# Patient Record
Sex: MALE | Race: WHITE | HISPANIC OR LATINO | URBAN - METROPOLITAN AREA
[De-identification: names, ages, dates, MRNs, and addresses within clinical notes are randomized per-mention and may not be internally consistent; named-entity substitution may affect disease eponyms.]

---

## 2024-07-09 ENCOUNTER — INPATIENT (INPATIENT)
Facility: HOSPITAL | Age: 89
LOS: 0 days | Discharge: NURSING, REHAB OR LONG-TERM CARE | DRG: 185 | End: 2024-07-10
Attending: INTERNAL MEDICINE | Admitting: STUDENT IN AN ORGANIZED HEALTH CARE EDUCATION/TRAINING PROGRAM
Payer: MEDICARE

## 2024-07-09 VITALS
HEART RATE: 61 BPM | TEMPERATURE: 98 F | OXYGEN SATURATION: 98 % | RESPIRATION RATE: 18 BRPM | DIASTOLIC BLOOD PRESSURE: 84 MMHG | SYSTOLIC BLOOD PRESSURE: 161 MMHG

## 2024-07-09 DIAGNOSIS — S22.39XA FRACTURE OF ONE RIB, UNSPECIFIED SIDE, INITIAL ENCOUNTER FOR CLOSED FRACTURE: ICD-10-CM

## 2024-07-09 LAB
ALBUMIN SERPL ELPH-MCNC: 4.2 G/DL — SIGNIFICANT CHANGE UP (ref 3.5–5.2)
ALP SERPL-CCNC: 110 U/L — SIGNIFICANT CHANGE UP (ref 30–115)
ALT FLD-CCNC: 16 U/L — SIGNIFICANT CHANGE UP (ref 0–41)
ANION GAP SERPL CALC-SCNC: 8 MMOL/L — SIGNIFICANT CHANGE UP (ref 7–14)
APPEARANCE UR: CLEAR — SIGNIFICANT CHANGE UP
AST SERPL-CCNC: 24 U/L — SIGNIFICANT CHANGE UP (ref 0–41)
BACTERIA # UR AUTO: NEGATIVE /HPF — SIGNIFICANT CHANGE UP
BASOPHILS # BLD AUTO: 0.03 K/UL — SIGNIFICANT CHANGE UP (ref 0–0.2)
BASOPHILS NFR BLD AUTO: 0.4 % — SIGNIFICANT CHANGE UP (ref 0–1)
BILIRUB SERPL-MCNC: 0.6 MG/DL — SIGNIFICANT CHANGE UP (ref 0.2–1.2)
BILIRUB UR-MCNC: NEGATIVE — SIGNIFICANT CHANGE UP
BUN SERPL-MCNC: 15 MG/DL — SIGNIFICANT CHANGE UP (ref 10–20)
CALCIUM SERPL-MCNC: 9.2 MG/DL — SIGNIFICANT CHANGE UP (ref 8.4–10.5)
CAST: 1 /LPF — SIGNIFICANT CHANGE UP (ref 0–4)
CHLORIDE SERPL-SCNC: 104 MMOL/L — SIGNIFICANT CHANGE UP (ref 98–110)
CO2 SERPL-SCNC: 27 MMOL/L — SIGNIFICANT CHANGE UP (ref 17–32)
COLOR SPEC: YELLOW — SIGNIFICANT CHANGE UP
CREAT SERPL-MCNC: 1.1 MG/DL — SIGNIFICANT CHANGE UP (ref 0.7–1.5)
DIFF PNL FLD: ABNORMAL
EGFR: 64 ML/MIN/1.73M2 — SIGNIFICANT CHANGE UP
EOSINOPHIL # BLD AUTO: 0.1 K/UL — SIGNIFICANT CHANGE UP (ref 0–0.7)
EOSINOPHIL NFR BLD AUTO: 1.3 % — SIGNIFICANT CHANGE UP (ref 0–8)
GLUCOSE SERPL-MCNC: 84 MG/DL — SIGNIFICANT CHANGE UP (ref 70–99)
GLUCOSE UR QL: NEGATIVE MG/DL — SIGNIFICANT CHANGE UP
HCT VFR BLD CALC: 40.7 % — LOW (ref 42–52)
HGB BLD-MCNC: 13.5 G/DL — LOW (ref 14–18)
IMM GRANULOCYTES NFR BLD AUTO: 0.4 % — HIGH (ref 0.1–0.3)
KETONES UR-MCNC: NEGATIVE MG/DL — SIGNIFICANT CHANGE UP
LACTATE SERPL-SCNC: 0.8 MMOL/L — SIGNIFICANT CHANGE UP (ref 0.7–2)
LEUKOCYTE ESTERASE UR-ACNC: NEGATIVE — SIGNIFICANT CHANGE UP
LIDOCAIN IGE QN: 27 U/L — SIGNIFICANT CHANGE UP (ref 7–60)
LYMPHOCYTES # BLD AUTO: 1.7 K/UL — SIGNIFICANT CHANGE UP (ref 1.2–3.4)
LYMPHOCYTES # BLD AUTO: 21.6 % — SIGNIFICANT CHANGE UP (ref 20.5–51.1)
MCHC RBC-ENTMCNC: 31.2 PG — HIGH (ref 27–31)
MCHC RBC-ENTMCNC: 33.2 G/DL — SIGNIFICANT CHANGE UP (ref 32–37)
MCV RBC AUTO: 94 FL — SIGNIFICANT CHANGE UP (ref 80–94)
MONOCYTES # BLD AUTO: 0.64 K/UL — HIGH (ref 0.1–0.6)
MONOCYTES NFR BLD AUTO: 8.1 % — SIGNIFICANT CHANGE UP (ref 1.7–9.3)
NEUTROPHILS # BLD AUTO: 5.37 K/UL — SIGNIFICANT CHANGE UP (ref 1.4–6.5)
NEUTROPHILS NFR BLD AUTO: 68.2 % — SIGNIFICANT CHANGE UP (ref 42.2–75.2)
NITRITE UR-MCNC: NEGATIVE — SIGNIFICANT CHANGE UP
NRBC # BLD: 0 /100 WBCS — SIGNIFICANT CHANGE UP (ref 0–0)
PH UR: 6 — SIGNIFICANT CHANGE UP (ref 5–8)
PLATELET # BLD AUTO: 209 K/UL — SIGNIFICANT CHANGE UP (ref 130–400)
PMV BLD: 9.3 FL — SIGNIFICANT CHANGE UP (ref 7.4–10.4)
POTASSIUM SERPL-MCNC: 4.6 MMOL/L — SIGNIFICANT CHANGE UP (ref 3.5–5)
POTASSIUM SERPL-SCNC: 4.6 MMOL/L — SIGNIFICANT CHANGE UP (ref 3.5–5)
PROT SERPL-MCNC: 7.1 G/DL — SIGNIFICANT CHANGE UP (ref 6–8)
PROT UR-MCNC: SIGNIFICANT CHANGE UP MG/DL
RBC # BLD: 4.33 M/UL — LOW (ref 4.7–6.1)
RBC # FLD: 13.1 % — SIGNIFICANT CHANGE UP (ref 11.5–14.5)
RBC CASTS # UR COMP ASSIST: 12 /HPF — HIGH (ref 0–4)
SODIUM SERPL-SCNC: 139 MMOL/L — SIGNIFICANT CHANGE UP (ref 135–146)
SP GR SPEC: >1.03 — HIGH (ref 1–1.03)
SQUAMOUS # UR AUTO: 1 /HPF — SIGNIFICANT CHANGE UP (ref 0–5)
UROBILINOGEN FLD QL: 1 MG/DL — SIGNIFICANT CHANGE UP (ref 0.2–1)
WBC # BLD: 7.87 K/UL — SIGNIFICANT CHANGE UP (ref 4.8–10.8)
WBC # FLD AUTO: 7.87 K/UL — SIGNIFICANT CHANGE UP (ref 4.8–10.8)
WBC UR QL: 1 /HPF — SIGNIFICANT CHANGE UP (ref 0–5)

## 2024-07-09 PROCEDURE — 71045 X-RAY EXAM CHEST 1 VIEW: CPT | Mod: 26

## 2024-07-09 PROCEDURE — 80053 COMPREHEN METABOLIC PANEL: CPT

## 2024-07-09 PROCEDURE — 36415 COLL VENOUS BLD VENIPUNCTURE: CPT

## 2024-07-09 PROCEDURE — 99285 EMERGENCY DEPT VISIT HI MDM: CPT

## 2024-07-09 PROCEDURE — 83735 ASSAY OF MAGNESIUM: CPT

## 2024-07-09 PROCEDURE — 85025 COMPLETE CBC W/AUTO DIFF WBC: CPT

## 2024-07-09 PROCEDURE — 84100 ASSAY OF PHOSPHORUS: CPT

## 2024-07-09 PROCEDURE — 97162 PT EVAL MOD COMPLEX 30 MIN: CPT | Mod: GP

## 2024-07-09 PROCEDURE — 70450 CT HEAD/BRAIN W/O DYE: CPT | Mod: 26,MC

## 2024-07-09 PROCEDURE — 74177 CT ABD & PELVIS W/CONTRAST: CPT | Mod: 26,MC

## 2024-07-09 RX ORDER — MORPHINE SULFATE 100 MG/1
2 TABLET, EXTENDED RELEASE ORAL EVERY 8 HOURS
Refills: 0 | Status: DISCONTINUED | OUTPATIENT
Start: 2024-07-09 | End: 2024-07-09

## 2024-07-09 RX ORDER — ONDANSETRON HYDROCHLORIDE 2 MG/ML
4 INJECTION INTRAMUSCULAR; INTRAVENOUS ONCE
Refills: 0 | Status: COMPLETED | OUTPATIENT
Start: 2024-07-09 | End: 2024-07-09

## 2024-07-09 RX ORDER — MEMANTINE HYDROCHLORIDE 7 MG/1
10 CAPSULE, EXTENDED RELEASE ORAL
Refills: 0 | Status: DISCONTINUED | OUTPATIENT
Start: 2024-07-09 | End: 2024-07-10

## 2024-07-09 RX ORDER — LISINOPRIL 5 MG/1
10 TABLET ORAL DAILY
Refills: 0 | Status: DISCONTINUED | OUTPATIENT
Start: 2024-07-09 | End: 2024-07-10

## 2024-07-09 RX ORDER — DORZOLAMIDE HCL 2 %
1 DROPS OPHTHALMIC (EYE) THREE TIMES A DAY
Refills: 0 | Status: DISCONTINUED | OUTPATIENT
Start: 2024-07-09 | End: 2024-07-10

## 2024-07-09 RX ORDER — ENOXAPARIN SODIUM 100 MG/ML
40 INJECTION SUBCUTANEOUS EVERY 24 HOURS
Refills: 0 | Status: DISCONTINUED | OUTPATIENT
Start: 2024-07-09 | End: 2024-07-10

## 2024-07-09 RX ORDER — KETOROLAC TROMETHAMINE 30 MG/ML
15 INJECTION, SOLUTION INTRAMUSCULAR ONCE
Refills: 0 | Status: DISCONTINUED | OUTPATIENT
Start: 2024-07-09 | End: 2024-07-09

## 2024-07-09 RX ORDER — LATANOPROST PF 0.05 MG/ML
1 SOLUTION/ DROPS OPHTHALMIC AT BEDTIME
Refills: 0 | Status: DISCONTINUED | OUTPATIENT
Start: 2024-07-09 | End: 2024-07-10

## 2024-07-09 RX ORDER — ATORVASTATIN CALCIUM 20 MG/1
40 TABLET, FILM COATED ORAL AT BEDTIME
Refills: 0 | Status: DISCONTINUED | OUTPATIENT
Start: 2024-07-09 | End: 2024-07-10

## 2024-07-09 RX ORDER — MORPHINE SULFATE 100 MG/1
2 TABLET, EXTENDED RELEASE ORAL EVERY 8 HOURS
Refills: 0 | Status: DISCONTINUED | OUTPATIENT
Start: 2024-07-09 | End: 2024-07-10

## 2024-07-09 RX ORDER — LISINOPRIL 5 MG/1
1 TABLET ORAL
Refills: 0 | DISCHARGE

## 2024-07-09 RX ORDER — ATORVASTATIN CALCIUM 20 MG/1
1 TABLET, FILM COATED ORAL
Refills: 0 | DISCHARGE

## 2024-07-09 RX ORDER — MORPHINE SULFATE 100 MG/1
2 TABLET, EXTENDED RELEASE ORAL ONCE
Refills: 0 | Status: DISCONTINUED | OUTPATIENT
Start: 2024-07-09 | End: 2024-07-09

## 2024-07-09 RX ORDER — ESCITALOPRAM OXALATE 20 MG/1
10 TABLET, FILM COATED ORAL DAILY
Refills: 0 | Status: DISCONTINUED | OUTPATIENT
Start: 2024-07-09 | End: 2024-07-10

## 2024-07-09 RX ORDER — ESCITALOPRAM OXALATE 20 MG/1
1 TABLET, FILM COATED ORAL
Refills: 0 | DISCHARGE

## 2024-07-09 RX ORDER — DONEPEZIL HYDROCHLORIDE 10 MG/1
23 TABLET, FILM COATED ORAL AT BEDTIME
Refills: 0 | Status: DISCONTINUED | OUTPATIENT
Start: 2024-07-09 | End: 2024-07-09

## 2024-07-09 RX ORDER — BRINZOLAMIDE/BRIMONIDINE TARTRATE 10; 2 MG/ML; MG/ML
1 SUSPENSION/ DROPS OPHTHALMIC
Refills: 0 | DISCHARGE

## 2024-07-09 RX ORDER — DEXTROSE MONOHYDRATE AND SODIUM CHLORIDE 5; .3 G/100ML; G/100ML
1000 INJECTION, SOLUTION INTRAVENOUS ONCE
Refills: 0 | Status: COMPLETED | OUTPATIENT
Start: 2024-07-09 | End: 2024-07-09

## 2024-07-09 RX ORDER — MEMANTINE HYDROCHLORIDE 7 MG/1
1 CAPSULE, EXTENDED RELEASE ORAL
Refills: 0 | DISCHARGE

## 2024-07-09 RX ORDER — OXYCODONE AND ACETAMINOPHEN 5; 325 MG/1; MG/1
1 TABLET ORAL EVERY 6 HOURS
Refills: 0 | Status: DISCONTINUED | OUTPATIENT
Start: 2024-07-09 | End: 2024-07-10

## 2024-07-09 RX ORDER — LATANOPROST PF 0.05 MG/ML
1 SOLUTION/ DROPS OPHTHALMIC
Refills: 0 | DISCHARGE

## 2024-07-09 RX ORDER — DONEPEZIL HYDROCHLORIDE 10 MG/1
1 TABLET, FILM COATED ORAL
Refills: 0 | DISCHARGE

## 2024-07-09 RX ORDER — DONEPEZIL HYDROCHLORIDE 10 MG/1
10 TABLET, FILM COATED ORAL AT BEDTIME
Refills: 0 | Status: DISCONTINUED | OUTPATIENT
Start: 2024-07-09 | End: 2024-07-10

## 2024-07-09 RX ADMIN — KETOROLAC TROMETHAMINE 15 MILLIGRAM(S): 30 INJECTION, SOLUTION INTRAMUSCULAR at 15:24

## 2024-07-09 RX ADMIN — ONDANSETRON HYDROCHLORIDE 4 MILLIGRAM(S): 2 INJECTION INTRAMUSCULAR; INTRAVENOUS at 15:24

## 2024-07-09 RX ADMIN — MORPHINE SULFATE 2 MILLIGRAM(S): 100 TABLET, EXTENDED RELEASE ORAL at 18:11

## 2024-07-09 RX ADMIN — DEXTROSE MONOHYDRATE AND SODIUM CHLORIDE 1000 MILLILITER(S): 5; .3 INJECTION, SOLUTION INTRAVENOUS at 15:24

## 2024-07-09 NOTE — ED PROVIDER NOTE - CLINICAL SUMMARY MEDICAL DECISION MAKING FREE TEXT BOX
89 year old male with PMH of Alzheimer's Dx, HTN, Dementia, HLD, and CHF presenting for right sided back pain since 1 day. Per [facility], patient was complaining of severe right lower back pain x1 day. No reports of recent falls or infections. Patient is unable to provide additional medical history. Patient is A&OX1 at baseline.  VSS.  CTAP: There are acute fractures of the right posterior 11th and 12th ribs No evidence of renal or ureteral stone. No evidence of obstructive uropathy. Bilateral pleural calcifications are noted. There are thickened interlobular septa and mild subpleural fibrocystic changes at both lung bases compatible with interstitial lung disease.  Toradol, Morphine, ADMIT.

## 2024-07-09 NOTE — ED PROVIDER NOTE - OBJECTIVE STATEMENT
Patient is a 89 year old male with PMH of Alzheimer's Dx, HTN, Dementia, HLD, and CHF presenting for flank pain. Per [facility], patient was complaining of severe right lower flank pain x1 day. No reports of recent falls or infections. Patient is unable to provide additional medical history.

## 2024-07-09 NOTE — H&P ADULT - ATTENDING COMMENTS
Pt seen and examined in ED4 today. History as above and case discussed with son and daughter in law at bedside.  Pt presented from assisted living with right sided back pain x 1 day. He ambulates without assistance. NO h/o fall, trauma.  Pt currently has no complaints. NO fever. Workup showed 2 right rib fractures and he was admitted. He ambulated 75 ft with RW with physical therapy today. Remains on RA and in no distress. Family wants him discharged back to assisted living today if stable.     T(F): 97.5 (07-10-24 @ 07:50), Max: 98.2 (07-09-24 @ 14:00)  HR: 58 (07-10-24 @ 07:50) (57 - 61)  BP: 138/64 (07-10-24 @ 07:50) (138/64 - 161/84)  RR: 18 (07-10-24 @ 07:50) (18 - 18)  SpO2: 98% (07-10-24 @ 07:50) (96% - 98%)    PHYSICAL EXAM:  GENERAL: NAD  HEAD:  Normocephalic  EYES:  conjunctiva and sclera clear  ENMT: Moist mucous membranes  NERVOUS SYSTEM:  Alert, awake, fair concentration, follows commands, ate breakfast  CHEST/LUNG: CTA b/l; No rales, rhonchi, wheezing  HEART: Regular rate and rhythm  ABDOMEN: Soft, Nontender, Nondistended  EXTREMITIES:   No edema LE  SKIN: warm, dry  no ecchymoses of back noted    Consultant(s) Notes Reviewed:  [x ] YES  [ ] NO  Care Discussed with Consultants/Other Providers [ x] YES  [ ] NO    LABS:                        12.7   6.57  )-----------( 192      ( 10 Jul 2024 06:31 )             38.2     07-10    142  |  104  |  17  ----------------------------<  77  4.8   |  26  |  1.1    Ca    9.4      10 Jul 2024 06:31  Phos  3.3     07-10  Mg     2.0     07-10    TPro  6.0  /  Alb  4.0  /  TBili  0.8  /  DBili  x   /  AST  23  /  ALT  14  /  AlkPhos  115  07-10      RADIOLOGY & ADDITIONAL TESTS:  Imaging or report Personally Reviewed:  [x ] YES  [ ] NO    < from: Xray Chest 1 View-PORTABLE IMMEDIATE (Xray Chest 1 View-PORTABLE IMMEDIATE .) (07.09.24 @ 18:22) >  Impression: Rib fractures better appreciated on CT.  No radiographic evidence of acute cardiopulmonary disease. Calcified pleural plaques.    < from: CT Abdomen and Pelvis w/ IV Cont (07.09.24 @ 15:42) >  IMPRESSION:There are acute fractures of the right posterior 11th and 12th ribs   (3/111; 3/136).  No evidence of renal or ureteral stone. No evidence of obstructive uropathy.  Bilateral pleural calcifications are noted. There are thickened interlobular septa and mild subpleural fibrocystic changes at both lung   bases compatible with interstitial lung disease.    < from: CT Head No Cont (07.09.24 @ 15:39) >  IMPRESSION: No evidence of acute intracranial pathology.    Case discussed with residents and RN on rounds today  Care discussed with pt's family    89 y/o man with PMH of Alzheimer's Dx, HTN, Dementia, hyperlipidemia and CHF (unknown EF) presented for right sided back pain x 1 day and found with acute 11th and 12th Right posterior Rib fractures.    Acute right rib fractures - pt currently comfortable in bed and ambulated 75 ft with RW with physical therapy  pain control with tylenol, lidoderm  fall precautions  home PT  stable for discharge back to assisted living today    Continue current management for HTN, hyperlipidemia and dementia    full code    med rec and discharge papers reviewed by me

## 2024-07-09 NOTE — H&P ADULT - NSHPPHYSICALEXAM_GEN_ALL_CORE
PHYSICAL EXAM:  GENERAL: NAD, well-groomed, well-developed  HEAD:  Atraumatic, Normocephalic  EYES: EOMI, PERRLA, conjunctiva and sclera clear  NECK: Supple, No JVD, Normal thyroid  HEART: Regular rate and rhythm; No murmurs, rubs, or gallops  RESPIRATORY: CTA B/L, No W/R/R,   ABDOMEN: Soft, Nontender, Nondistended; Bowel sounds present  NEUROLOGY: A&Ox1, nonfocal, moving all extremities  EXTREMITIES:  2+ Peripheral Pulses, No clubbing, cyanosis, or edema  SKIN: warm, dry, normal color, no rash or abnormal lesions, no bruises on skin

## 2024-07-09 NOTE — ED PROVIDER NOTE - PHYSICAL EXAMINATION
VITAL SIGNS: I have reviewed nursing notes and confirm.  CONSTITUTIONAL: Non-toxic, in NAD  SKIN: Warm dry, normal skin turgor  HEAD: NCAT  EYES: No conjunctival injection, scleral anicteric  ENT: Moist mucous membranes, normal pharynx with no erythema or exudates  NECK: Supple; full ROM. Nontender. No cervical LAD  CARD: RRR, no murmurs, rubs or gallops  RESP: Clear to ausculation bilaterally.  No rales, rhonchi, or wheezing.  ABD: Soft, non-distended, non-tender  EXT: Full ROM  NEURO: Normal motor, normal sensory. No focal neurological deficits noted  PSYCH: Confused, Agitated

## 2024-07-09 NOTE — H&P ADULT - ASSESSMENT
89 year old male with PMH of Alzheimer's Dx, HTN, Dementia, HLD, and CHF presenting for right sided back pain since 1 day. Patient found to have 11th and 12th R Rib fracture.      #Acute 11/12 R Rib fracture  - HH and family denies any recent trauma or fall  - CTAP: acute fractures of the right posterior 11th and 12th ribs No evidence of renal or ureteral stone. No evidence of obstructive uropathy. Bilateral pleural calcifications are noted. There are thickened interlobular septa and mild subpleural fibrocystic changes at both lung bases compatible with interstitial lung disease.  -  89 year old male with PMH of Alzheimer's Dx, HTN, Dementia, HLD, and CHF presenting for right sided back pain since 1 day. Patient found to have 11th and 12th R Rib fracture.      #Acute 11/12 R Rib fracture  - NH and family denies any recent trauma or fall  - CTAP: acute fractures of the right posterior 11th and 12th ribs No evidence of renal or ureteral stone. No evidence of obstructive uropathy. Bilateral pleural calcifications are noted. There are thickened interlobular septa and mild subpleural fibrocystic changes at both lung bases compatible with interstitial lung disease.  - adequate pain control  - PT consult   - Supportive care    #HTN  #HLD  - c/w Lisinopril 10mg daily  - c/w Atrovastatin 40mg dailt    #Alzheimer disease  #Dementia  - c/w Memantine 10mg bid  - c/w Escitalopram 10mg daily   - c/w donepezil 23mg daily      #MISC  - DVT PPx: lovenox  - GI PPx: not needed  - Diet: DASH/TLC  - Activity: AAT  - Labs: ordered  - Dispo: from NH

## 2024-07-09 NOTE — ED ADULT NURSE NOTE - NSFALLRISKINTERV_ED_ALL_ED
Assistance OOB with selected safe patient handling equipment if applicable/Communicate fall risk and risk factors to all staff, patient, and family/Provide patient with walking aids/Provide visual cue: yellow wristband, yellow gown, etc/Reinforce activity limits and safety measures with patient and family/Call bell, personal items and telephone in reach/Instruct patient to call for assistance before getting out of bed/chair/stretcher/Non-slip footwear applied when patient is off stretcher/[place] to call system/Physically safe environment - no spills, clutter or unnecessary equipment/Purposeful Proactive Rounding/Room/bathroom lighting operational, light cord in reach

## 2024-07-09 NOTE — ED ADULT NURSE NOTE - SUICIDE SCREENING QUESTION 2
Indication: Back pain

 

Technique: 4 views of the lumbar spine

 

Comparison: 8/28/2016

 

Findings: Bony alignment is normal. Vertebral body heights are preserved. Disc spaces

are preserved. 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

No significant interim change

 

Impression: No acute process No

## 2024-07-09 NOTE — H&P ADULT - HISTORY OF PRESENT ILLNESS
89 year old male with PMH of Alzheimer's Dx, HTN, Dementia, HLD, and CHF presenting for right sided back pain since 1 day. Per [facility], patient was complaining of severe right lower back pain x1 day. No reports of recent falls or infections. Patient is unable to provide additional medical history. Patient is A&OX1 at baseline I spoke with daughter in law Mariana on phone, she and her  visit Mr Parisi two time in a week and last met on Sunday when he was fine, she mentioned pt usually does not talk much as has word finding difficulty but he does speak if something brothers him. Patient walks by himself, does not need any assistance does not use cane or walker, pt is able to eat by himself, does not require assistance. Family nor aware if the pt fell recently.   In ED:  Vitals: BP: 161/84  HR: 61  RR: 16  SPO2: 98% on RA  Labs: WNL  CTAP: There are acute fractures of the right posterior 11th and 12th ribs No evidence of renal or ureteral stone. No evidence of obstructive uropathy. Bilateral pleural calcifications are noted. There are thickened interlobular septa and mild subpleural fibrocystic changes at both lung bases compatible with interstitial lung disease.  s/p IVF, Toradol and morphine in ED

## 2024-07-09 NOTE — H&P ADULT - NSHPLABSRESULTS_GEN_ALL_CORE
.  LABS:                         13.5   7.87  )-----------( 209      ( 09 Jul 2024 15:37 )             40.7     07-09    139  |  104  |  15  ----------------------------<  84  4.6   |  27  |  1.1    Ca    9.2      09 Jul 2024 15:37    TPro  7.1  /  Alb  4.2  /  TBili  0.6  /  DBili  x   /  AST  24  /  ALT  16  /  AlkPhos  110  07-09      Urinalysis Basic - ( 09 Jul 2024 16:38 )    Color: Yellow / Appearance: Clear / SG: >1.030 / pH: x  Gluc: x / Ketone: Negative mg/dL  / Bili: Negative / Urobili: 1.0 mg/dL   Blood: x / Protein: Trace mg/dL / Nitrite: Negative   Leuk Esterase: Negative / RBC: 12 /HPF / WBC 1 /HPF   Sq Epi: x / Non Sq Epi: 1 /HPF / Bacteria: Negative /HPF      Lactate, Blood: 0.8 mmol/L (07-09 @ 15:37)      RADIOLOGY, EKG & ADDITIONAL TESTS: Reviewed.

## 2024-07-10 VITALS
DIASTOLIC BLOOD PRESSURE: 74 MMHG | HEART RATE: 75 BPM | RESPIRATION RATE: 18 BRPM | SYSTOLIC BLOOD PRESSURE: 121 MMHG | TEMPERATURE: 98 F | OXYGEN SATURATION: 95 %

## 2024-07-10 LAB
ALBUMIN SERPL ELPH-MCNC: 4 G/DL — SIGNIFICANT CHANGE UP (ref 3.5–5.2)
ALP SERPL-CCNC: 115 U/L — SIGNIFICANT CHANGE UP (ref 30–115)
ALT FLD-CCNC: 14 U/L — SIGNIFICANT CHANGE UP (ref 0–41)
ANION GAP SERPL CALC-SCNC: 12 MMOL/L — SIGNIFICANT CHANGE UP (ref 7–14)
AST SERPL-CCNC: 23 U/L — SIGNIFICANT CHANGE UP (ref 0–41)
BASOPHILS # BLD AUTO: 0.04 K/UL — SIGNIFICANT CHANGE UP (ref 0–0.2)
BASOPHILS NFR BLD AUTO: 0.6 % — SIGNIFICANT CHANGE UP (ref 0–1)
BILIRUB SERPL-MCNC: 0.8 MG/DL — SIGNIFICANT CHANGE UP (ref 0.2–1.2)
BUN SERPL-MCNC: 17 MG/DL — SIGNIFICANT CHANGE UP (ref 10–20)
CALCIUM SERPL-MCNC: 9.4 MG/DL — SIGNIFICANT CHANGE UP (ref 8.4–10.4)
CHLORIDE SERPL-SCNC: 104 MMOL/L — SIGNIFICANT CHANGE UP (ref 98–110)
CO2 SERPL-SCNC: 26 MMOL/L — SIGNIFICANT CHANGE UP (ref 17–32)
CREAT SERPL-MCNC: 1.1 MG/DL — SIGNIFICANT CHANGE UP (ref 0.7–1.5)
CULTURE RESULTS: SIGNIFICANT CHANGE UP
EGFR: 64 ML/MIN/1.73M2 — SIGNIFICANT CHANGE UP
EOSINOPHIL # BLD AUTO: 0.15 K/UL — SIGNIFICANT CHANGE UP (ref 0–0.7)
EOSINOPHIL NFR BLD AUTO: 2.3 % — SIGNIFICANT CHANGE UP (ref 0–8)
GLUCOSE SERPL-MCNC: 77 MG/DL — SIGNIFICANT CHANGE UP (ref 70–99)
HCT VFR BLD CALC: 38.2 % — LOW (ref 42–52)
HGB BLD-MCNC: 12.7 G/DL — LOW (ref 14–18)
IMM GRANULOCYTES NFR BLD AUTO: 0.5 % — HIGH (ref 0.1–0.3)
LYMPHOCYTES # BLD AUTO: 1.42 K/UL — SIGNIFICANT CHANGE UP (ref 1.2–3.4)
LYMPHOCYTES # BLD AUTO: 21.6 % — SIGNIFICANT CHANGE UP (ref 20.5–51.1)
MAGNESIUM SERPL-MCNC: 2 MG/DL — SIGNIFICANT CHANGE UP (ref 1.8–2.4)
MCHC RBC-ENTMCNC: 31.1 PG — HIGH (ref 27–31)
MCHC RBC-ENTMCNC: 33.2 G/DL — SIGNIFICANT CHANGE UP (ref 32–37)
MCV RBC AUTO: 93.4 FL — SIGNIFICANT CHANGE UP (ref 80–94)
MONOCYTES # BLD AUTO: 0.53 K/UL — SIGNIFICANT CHANGE UP (ref 0.1–0.6)
MONOCYTES NFR BLD AUTO: 8.1 % — SIGNIFICANT CHANGE UP (ref 1.7–9.3)
NEUTROPHILS # BLD AUTO: 4.4 K/UL — SIGNIFICANT CHANGE UP (ref 1.4–6.5)
NEUTROPHILS NFR BLD AUTO: 66.9 % — SIGNIFICANT CHANGE UP (ref 42.2–75.2)
NRBC # BLD: 0 /100 WBCS — SIGNIFICANT CHANGE UP (ref 0–0)
PHOSPHATE SERPL-MCNC: 3.3 MG/DL — SIGNIFICANT CHANGE UP (ref 2.1–4.9)
PLATELET # BLD AUTO: 192 K/UL — SIGNIFICANT CHANGE UP (ref 130–400)
PMV BLD: 9.7 FL — SIGNIFICANT CHANGE UP (ref 7.4–10.4)
POTASSIUM SERPL-MCNC: 4.8 MMOL/L — SIGNIFICANT CHANGE UP (ref 3.5–5)
POTASSIUM SERPL-SCNC: 4.8 MMOL/L — SIGNIFICANT CHANGE UP (ref 3.5–5)
PROT SERPL-MCNC: 6 G/DL — SIGNIFICANT CHANGE UP (ref 6–8)
RBC # BLD: 4.09 M/UL — LOW (ref 4.7–6.1)
RBC # FLD: 13 % — SIGNIFICANT CHANGE UP (ref 11.5–14.5)
SODIUM SERPL-SCNC: 142 MMOL/L — SIGNIFICANT CHANGE UP (ref 135–146)
SPECIMEN SOURCE: SIGNIFICANT CHANGE UP
WBC # BLD: 6.57 K/UL — SIGNIFICANT CHANGE UP (ref 4.8–10.8)
WBC # FLD AUTO: 6.57 K/UL — SIGNIFICANT CHANGE UP (ref 4.8–10.8)

## 2024-07-10 PROCEDURE — 99222 1ST HOSP IP/OBS MODERATE 55: CPT | Mod: AI

## 2024-07-10 RX ORDER — LIDOCAINE HCL 28 MG/G
1 GEL TOPICAL
Qty: 0 | Refills: 0 | DISCHARGE
Start: 2024-07-10

## 2024-07-10 RX ORDER — LIDOCAINE HCL 28 MG/G
1 GEL TOPICAL
Qty: 5 | Refills: 0
Start: 2024-07-10 | End: 2024-07-24

## 2024-07-10 RX ORDER — LIDOCAINE HCL 28 MG/G
1 GEL TOPICAL
Qty: 15 | Refills: 0
Start: 2024-07-10 | End: 2024-07-24

## 2024-07-10 RX ORDER — LIDOCAINE HCL 28 MG/G
1 GEL TOPICAL DAILY
Refills: 0 | Status: DISCONTINUED | OUTPATIENT
Start: 2024-07-10 | End: 2024-07-10

## 2024-07-10 RX ORDER — ACETAMINOPHEN 325 MG
2 TABLET ORAL
Qty: 80 | Refills: 0
Start: 2024-07-10 | End: 2024-07-19

## 2024-07-10 RX ORDER — ACETAMINOPHEN 325 MG
650 TABLET ORAL EVERY 6 HOURS
Refills: 0 | Status: DISCONTINUED | OUTPATIENT
Start: 2024-07-10 | End: 2024-07-10

## 2024-07-10 RX ADMIN — LIDOCAINE HCL 1 PATCH: 28 GEL TOPICAL at 11:47

## 2024-07-10 RX ADMIN — Medication 650 MILLIGRAM(S): at 11:47

## 2024-07-10 RX ADMIN — Medication 1 TABLET(S): at 11:47

## 2024-07-10 RX ADMIN — MEMANTINE HYDROCHLORIDE 10 MILLIGRAM(S): 7 CAPSULE, EXTENDED RELEASE ORAL at 05:39

## 2024-07-10 RX ADMIN — ESCITALOPRAM OXALATE 10 MILLIGRAM(S): 20 TABLET, FILM COATED ORAL at 11:47

## 2024-07-10 RX ADMIN — LISINOPRIL 10 MILLIGRAM(S): 5 TABLET ORAL at 05:38

## 2024-07-10 RX ADMIN — Medication 1 DROP(S): at 05:43

## 2024-07-10 NOTE — DISCHARGE NOTE PROVIDER - CARE PROVIDERS DIRECT ADDRESSES
[email or web address]

## 2024-07-10 NOTE — PHYSICAL THERAPY INITIAL EVALUATION ADULT - GAIT DEVIATIONS NOTED, PT EVAL
NBOS, vc's to increase the NADINE/decreased step length/decreased stride length/decreased weight-shifting ability

## 2024-07-10 NOTE — DISCHARGE NOTE PROVIDER - NSDCCPCAREPLAN_GEN_ALL_CORE_FT
PRINCIPAL DISCHARGE DIAGNOSIS  Diagnosis: Rib fracture  Assessment and Plan of Treatment: You came to the hospital for right sided back pain, you were found to have right sided posterior rib fractures (11th and 12th rib). You had no recent trauma or fall. Your labs were normal. Please take tylenol or place lidocaine patches as needed for pain. Contiue doing physical therapy 3-5 times a week

## 2024-07-10 NOTE — DISCHARGE NOTE PROVIDER - NSDCMRMEDTOKEN_GEN_ALL_CORE_FT
acetaminophen 325 mg oral tablet: 2 tab(s) orally every 6 hours as needed for  moderate pain  atorvastatin 40 mg oral tablet: 1 tab(s) orally once a day (at bedtime)  donepezil 23 mg oral tablet: 1 tab(s) orally once a day  escitalopram 10 mg oral tablet: 1 tab(s) orally once a day  latanoprost 0.005% ophthalmic solution: 1 drop(s) in each eye once a day 1 drop in both eyes  lidocaine 4% topical film: Apply topically to affected area once a day as needed for  moderate pain Place on right posterior ribs where pain is, AS needed for pain, daily  lisinopril 10 mg oral tablet: 1 tab(s) orally once a day  memantine 10 mg oral tablet: 1 tab(s) orally 2 times a day  Multiple Vitamins oral tablet: 1 tab(s) orally once a day  Simbrinza 1%- 0.2% ophthalmic suspension: 1 drop(s) in each affected eye once a day one drop in left eye   acetaminophen 325 mg oral tablet: 2 tab(s) orally every 6 hours as needed for  moderate pain  atorvastatin 40 mg oral tablet: 1 tab(s) orally once a day (at bedtime)  donepezil 23 mg oral tablet: 1 tab(s) orally once a day  escitalopram 10 mg oral tablet: 1 tab(s) orally once a day  latanoprost 0.005% ophthalmic solution: 1 drop(s) in each eye once a day 1 drop in both eyes  lidocaine 4% topical film: Apply topically to affected area once a day as needed for  moderate pain  lisinopril 10 mg oral tablet: 1 tab(s) orally once a day  memantine 10 mg oral tablet: 1 tab(s) orally 2 times a day  Multiple Vitamins oral tablet: 1 tab(s) orally once a day  Simbrinza 1%- 0.2% ophthalmic suspension: 1 drop(s) in each affected eye once a day one drop in left eye

## 2024-07-10 NOTE — DISCHARGE NOTE NURSING/CASE MANAGEMENT/SOCIAL WORK - PATIENT PORTAL LINK FT
You can access the FollowMyHealth Patient Portal offered by [facility] by registering at the following website: [email or web address]. By joining [employer]’s FollowMyHealth portal, you will also be able to view your health information using other applications (apps) compatible with our system.

## 2024-07-10 NOTE — PHYSICAL THERAPY INITIAL EVALUATION ADULT - ADDITIONAL COMMENTS
Pt is from [facility]. unable to obtain accurate info from the pt 2* impaired cognition

## 2024-07-10 NOTE — DISCHARGE NOTE PROVIDER - HOSPITAL COURSE
89 year old male with PMH of Alzheimer's Dx, HTN, Dementia, HLD, and CHF presenting for right sided back pain since 1 day. Per [facility], patient was complaining of severe right lower back pain x1 day. No reports of recent falls or infections. Patient is unable to provide additional medical history. Patient is A&OX1 at baseline I spoke with daughter in law Mariana on phone, she and her  visit Mr Parisi two time in a week and last met on Sunday when he was fine, she mentioned pt usually does not talk much as has word finding difficulty but he does speak if something brothers him. Patient walks by himself, does not need any assistance does not use cane or walker, pt is able to eat by himself, does not require assistance. Family nor aware if the pt fell recently.     Work-up done:   #Acute 11/12 R Rib fracture  - NH and family denies any recent trauma or fall  - CTAP: acute fractures of the right posterior 11th and 12th ribs No evidence of renal or ureteral stone. No evidence of obstructive uropathy. Bilateral pleural calcifications are noted. There are thickened interlobular septa and mild subpleural fibrocystic changes at both lung bases compatible with interstitial lung disease.  - s/p IVF, Toradol and morphine in ED  - Labs within normal limits  - Pain under control  - Seen by PT 7/10 who recommended PT 3-5x/w (home PT vs rehab)  - Supportive care    #HTN  #HLD  - Continued home Lisinopril 10mg daily  - Continued home Atrovastatin 40mg dailt    #Alzheimer disease  #Dementia  - Continued home Memantine 10mg bid  - Continued home Escitalopram 10mg daily   - Continued home donepezil 23mg daily 89 year old male with PMH of Alzheimer's Dx, HTN, Dementia, HLD, and CHF presenting for right sided back pain since 1 day. Per [facility], patient was complaining of severe right lower back pain x1 day. No reports of recent falls or infections. Patient is unable to provide additional medical history. Patient is A&OX1 at baseline I spoke with daughter in law Mariana on phone, she and her  visit Mr Parisi two time in a week and last met on Sunday when he was fine, she mentioned pt usually does not talk much as has word finding difficulty but he does speak if something brothers him. Patient walks by himself, does not need any assistance does not use cane or walker, pt is able to eat by himself, does not require assistance. Family nor aware if the pt fell recently.     Work-up done:    #Acute 11/12 R Rib fracture  - NH and family denies any recent trauma or fall  - CTAP: acute fractures of the right posterior 11th and 12th ribs No evidence of renal or ureteral stone. No evidence of obstructive uropathy. Bilateral pleural calcifications are noted. There are thickened interlobular septa and mild subpleural fibrocystic changes at both lung bases compatible with interstitial lung disease.  - s/p IVF, Toradol and morphine in ED  - Labs within normal limits  - Pain under control  - Seen by PT 7/10 who recommended PT 3-5x/w (home PT vs rehab)  - Supportive care    #HTN  #HLD  - Continued home Lisinopril 10mg daily  - Continued home Atrovastatin 40mg dailt    #Alzheimer disease  #Dementia  - Continued home Memantine 10mg bid  - Continued home Escitalopram 10mg daily   - Continued home donepezil 23mg daily 89 year old male with PMH of Alzheimer's Dx, HTN, Dementia, HLD, and CHF presenting for right sided back pain since 1 day. Per [facility], patient was complaining of severe right lower back pain x1 day. No reports of recent falls or infections. Patient is unable to provide additional medical history. Patient is A&OX1 at baseline I spoke with daughter in law Mariana on phone, she and her  visit Mr Parisi two time in a week and last met on Sunday when he was fine, she mentioned pt usually does not talk much as has word finding difficulty but he does speak if something brothers him. Patient walks by himself, does not need any assistance does not use cane or walker, pt is able to eat by himself, does not require assistance. Family nor aware if the pt fell recently.     Work-up done:    #Acute 11/12 R Rib fracture  - NH and family denies any recent trauma or fall  - CTAP: acute fractures of the right posterior 11th and 12th ribs No evidence of renal or ureteral stone. No evidence of obstructive uropathy. Bilateral pleural calcifications are noted. There are thickened interlobular septa and mild subpleural fibrocystic changes at both lung bases compatible with interstitial lung disease.  - s/p IVF, Toradol and morphine in ED  - Labs within normal limits  - Pain under control  - Seen by PT 7/10 who recommended PT 3-5x/w (home PT vs rehab)  - Supportive care    #HTN  #HLD  - Continued home Lisinopril 10mg daily  - Continued home Atrovastatin 40mg daily    #Alzheimer disease  #Dementia  - Continued home Memantine 10mg bid  - Continued home Escitalopram 10mg daily   - Continued home donepezil 23mg daily

## 2024-07-10 NOTE — DISCHARGE NOTE PROVIDER - CARE PROVIDER_API CALL
Juan Carreno  Internal Medicine  [address] [facility] [address]  [place] [address]  Phone: [phone]  Fax: [phone]  Follow Up Time: 2 weeks

## 2024-07-10 NOTE — PHYSICAL THERAPY INITIAL EVALUATION ADULT - PERTINENT HX OF CURRENT PROBLEM, REHAB EVAL
89 year old male with PMH of Alzheimer's Dx, HTN, Dementia, HLD, and CHF presenting for right sided back pain since 1 day. Patient found to have 11th and 12th R Rib fracture.  Acute 11/12 R Rib fracture  - NH and family denies any recent trauma or fall  - CTAP: acute fractures of the right posterior 11th and 12th ribs No evidence of renal or ureteral stone. No evidence of obstructive uropathy. Bilateral pleural calcifications are noted. There are thickened interlobular septa and mild subpleural fibrocystic changes at both lung bases compatible with interstitial lung disease.

## 2024-07-10 NOTE — DISCHARGE NOTE NURSING/CASE MANAGEMENT/SOCIAL WORK - NSDCPEFALRISK_GEN_ALL_CORE
For information on Fall & Injury Prevention, visit: [email or web address] OR  [email or web address] OR  https://www.cdc.gov/steadi/patient.html

## 2024-07-23 DIAGNOSIS — S22.41XA MULTIPLE FRACTURES OF RIBS, RIGHT SIDE, INITIAL ENCOUNTER FOR CLOSED FRACTURE: ICD-10-CM

## 2024-07-23 DIAGNOSIS — E78.5 HYPERLIPIDEMIA, UNSPECIFIED: ICD-10-CM

## 2024-07-23 DIAGNOSIS — I11.0 HYPERTENSIVE HEART DISEASE WITH HEART FAILURE: ICD-10-CM

## 2024-07-23 DIAGNOSIS — F02.80 DEMENTIA IN OTHER DISEASES CLASSIFIED ELSEWHERE, UNSPECIFIED SEVERITY, WITHOUT BEHAVIORAL DISTURBANCE, PSYCHOTIC DISTURBANCE, MOOD DISTURBANCE, AND ANXIETY: ICD-10-CM

## 2024-07-23 DIAGNOSIS — I50.9 HEART FAILURE, UNSPECIFIED: ICD-10-CM

## 2024-07-23 DIAGNOSIS — G30.9 ALZHEIMER'S DISEASE, UNSPECIFIED: ICD-10-CM
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
## 2025-03-12 ENCOUNTER — EMERGENCY (EMERGENCY)
Facility: HOSPITAL | Age: 89
LOS: 0 days | Discharge: HOME | End: 2025-03-13
Attending: STUDENT IN AN ORGANIZED HEALTH CARE EDUCATION/TRAINING PROGRAM
Payer: MEDICARE

## 2025-03-12 VITALS
SYSTOLIC BLOOD PRESSURE: 161 MMHG | WEIGHT: 164.02 LBS | TEMPERATURE: 98 F | OXYGEN SATURATION: 98 % | HEART RATE: 57 BPM | RESPIRATION RATE: 17 BRPM | DIASTOLIC BLOOD PRESSURE: 71 MMHG

## 2025-03-12 DIAGNOSIS — S09.90XA UNSPECIFIED INJURY OF HEAD, INITIAL ENCOUNTER: ICD-10-CM

## 2025-03-12 DIAGNOSIS — I11.0 HYPERTENSIVE HEART DISEASE WITH HEART FAILURE: ICD-10-CM

## 2025-03-12 DIAGNOSIS — I50.9 HEART FAILURE, UNSPECIFIED: ICD-10-CM

## 2025-03-12 DIAGNOSIS — E78.5 HYPERLIPIDEMIA, UNSPECIFIED: ICD-10-CM

## 2025-03-12 DIAGNOSIS — S32.039A UNSPECIFIED FRACTURE OF THIRD LUMBAR VERTEBRA, INITIAL ENCOUNTER FOR CLOSED FRACTURE: ICD-10-CM
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
## 2025-03-12 PROCEDURE — 99285 EMERGENCY DEPT VISIT HI MDM: CPT

## 2025-03-12 NOTE — ED ADULT TRIAGE NOTE - CHIEF COMPLAINT QUOTE
Unwitnessed fall from [facility] assisted [facility], he was bleeding from the head, no anticoagulants,  full code, Alzheimer's, you are not getting much out of him - EMS

## 2025-03-13 ENCOUNTER — INPATIENT (INPATIENT)
Facility: HOSPITAL | Age: 89
LOS: 3 days | Discharge: NURSING, REHAB OR LONG-TERM CARE | DRG: 948 | End: 2025-03-17
Attending: INTERNAL MEDICINE | Admitting: STUDENT IN AN ORGANIZED HEALTH CARE EDUCATION/TRAINING PROGRAM
Payer: MEDICARE

## 2025-03-13 VITALS
OXYGEN SATURATION: 98 % | TEMPERATURE: 96 F | SYSTOLIC BLOOD PRESSURE: 150 MMHG | WEIGHT: 149.91 LBS | RESPIRATION RATE: 20 BRPM | DIASTOLIC BLOOD PRESSURE: 89 MMHG | HEART RATE: 67 BPM

## 2025-03-13 DIAGNOSIS — R53.1 WEAKNESS: ICD-10-CM

## 2025-03-13 PROBLEM — I50.9 HEART FAILURE, UNSPECIFIED: Chronic | Status: ACTIVE | Noted: 2024-07-09

## 2025-03-13 PROBLEM — G30.9 ALZHEIMER'S DISEASE, UNSPECIFIED: Chronic | Status: ACTIVE | Noted: 2024-07-09

## 2025-03-13 PROBLEM — I10 ESSENTIAL (PRIMARY) HYPERTENSION: Chronic | Status: ACTIVE | Noted: 2024-07-09

## 2025-03-13 PROBLEM — F03.90 UNSPECIFIED DEMENTIA, UNSPECIFIED SEVERITY, WITHOUT BEHAVIORAL DISTURBANCE, PSYCHOTIC DISTURBANCE, MOOD DISTURBANCE, AND ANXIETY: Chronic | Status: ACTIVE | Noted: 2024-07-09

## 2025-03-13 LAB
ALBUMIN SERPL ELPH-MCNC: 4.3 G/DL — SIGNIFICANT CHANGE UP (ref 3.5–5.2)
ALBUMIN SERPL ELPH-MCNC: 4.5 G/DL — SIGNIFICANT CHANGE UP (ref 3.5–5.2)
ALP SERPL-CCNC: 149 U/L — HIGH (ref 30–115)
ALT FLD-CCNC: 19 U/L — SIGNIFICANT CHANGE UP (ref 0–41)
ALT FLD-CCNC: 23 U/L — SIGNIFICANT CHANGE UP (ref 0–41)
ANION GAP SERPL CALC-SCNC: 10 MMOL/L — SIGNIFICANT CHANGE UP (ref 7–14)
ANION GAP SERPL CALC-SCNC: 11 MMOL/L — SIGNIFICANT CHANGE UP (ref 7–14)
APAP SERPL-MCNC: <5 UG/ML — LOW (ref 10–30)
APTT BLD: 31.1 SEC — SIGNIFICANT CHANGE UP (ref 27–39.2)
AST SERPL-CCNC: 28 U/L — SIGNIFICANT CHANGE UP (ref 0–41)
AST SERPL-CCNC: 31 U/L — SIGNIFICANT CHANGE UP (ref 0–41)
BASOPHILS # BLD AUTO: 0.02 K/UL — SIGNIFICANT CHANGE UP (ref 0–0.2)
BASOPHILS # BLD AUTO: 0.04 K/UL — SIGNIFICANT CHANGE UP (ref 0–0.2)
BASOPHILS NFR BLD AUTO: 0.3 % — SIGNIFICANT CHANGE UP (ref 0–1)
BASOPHILS NFR BLD AUTO: 0.4 % — SIGNIFICANT CHANGE UP (ref 0–1)
BILIRUB SERPL-MCNC: 0.4 MG/DL — SIGNIFICANT CHANGE UP (ref 0.2–1.2)
BILIRUB SERPL-MCNC: 0.7 MG/DL — SIGNIFICANT CHANGE UP (ref 0.2–1.2)
BUN SERPL-MCNC: 15 MG/DL — SIGNIFICANT CHANGE UP (ref 10–20)
BUN SERPL-MCNC: 20 MG/DL — SIGNIFICANT CHANGE UP (ref 10–20)
CALCIUM SERPL-MCNC: 9.5 MG/DL — SIGNIFICANT CHANGE UP (ref 8.4–10.5)
CALCIUM SERPL-MCNC: 9.5 MG/DL — SIGNIFICANT CHANGE UP (ref 8.4–10.5)
CHLORIDE SERPL-SCNC: 104 MMOL/L — SIGNIFICANT CHANGE UP (ref 98–110)
CHLORIDE SERPL-SCNC: 105 MMOL/L — SIGNIFICANT CHANGE UP (ref 98–110)
CO2 SERPL-SCNC: 27 MMOL/L — SIGNIFICANT CHANGE UP (ref 17–32)
CO2 SERPL-SCNC: 27 MMOL/L — SIGNIFICANT CHANGE UP (ref 17–32)
CREAT SERPL-MCNC: 1.1 MG/DL — SIGNIFICANT CHANGE UP (ref 0.7–1.5)
CREAT SERPL-MCNC: 1.2 MG/DL — SIGNIFICANT CHANGE UP (ref 0.7–1.5)
EGFR: 57 ML/MIN/1.73M2 — LOW
EGFR: 63 ML/MIN/1.73M2 — SIGNIFICANT CHANGE UP
EGFR: 63 ML/MIN/1.73M2 — SIGNIFICANT CHANGE UP
EOSINOPHIL # BLD AUTO: 0.07 K/UL — SIGNIFICANT CHANGE UP (ref 0–0.7)
EOSINOPHIL # BLD AUTO: 0.15 K/UL — SIGNIFICANT CHANGE UP (ref 0–0.7)
EOSINOPHIL NFR BLD AUTO: 1.1 % — SIGNIFICANT CHANGE UP (ref 0–8)
EOSINOPHIL NFR BLD AUTO: 1.6 % — SIGNIFICANT CHANGE UP (ref 0–8)
ETHANOL SERPL-MCNC: <10 MG/DL — SIGNIFICANT CHANGE UP
GLUCOSE SERPL-MCNC: 82 MG/DL — SIGNIFICANT CHANGE UP (ref 70–99)
GLUCOSE SERPL-MCNC: 88 MG/DL — SIGNIFICANT CHANGE UP (ref 70–99)
HCT VFR BLD CALC: 40.4 % — LOW (ref 42–52)
HCT VFR BLD CALC: 41.7 % — LOW (ref 42–52)
HGB BLD-MCNC: 13.3 G/DL — LOW (ref 14–18)
HGB BLD-MCNC: 13.5 G/DL — LOW (ref 14–18)
IMM GRANULOCYTES NFR BLD AUTO: 0.5 % — HIGH (ref 0.1–0.3)
IMM GRANULOCYTES NFR BLD AUTO: 0.6 % — HIGH (ref 0.1–0.3)
INR BLD: 0.94 RATIO — SIGNIFICANT CHANGE UP (ref 0.65–1.3)
LACTATE SERPL-SCNC: 0.8 MMOL/L — SIGNIFICANT CHANGE UP (ref 0.7–2)
LACTATE SERPL-SCNC: 2.5 MMOL/L — HIGH (ref 0.7–2)
LIDOCAIN IGE QN: 40 U/L — SIGNIFICANT CHANGE UP (ref 7–60)
LYMPHOCYTES # BLD AUTO: 1.05 K/UL — LOW (ref 1.2–3.4)
LYMPHOCYTES # BLD AUTO: 1.49 K/UL — SIGNIFICANT CHANGE UP (ref 1.2–3.4)
LYMPHOCYTES # BLD AUTO: 15.9 % — LOW (ref 20.5–51.1)
LYMPHOCYTES # BLD AUTO: 16.5 % — LOW (ref 20.5–51.1)
MAGNESIUM SERPL-MCNC: 2.1 MG/DL — SIGNIFICANT CHANGE UP (ref 1.8–2.4)
MCHC RBC-ENTMCNC: 31.2 PG — HIGH (ref 27–31)
MCHC RBC-ENTMCNC: 31.4 PG — HIGH (ref 27–31)
MCHC RBC-ENTMCNC: 32.4 G/DL — SIGNIFICANT CHANGE UP (ref 32–37)
MCHC RBC-ENTMCNC: 32.9 G/DL — SIGNIFICANT CHANGE UP (ref 32–37)
MCV RBC AUTO: 94.8 FL — HIGH (ref 80–94)
MCV RBC AUTO: 97 FL — HIGH (ref 80–94)
MONOCYTES # BLD AUTO: 0.49 K/UL — SIGNIFICANT CHANGE UP (ref 0.1–0.6)
MONOCYTES # BLD AUTO: 0.65 K/UL — HIGH (ref 0.1–0.6)
MONOCYTES NFR BLD AUTO: 6.9 % — SIGNIFICANT CHANGE UP (ref 1.7–9.3)
MONOCYTES NFR BLD AUTO: 7.7 % — SIGNIFICANT CHANGE UP (ref 1.7–9.3)
NEUTROPHILS # BLD AUTO: 4.71 K/UL — SIGNIFICANT CHANGE UP (ref 1.4–6.5)
NEUTROPHILS # BLD AUTO: 6.98 K/UL — HIGH (ref 1.4–6.5)
NEUTROPHILS NFR BLD AUTO: 74.6 % — SIGNIFICANT CHANGE UP (ref 42.2–75.2)
NRBC BLD AUTO-RTO: 0 /100 WBCS — SIGNIFICANT CHANGE UP (ref 0–0)
NRBC BLD AUTO-RTO: 0 /100 WBCS — SIGNIFICANT CHANGE UP (ref 0–0)
PLATELET # BLD AUTO: 241 K/UL — SIGNIFICANT CHANGE UP (ref 130–400)
PLATELET # BLD AUTO: 258 K/UL — SIGNIFICANT CHANGE UP (ref 130–400)
PMV BLD: 10 FL — SIGNIFICANT CHANGE UP (ref 7.4–10.4)
PMV BLD: 9.7 FL — SIGNIFICANT CHANGE UP (ref 7.4–10.4)
POTASSIUM SERPL-MCNC: 4.4 MMOL/L — SIGNIFICANT CHANGE UP (ref 3.5–5)
POTASSIUM SERPL-MCNC: 4.6 MMOL/L — SIGNIFICANT CHANGE UP (ref 3.5–5)
POTASSIUM SERPL-SCNC: 4.4 MMOL/L — SIGNIFICANT CHANGE UP (ref 3.5–5)
POTASSIUM SERPL-SCNC: 4.6 MMOL/L — SIGNIFICANT CHANGE UP (ref 3.5–5)
PROT SERPL-MCNC: 6.9 G/DL — SIGNIFICANT CHANGE UP (ref 6–8)
PROT SERPL-MCNC: 7 G/DL — SIGNIFICANT CHANGE UP (ref 6–8)
PROTHROM AB SERPL-ACNC: 11.1 SEC — SIGNIFICANT CHANGE UP (ref 9.95–12.87)
RBC # BLD: 4.26 M/UL — LOW (ref 4.7–6.1)
RBC # BLD: 4.3 M/UL — LOW (ref 4.7–6.1)
RBC # FLD: 13 % — SIGNIFICANT CHANGE UP (ref 11.5–14.5)
RBC # FLD: 13.2 % — SIGNIFICANT CHANGE UP (ref 11.5–14.5)
SALICYLATES SERPL-MCNC: <0.3 MG/DL — LOW (ref 4–30)
SODIUM SERPL-SCNC: 141 MMOL/L — SIGNIFICANT CHANGE UP (ref 135–146)
SODIUM SERPL-SCNC: 143 MMOL/L — SIGNIFICANT CHANGE UP (ref 135–146)
TROPONIN T, HIGH SENSITIVITY RESULT: 16 NG/L — SIGNIFICANT CHANGE UP (ref 6–21)
TROPONIN T, HIGH SENSITIVITY RESULT: 16 NG/L — SIGNIFICANT CHANGE UP (ref 6–21)
WBC # BLD: 9.37 K/UL — SIGNIFICANT CHANGE UP (ref 4.8–10.8)
WBC # FLD AUTO: 6.37 K/UL — SIGNIFICANT CHANGE UP (ref 4.8–10.8)
WBC # FLD AUTO: 9.37 K/UL — SIGNIFICANT CHANGE UP (ref 4.8–10.8)

## 2025-03-13 PROCEDURE — 70450 CT HEAD/BRAIN W/O DYE: CPT | Mod: 26

## 2025-03-13 PROCEDURE — 85025 COMPLETE CBC W/AUTO DIFF WBC: CPT

## 2025-03-13 PROCEDURE — 71045 X-RAY EXAM CHEST 1 VIEW: CPT | Mod: 26

## 2025-03-13 PROCEDURE — 36415 COLL VENOUS BLD VENIPUNCTURE: CPT

## 2025-03-13 PROCEDURE — 80053 COMPREHEN METABOLIC PANEL: CPT

## 2025-03-13 PROCEDURE — 97110 THERAPEUTIC EXERCISES: CPT | Mod: GP

## 2025-03-13 PROCEDURE — 83735 ASSAY OF MAGNESIUM: CPT

## 2025-03-13 PROCEDURE — 97116 GAIT TRAINING THERAPY: CPT | Mod: GP

## 2025-03-13 PROCEDURE — 99222 1ST HOSP IP/OBS MODERATE 55: CPT

## 2025-03-13 PROCEDURE — 71260 CT THORAX DX C+: CPT | Mod: 26

## 2025-03-13 PROCEDURE — 97167 OT EVAL HIGH COMPLEX 60 MIN: CPT | Mod: GO

## 2025-03-13 PROCEDURE — 80048 BASIC METABOLIC PNL TOTAL CA: CPT

## 2025-03-13 PROCEDURE — 70450 CT HEAD/BRAIN W/O DYE: CPT | Mod: 26,77,XU

## 2025-03-13 PROCEDURE — 70498 CT ANGIOGRAPHY NECK: CPT | Mod: 26

## 2025-03-13 PROCEDURE — 70496 CT ANGIOGRAPHY HEAD: CPT | Mod: 26

## 2025-03-13 PROCEDURE — 72125 CT NECK SPINE W/O DYE: CPT | Mod: 26

## 2025-03-13 PROCEDURE — 74177 CT ABD & PELVIS W/CONTRAST: CPT | Mod: 26

## 2025-03-13 PROCEDURE — 97163 PT EVAL HIGH COMPLEX 45 MIN: CPT | Mod: GP

## 2025-03-13 PROCEDURE — 99285 EMERGENCY DEPT VISIT HI MDM: CPT | Mod: FS

## 2025-03-13 RX ORDER — BRIMONIDINE TARTRATE 1.5 MG/ML
1 SOLUTION/ DROPS OPHTHALMIC
Refills: 0 | Status: DISCONTINUED | OUTPATIENT
Start: 2025-03-13 | End: 2025-03-17

## 2025-03-13 RX ORDER — ENOXAPARIN SODIUM 100 MG/ML
40 INJECTION SUBCUTANEOUS EVERY 24 HOURS
Refills: 0 | Status: DISCONTINUED | OUTPATIENT
Start: 2025-03-13 | End: 2025-03-17

## 2025-03-13 RX ORDER — DONEPEZIL HYDROCHLORIDE 5 MG/1
23 TABLET ORAL AT BEDTIME
Refills: 0 | Status: DISCONTINUED | OUTPATIENT
Start: 2025-03-13 | End: 2025-03-13

## 2025-03-13 RX ORDER — B1/B2/B3/B5/B6/B12/VIT C/FOLIC 500-0.5 MG
1 TABLET ORAL DAILY
Refills: 0 | Status: DISCONTINUED | OUTPATIENT
Start: 2025-03-13 | End: 2025-03-17

## 2025-03-13 RX ORDER — ATORVASTATIN CALCIUM 80 MG/1
40 TABLET, FILM COATED ORAL AT BEDTIME
Refills: 0 | Status: DISCONTINUED | OUTPATIENT
Start: 2025-03-13 | End: 2025-03-17

## 2025-03-13 RX ORDER — ESCITALOPRAM OXALATE 20 MG/1
10 TABLET ORAL DAILY
Refills: 0 | Status: DISCONTINUED | OUTPATIENT
Start: 2025-03-13 | End: 2025-03-17

## 2025-03-13 RX ORDER — DONEPEZIL HYDROCHLORIDE 5 MG/1
20 TABLET ORAL AT BEDTIME
Refills: 0 | Status: DISCONTINUED | OUTPATIENT
Start: 2025-03-13 | End: 2025-03-17

## 2025-03-13 RX ORDER — LISINOPRIL 5 MG/1
10 TABLET ORAL DAILY
Refills: 0 | Status: DISCONTINUED | OUTPATIENT
Start: 2025-03-13 | End: 2025-03-17

## 2025-03-13 RX ORDER — DORZOLAMIDE 20 MG/ML
1 SOLUTION/ DROPS OPHTHALMIC
Refills: 0 | Status: DISCONTINUED | OUTPATIENT
Start: 2025-03-13 | End: 2025-03-14

## 2025-03-13 RX ORDER — MEMANTINE HYDROCHLORIDE 21 MG/1
10 CAPSULE, EXTENDED RELEASE ORAL
Refills: 0 | Status: DISCONTINUED | OUTPATIENT
Start: 2025-03-13 | End: 2025-03-17

## 2025-03-13 RX ORDER — LATANOPROST PF 0.05 MG/ML
1 SOLUTION/ DROPS OPHTHALMIC AT BEDTIME
Refills: 0 | Status: DISCONTINUED | OUTPATIENT
Start: 2025-03-13 | End: 2025-03-17

## 2025-03-13 RX ADMIN — ENOXAPARIN SODIUM 40 MILLIGRAM(S): 100 INJECTION SUBCUTANEOUS at 21:03

## 2025-03-13 RX ADMIN — DONEPEZIL HYDROCHLORIDE 20 MILLIGRAM(S): 5 TABLET ORAL at 21:02

## 2025-03-13 RX ADMIN — LATANOPROST PF 1 DROP(S): 0.05 SOLUTION/ DROPS OPHTHALMIC at 22:02

## 2025-03-13 RX ADMIN — ATORVASTATIN CALCIUM 40 MILLIGRAM(S): 80 TABLET, FILM COATED ORAL at 21:02

## 2025-03-13 NOTE — ED PROVIDER NOTE - CARE PROVIDERS DIRECT ADDRESSES
[email or web address]

## 2025-03-13 NOTE — ED PROVIDER NOTE - ATTENDING CONTRIBUTION TO CARE
89-year-old presented today from assisted living with a unwitnessed fall.  Patient found to be bleeding from the head.  Patient is demented.  Pan scan performed.  Patient is signed out pending pan scan results.

## 2025-03-13 NOTE — PATIENT PROFILE ADULT - FALL HARM RISK - HARM RISK INTERVENTIONS
Assistance with ambulation/Assistance OOB with selected safe patient handling equipment/Communicate Risk of Fall with Harm to all staff/Reinforce activity limits and safety measures with patient and family/Tailored Fall Risk Interventions/Use of alarms - bed, chair and/or voice tab/Visual Cue: Yellow wristband and red socks/Bed in lowest position, wheels locked, appropriate side rails in place/Call bell, personal items and telephone in reach/Instruct patient to call for assistance before getting out of bed or chair/Non-slip footwear when patient is out of bed/[place] to call system/Physically safe environment - no spills, clutter or unnecessary equipment/Purposeful Proactive Rounding/Room/bathroom lighting operational, light cord in reach

## 2025-03-13 NOTE — H&P ADULT - HISTORY OF PRESENT ILLNESS
89-year-old male with past medical history of hypertension, hyperlipidemia, dementia sent in from nursing facility for weakness. Patient was seen in ED earlier this morning after unwitnessed fall at facility with an unremarkable workup. Facility sent patient back to ED due to weakness and constricted pupils.  A&O x 1 at baseline. denies any other symptoms including fevers, chill, headache, recent illness/travel, cough, abdominal pain, chest pain, or SOB.    On my encounter patient is AXOx 1, comfortably lying at bed, pleasant vitally stable.     ED Course:  Vitals: 150/89 mm Hg, 67 bpm, 20 rpm, Afebrile  Labs:  , Salicylate, Acetaminophen and Alcohol levels: Unremarkable  CT HEAD:  No significant interval change when compared with the prior head CT examination of 3/13/2025 at 12:21 AM.  No evidence of acute intracranial hemorrhage, acute territorial infarct, mass or midline shift.  CTA:  Moderate stenosis at the right carotid bulb.  No evidence of major vascular occlusion or aneurysm.    Patient being admitted for further evaluation and management.

## 2025-03-13 NOTE — H&P ADULT - NSHPLABSRESULTS_GEN_ALL_CORE
Home Medications:  atorvastatin 40 mg oral tablet: 1 tab(s) orally once a day (at bedtime) (13 Mar 2025 18:58)  donepezil 23 mg oral tablet: 1 tab(s) orally once a day (13 Mar 2025 18:58)  escitalopram 10 mg oral tablet: 1 tab(s) orally once a day (13 Mar 2025 18:58)  latanoprost 0.005% ophthalmic solution: 1 drop(s) in each eye once a day (at bedtime) 1 drop in both eyes (13 Mar 2025 18:57)  lisinopril 10 mg oral tablet: 1 tab(s) orally once a day (13 Mar 2025 18:58)  memantine 10 mg oral tablet: 1 tab(s) orally 2 times a day (13 Mar 2025 18:58)  Multiple Vitamins oral tablet: 1 tab(s) orally once a day (13 Mar 2025 18:58)  Simbrinza 1%- 0.2% ophthalmic suspension: 1 drop(s) in each affected eye 2 times a day one drop in left eye (13 Mar 2025 18:56)    MEDICATIONS  (STANDING):    MEDICATIONS  (PRN):        LABS:                        13.3   6.37  )-----------( 241      ( 13 Mar 2025 13:12 )             40.4     03-13    141  |  104  |  15  ----------------------------<  88  4.4   |  27  |  1.1    Ca    9.5      13 Mar 2025 13:12  Mg     2.1     03-13    TPro  6.9  /  Alb  4.3  /  TBili  0.7  /  DBili  x   /  AST  28  /  ALT  19  /  AlkPhos  140[H]  03-13    LIVER FUNCTIONS - ( 13 Mar 2025 13:12 )  Alb: 4.3 g/dL / Pro: 6.9 g/dL / ALK PHOS: 140 U/L / ALT: 19 U/L / AST: 28 U/L / GGT: x               PT/INR - ( 13 Mar 2025 00:10 )   PT: 11.10 sec;   INR: 0.94 ratio         PTT - ( 13 Mar 2025 00:10 )  PTT:31.1 sec  Urinalysis Basic - ( 13 Mar 2025 13:12 )    Color: x / Appearance: x / SG: x / pH: x  Gluc: 88 mg/dL / Ketone: x  / Bili: x / Urobili: x   Blood: x / Protein: x / Nitrite: x   Leuk Esterase: x / RBC: x / WBC x   Sq Epi: x / Non Sq Epi: x / Bacteria: x

## 2025-03-13 NOTE — ED ADULT TRIAGE NOTE - CHIEF COMPLAINT QUOTE
Patient BIBEMS from assisted living, Pt seen in ER last night for unwitnessed fall  and laceration that was sutured. PT sent back into ER today for generalized weakness and constricted pupils. Pt A/OX1 at baseline,

## 2025-03-13 NOTE — H&P ADULT - ASSESSMENT
89-year-old male with past medical history of hypertension, hyperlipidemia, dementia sent in from nursing facility for weakness. Patient being admitted for further evaluation and management.    # Unwitnessed Ground Level Mechanical Fall  # Weakness  # Constricted Pupils, Direct and Indirect Reflex +nt  - Vitals: 150/89 mm Hg, 67 bpm, 20 rpm, Afebrile  - Labs:  , Salicylate, Acetaminophen and Alcohol levels: Unremarkable  CT HEAD:  No significant interval change when compared with the prior head CT examination of 3/13/2025 at 12:21 AM.  No evidence of acute intracranial hemorrhage, acute territorial infarct, mass or midline shift.  CTA:  Moderate stenosis at the right carotid bulb.  No evidence of major vascular occlusion or aneurysm.  CT Chest Abdomen and Pelvis w/ IV Contrast  Cortical discontinuity appearance of the left L3 transverse process could represent age indeterminate nondisplaced fracture (series 5 image 376), new appearing since 7/9/2024.  Otherwise no definite evidence of acute traumatic injury to the chest, abdomen or pelvis.    Plan  - PT Evaluation    # HTN  # HLD  - Continued home Lisinopril 10mg daily  - Continued home Atrovastatin 40mg daily    # Alzheimer disease  # Dementia  - Continued home Memantine 10mg bid  - Continued home Escitalopram 10mg daily   - Continued home donepezil 23mg daily    # Increased IOP  - c/w simbrinza opthalmic suspension  - c/w Latanoprost opthalmic solution    Miscellaneous:  DVT prophylaxis: Lovenox  Bowel  - Feeds:   - Prophylaxis: None  - Regimen: None  Activity: PT Eval  MedRec: Confirmed with NH Papers

## 2025-03-13 NOTE — ED ADULT NURSE NOTE - NSFALLHARMRISKINTERV_ED_ALL_ED
Assistance OOB with selected safe patient handling equipment if applicable/Assistance with ambulation/Communicate risk of Fall with Harm to all staff, patient, and family/Monitor gait and stability/Monitor for mental status changes and reorient to person, place, and time, as needed/Provide visual cue: red socks, yellow wristband, yellow gown, etc/Reinforce activity limits and safety measures with patient and family/Toileting schedule using arm’s reach rule for commode and bathroom/Use of alarms - bed, stretcher, chair and/or video monitoring/Bed in lowest position, wheels locked, appropriate side rails in place/Call bell, personal items and telephone in reach/Instruct patient to call for assistance before getting out of bed/chair/stretcher/Non-slip footwear applied when patient is off stretcher/[place] to call system/Physically safe environment - no spills, clutter or unnecessary equipment/Purposeful Proactive Rounding/Room/bathroom lighting operational, light cord in reach

## 2025-03-13 NOTE — ED ADULT NURSE NOTE - NSFALLHARMRISKINTERV_ED_ALL_ED

## 2025-03-13 NOTE — ED PROVIDER NOTE - CARE PROVIDER_API CALL
Juan Crareno  Internal Medicine  [address] [facility], [address]  [place] [address]  Phone: [phone]  Fax: [phone]  Follow Up Time: 1-3 Days

## 2025-03-13 NOTE — ED ADULT NURSE NOTE - PRIMARY CARE PROVIDER
pcp V-Y Flap Text: The defect edges were debeveled with a #15 scalpel blade.  Given the location of the defect, shape of the defect and the proximity to free margins a V-Y flap was deemed most appropriate.  Using a sterile surgical marker, an appropriate advancement flap was drawn incorporating the defect and placing the expected incisions within the relaxed skin tension lines where possible.    The area thus outlined was incised deep to adipose tissue with a #15 scalpel blade.  The skin margins were undermined to an appropriate distance in all directions utilizing iris scissors.

## 2025-03-13 NOTE — ED PROVIDER NOTE - IV ALTEPLASE ADMIN OUTSIDE HIDDEN
[FreeTextEntry1] : Rt Total knee- painful ? mild mid flex instability otherwise looks good - we will get blood work and try a brace to see if that helps - her left knee only has mild OA so I do not see benefit for TKA at this point - for now we will focus on rt as that is the more painful knee\par 4/16/19: MRI showing lateral and PF OA, no meniscus tear seen. Blood work neg. She is not a candidate for arthroscopy and not enough damage yet for left TKA. Long time discussion regarding outcomes of revision for right knee but for now will just try orthovisc left knee.\par 6/10/19: Auth pending for HA inj left knee and HKB right knee. For now will try PT and Mobic.\par 11/24/20: Continued pain the right knee at the proximal aspect of patella and quad insertion, feels that she has small defect there. Will get MRI of the right knee to look at quad tendon and patella component. Left knee has moderate OA that has progressively gotten worse. Will try injection today. Will also repeat MRI of the left knee to look for progression of her previous injury and to evaluate fibular head lesion to confirm no change.\par 12/18/20: Short term relief from left knee inj - MRI with sig PF OA, recc HA injections. Right TKA with small defect medial retinaculum - unsure if surgery would help at this point so will first recc PT for quad strength.\par 1/26/21: Inj tolerated well.\par 2/2/21: Inj tolerated well.\par 2/9/21: Inj tolerated well.\par 2/22/21: Inj tolerated well. Start PT for both knees.\par 11/23/21: R TKA hardware in place; she continues to have R knee pain. Increased left knee pain and loss of ROM. Discussed pain medication vs. L TKA. She has done CSI and HA injections. Most recent CSI 10/11/21 provided minimal relief. Advised her pain may be idiopathic. Patient would like to proceed with L TKA. Will obtain CT L knee to eval for bone loss and for presurgical evaluation\par 12/13/21: CT confirms advanced OA with lesion in fibular head known from previous MRI from 2019. Planning to proceed with L TKA. Will send to oncologist to confirm nothing needs to be done for fibular head at time of TKA. waiting for Auth for TKA\par 1/21/22: Evaluation of the lesion is begin as per the oncologist. She is planning for TKA after her family issues resolve. She also is being treated for her cspine. Treated with CSI today to alleviate symptoms in the mean time\par 4/11/22: overall right TKA still has significant pain and complex postop course due to lack of PT. Will continue to monitor this.  She has significant OA left knee and is preparing for TKA once she feels she is able to and will continue PT on b/l knees. - Discussed not a candidate for Rev TKA at this time with unknow cause of pain  \par 6/13/22: b/l knee still continued pain, cont PT for both knee building strength plan for L TKA benji assisted waiting for auth- I am conc right knee still gives her pain w/o obbvi reason. Continue to work this up for underlying cause. CT scan showed bone cyst was told it was benign. \par 7/11/22: No change - still with pain in both knees.  Again recc PT to work on strengthening and reeval after 4-6 weeks of treatment.\par 8/22/22: Starting PT this week for both knees and will then reeval.  Still painful right TKA if no improvement will get further workup while we await auth for left TKA.\par 9/26/22: Left knee inj today for acute relief, cont PT and reeval in 6 weeks.  I still feel we should proceed with TKA but will try some more conservative treatments for now while we wait for auth.\par 10/10/22: Continues to have pain and will likely need surgery in the new year. Will continue PT for now and FU in 4 weeks. \par 11/14/22: She is planning for L TKA in the new year. She continues to have pain in the right knee but will re-eval this pain after L TKA and discuss options. At this point, I see no obvious signs for pain in her right TKA at this time and discussed this may be her body's reaction to knee replacements.She understands this and is aware she may feel similar with her left knee but she feels she cannot delay surgery as she cont to loose funcition in the knee. Risks and benefits again discussed and all questions answered. CT reviewed: 2 degrees valgus femur and 3 degrees varus tibia. \par \par 1/31/23: 2 weeks s/p L TKA - She is overall doing well with some soreness, minimal swelling. ROM 3-95 today, advised to work on extension. She will start PT/HEP and follow up in 4 weeks.  show

## 2025-03-13 NOTE — ED ADULT NURSE NOTE - NSFALLASSESSNEED_ED_ALL_ED
Area L Indication Text: Tumors in this location are included in Area L (trunk and extremities).  Mohs surgery is indicated for larger tumors, or tumors with aggressive histologic features, in these anatomic locations. yes 17-Dec-2018 00:00

## 2025-03-13 NOTE — ED ADULT NURSE NOTE - NS ED NOTE  TALK SOMEONE YN
"[facility]  ED Nurse Handoff Report    Ashlie Matthews is a 34 year old female   ED Chief complaint: No chief complaint on file.  . ED Diagnosis:   Final diagnoses:   None     Allergies:   Allergies   Allergen Reactions     Moxifloxacin Hydrochloride Nausea and Vomiting     Avelox-vomiting     Cats Itching     Codeine GI Disturbance     Darvocet [Propoxyphene N-Apap] Hives     Demerol Hives     Dog Hair [Dogs] Unknown     Dust Mites Itching and Swelling     Pollen Extract Itching     Vicodin [Acetaminophen] GI Disturbance     Latex Itching, Swelling and Rash     \"sensitivity\"       Code Status: Full Code  Activity level - Baseline/Home:  Stand with Assist. Activity Level - Current:   Stand with Assist. Lift room needed: No. Bariatric: No   Needed: No   Isolation: Yes. Infection: Not Applicable.     Vital Signs:   Vitals:    01/20/18 1330 01/20/18 1345 01/20/18 1400 01/20/18 1415   BP: 127/75 118/75 122/71 119/81   Pulse:       Resp:       Temp:       TempSrc:       SpO2:       Weight:           Cardiac Rhythm:  ,   Cardiac  Cardiac Rhythm: Normal sinus rhythm  Pain level:    Patient confused: No. Patient Falls Risk: Yes.   Elimination Status: Has voided   Patient Report - Initial Complaint: Vomiting with fainting spells. Focused Assessment: A&O x4 ABC intact.   Tests Performed: 12 HOUR COLLECTION. Abnormal Results:none.   Treatments provided: iv #18 in right AC 2 1000 ml bolus normal saline and third bag normal saline infusing 125/hr.rad    Family Comments: at bedside OBS brochure/video discussed/provided to patient:  Yes  ED Medications:   Medications   0.9% sodium chloride BOLUS (0 mLs Intravenous Stopped 1/20/18 1401)     Followed by   0.9% sodium chloride BOLUS (0 mLs Intravenous Stopped 1/20/18 1140)     Followed by   0.9% sodium chloride infusion ( Intravenous New Bag 1/20/18 1409)   ondansetron (ZOFRAN) injection 4 mg (4 mg Intravenous Given 1/20/18 1108)   LORazepam (ATIVAN) " injection 0.5 mg (0.05 mg Intravenous Given 1/20/18 1108)     Drips infusing:  Yes  For the majority of the shift, the patient's behavior Green. Interventions performed were Radiology.     Severe Sepsis OR Septic Shock Diagnosis Present: No      ED Nurse Name/Phone Number: Beth Blairdavi,   2:30 PM  RECEIVING UNIT ED HANDOFF REVIEW    Above ED Nurse Handoff Report was reviewed: Yes  Reviewed by: Elizaebt Lehman on January 20, 2018 at 3:41 PM      No

## 2025-03-13 NOTE — ED PROVIDER NOTE - CLINICAL SUMMARY MEDICAL DECISION MAKING FREE TEXT BOX
89-year-old male with past medical history of hypertension, hyperlipidemia, dementia sent in from nursing facility for weakness. Patient was seen here the day before with negative scan.  This morning woke up they noted having pinpoint pupils.  Repeat CT no acute changes.  Patient admitted for further evaluation.

## 2025-03-13 NOTE — ED PROVIDER NOTE - ATTENDING APP SHARED VISIT CONTRIBUTION OF CARE
pt with recent DC from hospital for fall, negative scans, woke up this morning with pinpoint pupils, a&Ox1 at baseline.    pupils 1mm BL responsive to light. neuro exam otherwise unremarkable    CT CTA , r/o tox vs neuro

## 2025-03-13 NOTE — H&P ADULT - NSHPPHYSICALEXAM_GEN_ALL_CORE
Physical Examination   CONSTITUTIONAL: Not in acute distress  NEUROLOGICAL: Alert and oriented x 1  EYES: Pupils equal, Round and reactive to light, Constricted 1 mm  CARDIAC: Normal rate, Regular rhythm.    RESPIRATORY: No wheezing, No crackles, Normal chest expansion, Not tachypneic, No use of accessory muscles  GASTROINTESTINAL: Abdomen soft, Non-tender, No guarding, + BS  EXTREMITIES:  2+ Peripheral Pulses, No clubbing, cyanosis, or edema

## 2025-03-13 NOTE — ED PROVIDER NOTE - PATIENT PORTAL LINK FT
You can access the FollowMyHealth Patient Portal offered by [facility] by registering at the following website: [email or web address]. By joining [employer]’s FollowMyHealth portal, you will also be able to view your health information using other applications (apps) compatible with our system.

## 2025-03-13 NOTE — ED PROVIDER NOTE - PHYSICAL EXAMINATION
VITAL SIGNS: I have reviewed nursing notes and confirm.  CONSTITUTIONAL: Well-developed; well-nourished; in no acute distress.  HEAD: 2 cm laceration lateral to the right eyebrow overlying a small hematoma. Does not grimace to palpation. Rest of head is atraumatic.   EYES: PERRL, EOM intact; conjunctiva and sclera clear.  ENT: No blood in the mouth or nares. MMM.  NECK: Supple; non tender. No midline C spine ttp  CARD: S1, S2 normal; no murmurs, gallops, or rubs. Regular rate and rhythm. 2+ distal pulses  RESP: Normal respiratory effort, no tachypnea or distress. Lungs CTAB, no wheezes, rales or rhonchi. Chest wall is non-tender to palpation.  BACK: No midline c/t/l/s spinal or paraspinal ttp. No step offs.   ABD: soft, NT/ND. Pelvis is stable.   EXT: Normal ROM. No clubbing, cyanosis or edema.  Neuro: GCS14 (E4V4M6).  Pleasantly confused.   PSYCH: Cooperative, appropriate.

## 2025-03-13 NOTE — ED ADULT NURSE NOTE - NSFALLLASTDATE_ED_ALL_ED_DT
Head,  normocephalic,  atraumatic,  Face,  Face within normal limits,  Ears,  External ears within normal limits,  Nose/Nasopharynx,  External nose  normal appearance,  nares patent,  no nasal discharge,  Mouth and Throat,  Oral cavity appearance normal,  Breath odor normal,  Lips,  Appearance normal
13-Mar-2025 00:31

## 2025-03-13 NOTE — ED PROVIDER NOTE - CARE PLAN
1 Principal Discharge DX:	Fall   Principal Discharge DX:	Closed head injury  Secondary Diagnosis:	Fall

## 2025-03-13 NOTE — ED PROVIDER NOTE - PROGRESS NOTE DETAILS
CT head negative for intracranial pathology.  CT cervical spine shows no acute fracture or subluxation.  CT chest shows no intrathoracic pathology.  CT abdomen pelvis shows age-indeterminate left L3 transverse process nondisplaced fracture.  Results discussed with family at bedside. Patient has no midline or paraspinal tenderness to palpation of the spine.  Patient able to ambulate without difficulty.  Son at bedside like patient to be discharged.  Will DC patient home with PMD follow-up.  Return cautions given to son bedside.

## 2025-03-13 NOTE — H&P ADULT - ATTENDING COMMENTS
Patient seen and examined and service provided on 3/13/25    89-year-old male with past medical history of hypertension, hyperlipidemia, dementia sent in from nursing facility for weakness.   patient pleasantly confused; follows commands; states mostly yes or no to questions    PHYSICAL EXAM  GEN: no distress, comfortable and pleasant  pupils reactive b/l  PULM: BS heard b/l equal, No wheezing  CVS: S1S2 present, no rubs or gallops  ABD: Soft, non-distended, no guarding; non-tender  EXT: No lower extremity edema  NEURO: A&Ox1, moving all extremities    A&P    # Unwitnessed Mechanical Fall  # Weakness  trauma work up- neg; except age indeterminant non displaced fractureL3 transverse process; no complaints of pain  EKG- sinus bradycardia- patient on dozepezil; monitor HR  Trop X2-  neg  PT/OT  fall precautions  check orthostatic    # HTN  # HLD  # Alzheimer disease  # Dementia  # Increased IOP  continue home medications    Diet: soft and bite sized diet- monitor for any s/o aspiration    DVT prophylaxis: Lovenox    TIme spent 55 mnts

## 2025-03-13 NOTE — ED PROVIDER NOTE - OBJECTIVE STATEMENT
89 yo M with PMH dementia, CHF, HTN, HLD who presents to the ED from his nursing home after an unwitnessed ground level fall. He was noted to have head trauma on his forehead. At baseline is AOx1. He is pleasantly confused and unable to provide any history.

## 2025-03-13 NOTE — ED ADULT NURSE NOTE - CHIEF COMPLAINT QUOTE
Unwitnessed fall from [facility] assisted living, he was bleeding from the head, no anticoagulants,  full code, Alzheimer's, you are not getting much out of him - EMS

## 2025-03-13 NOTE — ED PROVIDER NOTE - CLINICAL SUMMARY MEDICAL DECISION MAKING FREE TEXT BOX
89-year-old presented today from assisted living with a unwitnessed fall.  Labs were ordered and reviewed.  Imaging was ordered and reviewed by me.  Patient's records (prior hospital, ED visit, and/or nursing home notes if available) were reviewed.  Additional history was obtained from son at bedside.  Escalation to admission/observation was considered.  Discussed all results with son.  Patient with indeterminate L3 fracture but patient without any tenderness.  Ambulates independently.  Son confirms normal gait and states he feels patient is safe to return to facility and prefers outpatient follow up as needed.  Discussed strict return precautions and outpatient follow up.  Son comfortable with plan.

## 2025-03-14 LAB
ALBUMIN SERPL ELPH-MCNC: 3.8 G/DL — SIGNIFICANT CHANGE UP (ref 3.5–5.2)
ALP SERPL-CCNC: 138 U/L — HIGH (ref 30–115)
ALT FLD-CCNC: 15 U/L — SIGNIFICANT CHANGE UP (ref 0–41)
ANION GAP SERPL CALC-SCNC: 11 MMOL/L — SIGNIFICANT CHANGE UP (ref 7–14)
AST SERPL-CCNC: 23 U/L — SIGNIFICANT CHANGE UP (ref 0–41)
BASOPHILS # BLD AUTO: 0.03 K/UL — SIGNIFICANT CHANGE UP (ref 0–0.2)
BASOPHILS NFR BLD AUTO: 0.4 % — SIGNIFICANT CHANGE UP (ref 0–1)
BILIRUB SERPL-MCNC: 0.5 MG/DL — SIGNIFICANT CHANGE UP (ref 0.2–1.2)
BUN SERPL-MCNC: 15 MG/DL — SIGNIFICANT CHANGE UP (ref 10–20)
CALCIUM SERPL-MCNC: 9.3 MG/DL — SIGNIFICANT CHANGE UP (ref 8.4–10.5)
CHLORIDE SERPL-SCNC: 105 MMOL/L — SIGNIFICANT CHANGE UP (ref 98–110)
CO2 SERPL-SCNC: 25 MMOL/L — SIGNIFICANT CHANGE UP (ref 17–32)
CREAT SERPL-MCNC: 1 MG/DL — SIGNIFICANT CHANGE UP (ref 0.7–1.5)
EGFR: 71 ML/MIN/1.73M2 — SIGNIFICANT CHANGE UP
EGFR: 71 ML/MIN/1.73M2 — SIGNIFICANT CHANGE UP
EOSINOPHIL # BLD AUTO: 0.15 K/UL — SIGNIFICANT CHANGE UP (ref 0–0.7)
EOSINOPHIL NFR BLD AUTO: 2.2 % — SIGNIFICANT CHANGE UP (ref 0–8)
GLUCOSE SERPL-MCNC: 94 MG/DL — SIGNIFICANT CHANGE UP (ref 70–99)
HCT VFR BLD CALC: 39.7 % — LOW (ref 42–52)
HGB BLD-MCNC: 13 G/DL — LOW (ref 14–18)
IMM GRANULOCYTES NFR BLD AUTO: 0.6 % — HIGH (ref 0.1–0.3)
LYMPHOCYTES # BLD AUTO: 15.2 % — LOW (ref 20.5–51.1)
MCHC RBC-ENTMCNC: 30.7 PG — SIGNIFICANT CHANGE UP (ref 27–31)
MCHC RBC-ENTMCNC: 32.7 G/DL — SIGNIFICANT CHANGE UP (ref 32–37)
MCV RBC AUTO: 93.6 FL — SIGNIFICANT CHANGE UP (ref 80–94)
MONOCYTES # BLD AUTO: 0.54 K/UL — SIGNIFICANT CHANGE UP (ref 0.1–0.6)
MONOCYTES NFR BLD AUTO: 8 % — SIGNIFICANT CHANGE UP (ref 1.7–9.3)
NEUTROPHILS NFR BLD AUTO: 73.6 % — SIGNIFICANT CHANGE UP (ref 42.2–75.2)
NRBC BLD AUTO-RTO: 0 /100 WBCS — SIGNIFICANT CHANGE UP (ref 0–0)
PLATELET # BLD AUTO: 234 K/UL — SIGNIFICANT CHANGE UP (ref 130–400)
PMV BLD: 10.3 FL — SIGNIFICANT CHANGE UP (ref 7.4–10.4)
POTASSIUM SERPL-MCNC: 4.2 MMOL/L — SIGNIFICANT CHANGE UP (ref 3.5–5)
POTASSIUM SERPL-SCNC: 4.2 MMOL/L — SIGNIFICANT CHANGE UP (ref 3.5–5)
PROT SERPL-MCNC: 6.5 G/DL — SIGNIFICANT CHANGE UP (ref 6–8)
RBC # BLD: 4.24 M/UL — LOW (ref 4.7–6.1)
RBC # FLD: 13.1 % — SIGNIFICANT CHANGE UP (ref 11.5–14.5)
SODIUM SERPL-SCNC: 141 MMOL/L — SIGNIFICANT CHANGE UP (ref 135–146)
WBC # BLD: 6.72 K/UL — SIGNIFICANT CHANGE UP (ref 4.8–10.8)
WBC # FLD AUTO: 6.72 K/UL — SIGNIFICANT CHANGE UP (ref 4.8–10.8)

## 2025-03-14 PROCEDURE — 99232 SBSQ HOSP IP/OBS MODERATE 35: CPT

## 2025-03-14 RX ADMIN — MEMANTINE HYDROCHLORIDE 10 MILLIGRAM(S): 21 CAPSULE, EXTENDED RELEASE ORAL at 05:31

## 2025-03-14 RX ADMIN — DONEPEZIL HYDROCHLORIDE 20 MILLIGRAM(S): 5 TABLET ORAL at 22:23

## 2025-03-14 RX ADMIN — ESCITALOPRAM OXALATE 10 MILLIGRAM(S): 20 TABLET ORAL at 11:43

## 2025-03-14 RX ADMIN — BRIMONIDINE TARTRATE 1 DROP(S): 1.5 SOLUTION/ DROPS OPHTHALMIC at 17:50

## 2025-03-14 RX ADMIN — LATANOPROST PF 1 DROP(S): 0.05 SOLUTION/ DROPS OPHTHALMIC at 22:23

## 2025-03-14 RX ADMIN — ATORVASTATIN CALCIUM 40 MILLIGRAM(S): 80 TABLET, FILM COATED ORAL at 22:23

## 2025-03-14 RX ADMIN — Medication 1 TABLET(S): at 11:43

## 2025-03-14 RX ADMIN — MEMANTINE HYDROCHLORIDE 10 MILLIGRAM(S): 21 CAPSULE, EXTENDED RELEASE ORAL at 17:58

## 2025-03-14 RX ADMIN — ENOXAPARIN SODIUM 40 MILLIGRAM(S): 100 INJECTION SUBCUTANEOUS at 22:23

## 2025-03-14 RX ADMIN — DORZOLAMIDE 1 DROP(S): 20 SOLUTION/ DROPS OPHTHALMIC at 05:31

## 2025-03-14 RX ADMIN — LISINOPRIL 10 MILLIGRAM(S): 5 TABLET ORAL at 05:31

## 2025-03-14 RX ADMIN — BRIMONIDINE TARTRATE 1 DROP(S): 1.5 SOLUTION/ DROPS OPHTHALMIC at 05:31

## 2025-03-14 NOTE — CONSULT NOTE ADULT - ASSESSMENT
89-year-old male with past medical history of hypertension, hyperlipidemia, dementia sent in from nursing facility for weakness and pinpoint pupils b/l. Neuro called for pinpoint pupils. On exam, he has miosis but doesn't have pinpoint pupil and both are reactive to light. Pt has glaucoma and is taking Brimonidine (alpha 2 agonist) and latanoprost, both of these drugs can cause miosis. CTH and CTA H&N were neg. 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    Recommendations:  - no further neuro workup  - glaucoma treatment per primary team    Case discussed with attending Dr. Shante Troy MD  PGY3, Neurology 89-year-old male with past medical history of hypertension, hyperlipidemia, dementia sent in from nursing facility for weakness and pinpoint pupils b/l. Neuro called for pinpoint pupils. On exam, he has miosis but doesn't have pinpoint pupil and both are reactive to light. Pt has glaucoma and is taking Brimonidine (alpha 2 agonist) and latanoprost, both of these drugs can cause miosis. CTH and CTA H&N were neg.     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 Shante Troy MD  PGY3, Neurology

## 2025-03-14 NOTE — OCCUPATIONAL THERAPY INITIAL EVALUATION ADULT - PERTINENT HX OF CURRENT PROBLEM, REHAB EVAL
89-year-old male with past medical history of hypertension, hyperlipidemia, dementia sent in from nursing facility for weakness. Patient was seen in ED earlier this morning after unwitnessed fall at facility with an unremarkable workup. Facility sent patient back to ED due to weakness and constricted pupils.  A&O x 1 at baseline. denies any other symptoms including fevers, chill, headache, recent illness/travel, cough, abdominal pain, chest pain, or SOB.

## 2025-03-14 NOTE — OCCUPATIONAL THERAPY INITIAL EVALUATION ADULT - LEVEL OF INDEPENDENCE, REHAB EVAL
Ketan is a 55 year old who is being evaluated via a billable video visit.      Is Pt currently in [place]? Yes      How would you like to obtain your AVS? MyChart  If the video visit is dropped, the invitation should be resent by: Text to cell phone: [phone]  Will anyone else be joining your video visit? No      Video Start Time: 7:59am  Video-Visit Details    Type of service:  Video Visit    Video End Time:8:13am    Originating Location (pt. Location): Home    Distant Location (provider location):  [facility]     Platform used for Video Visit: [facility]    CHIEF COMPLAINT:  Pain  -neck and low back pain     INTERVAL HISTORY:  Last seen on 21 virtually       Recommendations/plan at the last visit included:  1. Physical Therapy:  Not at this time, but he should continue his exercises  2. Clinical Health Psychologist:  NO  3. Diagnostic Studies: none  4. Medication Management:    1. Continue Lyrica 150 mg TID  2. Continue nortriptyline to 50 mg at bedtime.  3. Continue Oxycodone 5m-2 tablets every 4-6 hours as needed. Max of 6 tabs in a day.   4. Consider Butrans. Will need to taper Oxycodone to a max of 4 tabs/day for 1 week before starting.  5. Further procedures recommended: Plan for SCS trial once approved. Keep transforaminal SRIDEVI as scheduled   6. Recommendations to PCP. See above     Follow up with this provider: 4  Weeks for a virtual visit    Since his last visit, Indra Mehta reports:    Things are about the same. His insurance declined the appeal for the spinal cord stimulator. He had an injection and thinks that it has helped a little. He states that he still gets pain with bringing his leg up. He states that he spoke with Dr. Mcdaniel and stated that for the last 1/5 months he has had a different pain on his right sided lower back he has a deep pain that is constant but it becomes more severe with reaching, bending over or lifting his right leg. He states that this is  new/different. He states that he thought it was the muscle but pushing on that muscle does not irritate it.    He states that daily by the end of the day he has much more pain in his neck with activity. He states that his daughters have helped him with his garden.    He states that he continues to take Oxycodone 5-6/day. He states that his pain is pretty well controlled on this regimen unless he has spikes in his pain. He states that his feet continue to get worse. He states that he won't be able to walk if this continues. He does feel that Lyrica is helping as it does seem to knock down the neuropathy.      Pain Information:                   Pain today 5/10       UDS: 2020-reviewed and appropriate  Controlled Substance Agreement signed: 2020     CURRENT RELEVANT PAIN MEDICATIONS:  Tizanidine 4mg-taking 1 at HS-hasn't been using recently   Tylenol 500mg-1000mg 4x/day  Xanax-does not use daily  Nortriptyline 50mg at bedtime  Oxycodone 5m-2 tablets every 4-6 hours, max of 6/day  Lyrica 150 mg TID     Patient is using the medication as prescribed:  YES  Is your medication helpful?     YES   Medication side effects? no side effect     Previous Medications: (H--helped; HI--Helped initially; SWH-- somewhat helpful, NH--No help; W--worse; SE--side effects)   Norco/vicodin H  Oxycodone H  Flexeril - was helpful at night  Robaxin - not helpful  Tizanidine H  Gabapentin ?  Lyrica SE sedation  Cymbalta ?     Past Pain Treatments:  Injections:    - 18 bilateral L3-4 TFESI - (Dr Mcdaniel)  - 18 bilateral L3-4 TFESI - seems to have worsened pain (Dr Mcdaniel)  - 18 TFESI right C5-6 - helped with arm pain and numbness  - 17 bilateral L3-4 TFESI   - 10/23/17 Bilateral L4-5 TFESI   - 2018 Bilateral L3-4 TFESI   -2019 Bilateral L3-4 TFESI   -2019 C6-7 SRIDEVI   -2020 L3-4 bilateral TLESI  -21 L3-4 bilateral TLESI  Surgery:  ACDF C6-7 on 17 with improvement; ACDF C5-6  12/19/2018, posterior C5-7 fusion. Lateral mass screws, right C5-6 medial facetectomy, right C6 foraminotomy on 10/29/2019  TENS unit: helpful     [place] Board of Pharmacy Data Base Reviewed:    YES; As expected, no concern for misuse/abuse of controlled medications based on this report.     THE 4 As OF OPIOID MAINTENANCE ANALGESIA    Analgesia: Is pain relief clinically significant? YES   Activity: Is patient functional and able to perform Activities of Daily Living? YES   Adverse effects: Is patient free from adverse side effects from opiates? YES   Adherence to Rx protocol: Is patient adhering to Controlled Substance Agreement and taking medications ONLY as ordered? YES         Is Narcan prescribed for opiate use >50 MME daily? N/A        Daily MME: 37.5-45     Medications:  Current Outpatient Prescriptions          Current Outpatient Medications   Medication Sig Dispense Refill     Acetaminophen (TYLENOL PO) Take 500 mg by mouth every 4 hours as needed for mild pain or fever         ALPRAZolam (XANAX XR) 0.5 MG 24 hr tablet Take 1 tablet (0.5 mg) by mouth At Bedtime 30 tablet 3     gabapentin (NEURONTIN) 400 MG capsule Take 3 capsules (1,200 mg) by mouth 3 times daily 270 capsule 3     losartan (COZAAR) 50 MG tablet Take 1 tablet (50 mg) by mouth daily 90 tablet 0     nortriptyline (PAMELOR) 25 MG capsule Take 1 capsule (25 mg) by mouth At Bedtime 30 capsule 0     order for DME Equipment being ordered: TENS Pads as directed 1 Device 0     oxyCODONE (ROXICODONE) 5 MG tablet Take 1 tab every 4-6 hours as needed for severe pain. MAX 4 TABS PER DAY. Okay to fill 3/12/20 start 3/18/20. 100 tablet 0     tiZANidine (ZANAFLEX) 4 MG tablet Take 1 tablet (4 mg) by mouth At Bedtime 90 tablet 0               Assessment:  Indra Mehta is a 55 year old male who presents today for follow up regarding his:     1.  Cervicalgia  2.  S/p cervical spine fusion  3.  Myofascial pain  4. Lumbar radiculopathy  5.  Neuropathy   6. Chronic pain syndrome  7. Chronic use of opioids    Overall things are stable to slightly worse. He has a new pain on his right side. We did discuss potentially getting a new MRI but decided to wait and give the injection more time to work. Lyrica does seem to be helping with his neuropathy and we discussed that we can increase this dose if needed.         Plan:     Diagnosis reviewed, treatment option addressed, and risk/benifits discussed.  Self-care instructions given.  I am recommending a multidisciplinary treatment plan to help this patient better manage pain.       1. Physical Therapy:  Not at this time, but he should continue his exercises  2. Clinical Health Psychologist:  NO  3. Diagnostic Studies: none  4. Medication Management:    1. Continue Lyrica 150 mg TID  2. Continue nortriptyline to 50 mg at bedtime.  3. Continue Oxycodone 5m-2 tablets every 4-6 hours as needed. Max of 6 tabs in a day.   4. Consider Butrans. Will need to taper Oxycodone to a max of 4 tabs/day for 1 week before starting. Could also consider OxyContin  5. Further procedures recommended: none at this time. Insurance denied appeal for SCS.  6. Recommendations to PCP. See above    Follow up with this provider: 4 Weeks for a virtual visit    The total TIME spent on this patient on the day of the appointment was 19 minutes.    Time spent preparing to see the patient (reviewing records and tests) 2 minutes  Time spend face to face with the patient 14 minutes  Time spent ordering tests, medications, procedures and referrals 0  minutes  Time spent Referring and communicating with other healthcare professionals 0 minutes  Time spent documenting clinical information in Epic 3 minutes        Chiara Garcia PA-C   [facility]     minimum assist (75% patients effort)

## 2025-03-14 NOTE — PHYSICAL THERAPY INITIAL EVALUATION ADULT - ADDITIONAL COMMENTS
As per pt sons--> Prior to admission, pt was living in assisted living at [place]. Pt was ambulatory with no use of Assistive Device in the home. However, sons reports that they have noticed unsteadiness on feet.

## 2025-03-14 NOTE — OCCUPATIONAL THERAPY INITIAL EVALUATION ADULT - ADDITIONAL COMMENTS
Pt is a poor historian, collateral information acquired from son present at bedside. Pt resides in [facility] assisted living, + stall shower with seat and grab bar. Per son pt did not require assistance with ADL,

## 2025-03-14 NOTE — PHYSICAL THERAPY INITIAL EVALUATION ADULT - NSPTDISCHREC_GEN_A_CORE
return to [facility] with therapy return to [facility] with PT/OT services

## 2025-03-14 NOTE — OCCUPATIONAL THERAPY INITIAL EVALUATION ADULT - GENERAL OBSERVATIONS, REHAB EVAL
Pt encountered reclined in bed, + IV locked, + son present at bedside. Pt agreeable to bedside OT assessment, may be seen as confirmed with RN. Pt returned to bed as found, + IV locked, + son present at bedside

## 2025-03-14 NOTE — PHYSICAL THERAPY INITIAL EVALUATION ADULT - PERTINENT HX OF CURRENT PROBLEM, REHAB EVAL
89-year-old male with past medical history of hypertension, hyperlipidemia, dementia sent in from nursing facility for weakness. Patient was seen in ED earlier this morning after unwitnessed fall at facility with an unremarkable workup. Facility sent patient back to ED due to weakness and constricted pupils.  A&O x 1 at baseline. denies any other symptoms including fevers, chill, headache, recent illness/travel, cough, abdominal pain, chest pain, or SOB.    Pt. referred to PT for eval and tx.    Pt BP was taken supine,sitting and standing. Results for supine were MAP 98 and /74. results for sitting were MAP 91 /73. Results for standing were MAP 77 /59. Pt did not complain of dizziness or lightheadedness (no indication of orthostatic symptoms.)

## 2025-03-14 NOTE — CONSULT NOTE ADULT - SUBJECTIVE AND OBJECTIVE BOX
Neurology Consult  89-year-old male with past medical history of hypertension, hyperlipidemia, dementia sent in from nursing facility for weakness. Patient was seen in ED earlier this morning after unwitnessed fall at facility with an unremarkable workup. Facility sent patient back to ED due to weakness and constricted pupils.  A&O x 1 at baseline. denies any other symptoms including fevers, chill, headache, recent illness/travel, cough, abdominal pain, chest pain, or SOB.    On my encounter patient is AXOx 1, comfortably lying at bed, pleasant vitally stable.     ED Course:  Vitals: 150/89 mm Hg, 67 bpm, 20 rpm, Afebrile  Labs:  , Salicylate, Acetaminophen and Alcohol levels: Unremarkable  CT HEAD:  No significant interval change when compared with the prior head CT examination of 3/13/2025 at 12:21 AM.  No evidence of acute intracranial hemorrhage, acute territorial infarct, mass or midline shift.  CTA:  Moderate stenosis at the right carotid bulb.  No evidence of major vascular occlusion or aneurysm.    Patient being admitted for further evaluation and management.  Neuro consulted for pinpoint pupils.      PAST MEDICAL & SURGICAL HISTORY:  Alzheimer disease      Hypertension      Dementia      Heart failure      No significant past surgical history          FAMILY HISTORY:  No pertinent family history in first degree relatives        Social History: (-) x 3    Allergies    No Known Allergies    Intolerances        MEDICATIONS  (STANDING):  atorvastatin 40 milliGRAM(s) Oral at bedtime  brimonidine 0.2% Ophthalmic Solution 1 Drop(s) Left EYE two times a day  donepezil 20 milliGRAM(s) Oral at bedtime  enoxaparin Injectable 40 milliGRAM(s) SubCutaneous every 24 hours  escitalopram 10 milliGRAM(s) Oral daily  latanoprost 0.005% Ophthalmic Solution 1 Drop(s) Both EYES at bedtime  lisinopril 10 milliGRAM(s) Oral daily  memantine 10 milliGRAM(s) Oral two times a day  multivitamin 1 Tablet(s) Oral daily    MEDICATIONS  (PRN):      Review of systems:    as per HPI      Vital Signs Last 24 Hrs  T(C): 36.7 (14 Mar 2025 14:04), Max: 36.7 (13 Mar 2025 22:07)  T(F): 98.1 (14 Mar 2025 14:04), Max: 98.1 (13 Mar 2025 22:07)  HR: 75 (14 Mar 2025 14:04) (57 - 79)  BP: 130/82 (14 Mar 2025 14:04) (130/82 - 173/76)  BP(mean): 99 (14 Mar 2025 05:22) (99 - 103)  RR: 18 (14 Mar 2025 14:04) (18 - 18)  SpO2: 98% (14 Mar 2025 14:04) (98% - 100%)    Parameters below as of 14 Mar 2025 14:04  Patient On (Oxygen Delivery Method): room air        Examination:  General:  Appearance is consistent with chronologic age.  No abnormal facies.  Gross skin survey within normal limits.    Cognitive/Language:  The patient is oriented to person, place, time and date.  Recent and remote memory intact.  Fund of knowledge is intact and normal.  Language with normal repetition, comprehension and naming.  Nondysarthric.    Eyes: intact VA, VFF.  EOMI w/o nystagmus, skew or reported double vision.  PERRL.  No ptosis/weakness of eyelid closure.    Face:  Facial sensation normal V1 - 3, no facial asymmetry.    Ears/Nose/Throat:  Hearing grossly intact b/l.  Palate elevates midline.  Tongue and uvula midline.   Motor examination:   Normal tone, bulk and range of motion.  No tenderness, twitching, tremors or involuntary movements.  Formal Muscle Strength Testing: (MRC grade R/L) 5/5 UE; 5/5 LE.  No observable drift.  Reflexes:   2+ b/l pectoralis, biceps, triceps, brachioradialis, patella and Achilles.  Plantar response downgoing b/l.  Jaw jerk, Sushil, clonus absent.  Sensory examination:   Intact to light touch and pinprick, pain, temperature and proprioception and vibration in all extremities.  Cerebellum:   FTN/HKS intact with normal TROY in all limbs.  No dysmetria or dysdiadokinesia.  Gait narrow based and normal.        Labs:   CBC Full  -  ( 14 Mar 2025 08:00 )  WBC Count : 6.72 K/uL  RBC Count : 4.24 M/uL  Hemoglobin : 13.0 g/dL  Hematocrit : 39.7 %  Platelet Count - Automated : 234 K/uL  Mean Cell Volume : 93.6 fL  Mean Cell Hemoglobin : 30.7 pg  Mean Cell Hemoglobin Concentration : 32.7 g/dL  Auto Neutrophil # : x  Auto Lymphocyte # : x  Auto Monocyte # : x  Auto Eosinophil # : x  Auto Basophil # : x  Auto Neutrophil % : x  Auto Lymphocyte % : x  Auto Monocyte % : x  Auto Eosinophil % : x  Auto Basophil % : x    03-14    141  |  105  |  15  ----------------------------<  94  4.2   |  25  |  1.0    Ca    9.3      14 Mar 2025 08:00  Mg     2.1     03-13    TPro  6.5  /  Alb  3.8  /  TBili  0.5  /  DBili  x   /  AST  23  /  ALT  15  /  AlkPhos  138[H]  03-14    LIVER FUNCTIONS - ( 14 Mar 2025 08:00 )  Alb: 3.8 g/dL / Pro: 6.5 g/dL / ALK PHOS: 138 U/L / ALT: 15 U/L / AST: 23 U/L / GGT: x           PT/INR - ( 13 Mar 2025 00:10 )   PT: 11.10 sec;   INR: 0.94 ratio         PTT - ( 13 Mar 2025 00:10 )  PTT:31.1 sec  Urinalysis Basic - ( 14 Mar 2025 08:00 )    Color: x / Appearance: x / SG: x / pH: x  Gluc: 94 mg/dL / Ketone: x  / Bili: x / Urobili: x   Blood: x / Protein: x / Nitrite: x   Leuk Esterase: x / RBC: x / WBC x   Sq Epi: x / Non Sq Epi: x / Bacteria: x          Neuroimaging:  NCHCT: CT Head No Cont:   ACC: [id number] EXAM:  CT ANGIO BRAIN (W)AW IC   ORDERED BY: CAL CHEN     ACC: [id number] EXAM:  CT ANGIO NECK (W)AW IC   ORDERED BY: CAL CHEN     ACC: [id number] EXAM:  CT BRAIN   ORDERED BY: CAL CHEN     PROCEDURE DATE:  03/13/2025          INTERPRETATION:  CLINICAL INFORMATION:  Weakness.  Pinpoint pupils. Neuro   exam otherwise unremarkable. WBC 6.37  PMHx of Alzheimer's Dx, HTN,   Dementia, HLD, and CHF    COMPARISON: Head CT dated 3/13/2025 at 12:21 AM    COMPARISON: Head and Neck CTA dated        CONTRAST/COMPLICATIONS:  IV Contrast:   The patient received 100 ml of Omnipaque 350 with 0 ml   discarded.  Oral Contrast:  None  Complications:  None reported at time of study completion    TECHNIQUE - NONCONTRAST HEAD CT. Contiguous unenhanced CT axial images of   the head from the base to the vertex with coronal and sagittal reformats.    TECHNIQUE - CT ANGIOGRAM OF THE HEAD AND NECK: Axial contiguous images   were obtained of the head and neck following the intravenous   administration of contrast. Reformatted images in the coronal and   sagittal planes were acquired, as well as 3D volume rendering, MIP   reconstructed images.      REFERENCE PER NASCET criteria for degree of stenosis: Mild: less than 50%   stenosis. Moderate: 50-69% stenosis. Severe: 70-94% stenosis. Near   occlusion: 95-99% stenosis.      FINDINGS - HEAD CT:    There is prominence of the ventricles, cortical sulci, and basal cisterns   appropriate for the patient's age.    Grey-white matter differentiation is preserved.    Carotid siphon calcifications are noted.    There are patchy hypodensities throughout the hemispheric white matter   without mass effect compatible with chronic microvascular changes.    The fourth ventricle is midline.    There is no acute territorial infarct, intracranial hemorrhage,   extra-axial fluid collection or midline shift.    Calvarium is intact. No evidence of depressed skull fracture.    There is mild mucosal thickening within the left maxillary antrum,   otherwise the visualized paranasal sinuses and mastoids are well-aerated.        FINDINGS - NECK CTA:    There is atherosclerotic calcification of the aortic arch. The visualized   aortic arch and great vessel origins are patent. There is no stenosis at   the origin of the right brachiocephalic, right and left common carotid,   left subclavian, and right and left vertebral arteries. The left common   carotid artery arises from a common trunk with the right brachiocephalic   artery (anatomic variation). The left vertebral artery arises directly   from the aortic arch.    The right and left common carotid arteries are patent.    There is mild calcification but no flow-limiting stenosis at the left   common carotid artery bifurcation.  There is no evidence of significant   stenosis or occlusion at the left carotid bulb, or along the course of   the distal cervical portion of the left internal carotid artery.    There is calcification at the right common carotid artery bifurcation,   with moderate stenosis at the right carotid bulb. There is no evidence of   flow-limiting stenosis or occlusion along the course of the distal   cervical portion of the right internal carotid artery.      Normal branching pattern is noted of the bilateral external carotid   arteries.    The vertebral arteries are patent. The left vertebral artery is dominant.    FINDINGS - HEAD CTA:    The distal right and left internal carotid arteries are patent with mild   calcification but no flow-limiting stenosis in the region of the petrous,   cavernous and supraclinoid portions of the ICAs.    The ophthalmic arteries are patent.    There is normal contrast filling of the middle cerebral arteries and the   anterior cerebral arteries bilaterally. No evidence of stenosis,   occlusion or aneurysm.    The distal vertebral arteries are patent. The basilar artery is patent   but of small caliber. There is normal filling of the PCAs and SCAs   bilaterally. The PCAs are fed by small P1 segments and posterior   communicating arteries bilaterally. No evidence of stenosis, occlusion or   aneurysm.      No venous abnormalities are noted. No evidence of filling defects within   the venous sinuses.    OTHER:  Lung Apices: Clear    IMPRESSION:    CT HEAD:  No significant interval change when compared with the prior head CT   examination of 3/13/2025 at 12:21 AM.  No evidence of acute intracranial hemorrhage, acute territorial infarct,   mass or midline shift.      CTA:  Moderate stenosis at the right carotid bulb.  No evidence of major vascular occlusion or aneurysm.    --- End of Report ---            TESFAYE FLORES MD; Attending Interventional Radiologist  This document has been electronically signed. Mar 13 2025  4:45PM (03-13-25 @ 16:14)      03-14-25 @ 15:42       Neurology Consult  89-year-old male with past medical history of hypertension, hyperlipidemia, dementia sent in from nursing facility for weakness. Patient was seen in ED earlier this morning after unwitnessed fall at facility with an unremarkable workup. Facility sent patient back to ED due to weakness and constricted pupils.  A&O x 1 at baseline. denies any other symptoms including fevers, chill, headache, recent illness/travel, cough, abdominal pain, chest pain, or SOB.    On my encounter patient is AXOx 1, comfortably lying at bed, pleasant vitally stable.     ED Course:  Vitals: 150/89 mm Hg, 67 bpm, 20 rpm, Afebrile  Labs:  , Salicylate, Acetaminophen and Alcohol levels: Unremarkable  CT HEAD:  No significant interval change when compared with the prior head CT examination of 3/13/2025 at 12:21 AM.  No evidence of acute intracranial hemorrhage, acute territorial infarct, mass or midline shift.  CTA:  Moderate stenosis at the right carotid bulb.  No evidence of major vascular occlusion or aneurysm.    Patient being admitted for further evaluation and management.  Neuro consulted for pinpoint pupils.      PAST MEDICAL & SURGICAL HISTORY:  Alzheimer disease      Hypertension      Dementia      Heart failure      No significant past surgical history          FAMILY HISTORY:  No pertinent family history in first degree relatives        Social History: (-) x 3    Allergies    No Known Allergies    Intolerances        MEDICATIONS  (STANDING):  atorvastatin 40 milliGRAM(s) Oral at bedtime  brimonidine 0.2% Ophthalmic Solution 1 Drop(s) Left EYE two times a day  donepezil 20 milliGRAM(s) Oral at bedtime  enoxaparin Injectable 40 milliGRAM(s) SubCutaneous every 24 hours  escitalopram 10 milliGRAM(s) Oral daily  latanoprost 0.005% Ophthalmic Solution 1 Drop(s) Both EYES at bedtime  lisinopril 10 milliGRAM(s) Oral daily  memantine 10 milliGRAM(s) Oral two times a day  multivitamin 1 Tablet(s) Oral daily    MEDICATIONS  (PRN):      Review of systems:    as per HPI      Vital Signs Last 24 Hrs  T(C): 36.7 (14 Mar 2025 14:04), Max: 36.7 (13 Mar 2025 22:07)  T(F): 98.1 (14 Mar 2025 14:04), Max: 98.1 (13 Mar 2025 22:07)  HR: 75 (14 Mar 2025 14:04) (57 - 79)  BP: 130/82 (14 Mar 2025 14:04) (130/82 - 173/76)  BP(mean): 99 (14 Mar 2025 05:22) (99 - 103)  RR: 18 (14 Mar 2025 14:04) (18 - 18)  SpO2: 98% (14 Mar 2025 14:04) (98% - 100%)    Parameters below as of 14 Mar 2025 14:04  Patient On (Oxygen Delivery Method): room air        Examination:  General:  Appearance is consistent with chronologic age.  No abnormal facies.  Gross skin survey within normal limits.    Cognitive/Language:  The patient is awake, alert and oriented to person only. unable to recall 3 immediate recalls, following only simple commands  Eyes: pupils 2mm b/l reactive to light;  intact VFF with BTT b/l present. intact EOMI w/o nystagmus, skew or reported double vision.  PERRL.  No ptosis/weakness of eyelid closure.    Face: Facial sensation normal V1 - 3, no facial asymmetry  Ears/Nose/Throat:  Hearing grossly intact b/l.   Tongue midline.   Motor examination:   moving all 4 extremities spontaneously; 4/5 in all 4 ext; R hand  decreased  Reflexes:   2+ b/l  biceps, triceps, brachioradialis, patella   Sensory examination:   Intact to light touch and pinprick in all extremities.  Cerebellum:   FTN intact.  No dysmetria or dysdiadokinesia.  Gait deferred        Labs:   CBC Full  -  ( 14 Mar 2025 08:00 )  WBC Count : 6.72 K/uL  RBC Count : 4.24 M/uL  Hemoglobin : 13.0 g/dL  Hematocrit : 39.7 %  Platelet Count - Automated : 234 K/uL  Mean Cell Volume : 93.6 fL  Mean Cell Hemoglobin : 30.7 pg  Mean Cell Hemoglobin Concentration : 32.7 g/dL  Auto Neutrophil # : x  Auto Lymphocyte # : x  Auto Monocyte # : x  Auto Eosinophil # : x  Auto Basophil # : x  Auto Neutrophil % : x  Auto Lymphocyte % : x  Auto Monocyte % : x  Auto Eosinophil % : x  Auto Basophil % : x    03-14    141  |  105  |  15  ----------------------------<  94  4.2   |  25  |  1.0    Ca    9.3      14 Mar 2025 08:00  Mg     2.1     03-13    TPro  6.5  /  Alb  3.8  /  TBili  0.5  /  DBili  x   /  AST  23  /  ALT  15  /  AlkPhos  138[H]  03-14    LIVER FUNCTIONS - ( 14 Mar 2025 08:00 )  Alb: 3.8 g/dL / Pro: 6.5 g/dL / ALK PHOS: 138 U/L / ALT: 15 U/L / AST: 23 U/L / GGT: x           PT/INR - ( 13 Mar 2025 00:10 )   PT: 11.10 sec;   INR: 0.94 ratio         PTT - ( 13 Mar 2025 00:10 )  PTT:31.1 sec  Urinalysis Basic - ( 14 Mar 2025 08:00 )    Color: x / Appearance: x / SG: x / pH: x  Gluc: 94 mg/dL / Ketone: x  / Bili: x / Urobili: x   Blood: x / Protein: x / Nitrite: x   Leuk Esterase: x / RBC: x / WBC x   Sq Epi: x / Non Sq Epi: x / Bacteria: x          Neuroimaging:  NCHCT: CT Head No Cont:   ACC: [id number] EXAM:  CT ANGIO BRAIN (W)AW IC   ORDERED BY: CAL CHEN     ACC: [id number] EXAM:  CT ANGIO NECK (W)AW IC   ORDERED BY: CAL CHEN     ACC: [id number] EXAM:  CT BRAIN   ORDERED BY: CAL CHEN     PROCEDURE DATE:  03/13/2025          INTERPRETATION:  CLINICAL INFORMATION:  Weakness.  Pinpoint pupils. Neuro   exam otherwise unremarkable. WBC 6.37  PMHx of Alzheimer's Dx, HTN,   Dementia, HLD, and CHF    COMPARISON: Head CT dated 3/13/2025 at 12:21 AM    COMPARISON: Head and Neck CTA dated        CONTRAST/COMPLICATIONS:  IV Contrast:   The patient received 100 ml of Omnipaque 350 with 0 ml   discarded.  Oral Contrast:  None  Complications:  None reported at time of study completion    TECHNIQUE - NONCONTRAST HEAD CT. Contiguous unenhanced CT axial images of   the head from the base to the vertex with coronal and sagittal reformats.    TECHNIQUE - CT ANGIOGRAM OF THE HEAD AND NECK: Axial contiguous images   were obtained of the head and neck following the intravenous   administration of contrast. Reformatted images in the coronal and   sagittal planes were acquired, as well as 3D volume rendering, MIP   reconstructed images.      REFERENCE PER NASCET criteria for degree of stenosis: Mild: less than 50%   stenosis. Moderate: 50-69% stenosis. Severe: 70-94% stenosis. Near   occlusion: 95-99% stenosis.      FINDINGS - HEAD CT:    There is prominence of the ventricles, cortical sulci, and basal cisterns   appropriate for the patient's age.    Grey-white matter differentiation is preserved.    Carotid siphon calcifications are noted.    There are patchy hypodensities throughout the hemispheric white matter   without mass effect compatible with chronic microvascular changes.    The fourth ventricle is midline.    There is no acute territorial infarct, intracranial hemorrhage,   extra-axial fluid collection or midline shift.    Calvarium is intact. No evidence of depressed skull fracture.    There is mild mucosal thickening within the left maxillary antrum,   otherwise the visualized paranasal sinuses and mastoids are well-aerated.        FINDINGS - NECK CTA:    There is atherosclerotic calcification of the aortic arch. The visualized   aortic arch and great vessel origins are patent. There is no stenosis at   the origin of the right brachiocephalic, right and left common carotid,   left subclavian, and right and left vertebral arteries. The left common   carotid artery arises from a common trunk with the right brachiocephalic   artery (anatomic variation). The left vertebral artery arises directly   from the aortic arch.    The right and left common carotid arteries are patent.    There is mild calcification but no flow-limiting stenosis at the left   common carotid artery bifurcation.  There is no evidence of significant   stenosis or occlusion at the left carotid bulb, or along the course of   the distal cervical portion of the left internal carotid artery.    There is calcification at the right common carotid artery bifurcation,   with moderate stenosis at the right carotid bulb. There is no evidence of   flow-limiting stenosis or occlusion along the course of the distal   cervical portion of the right internal carotid artery.      Normal branching pattern is noted of the bilateral external carotid   arteries.    The vertebral arteries are patent. The left vertebral artery is dominant.    FINDINGS - HEAD CTA:    The distal right and left internal carotid arteries are patent with mild   calcification but no flow-limiting stenosis in the region of the petrous,   cavernous and supraclinoid portions of the ICAs.    The ophthalmic arteries are patent.    There is normal contrast filling of the middle cerebral arteries and the   anterior cerebral arteries bilaterally. No evidence of stenosis,   occlusion or aneurysm.    The distal vertebral arteries are patent. The basilar artery is patent   but of small caliber. There is normal filling of the PCAs and SCAs   bilaterally. The PCAs are fed by small P1 segments and posterior   communicating arteries bilaterally. No evidence of stenosis, occlusion or   aneurysm.      No venous abnormalities are noted. No evidence of filling defects within   the venous sinuses.    OTHER:  Lung Apices: Clear    IMPRESSION:    CT HEAD:  No significant interval change when compared with the prior head CT   examination of 3/13/2025 at 12:21 AM.  No evidence of acute intracranial hemorrhage, acute territorial infarct,   mass or midline shift.      CTA:  Moderate stenosis at the right carotid bulb.  No evidence of major vascular occlusion or aneurysm.    --- End of Report ---            TESFAYE FLORES MD; Attending Interventional Radiologist  This document has been electronically signed. Mar 13 2025  4:45PM (03-13-25 @ 16:14)      03-14-25 @ 15:42

## 2025-03-14 NOTE — PROGRESS NOTE ADULT - ATTENDING COMMENTS
***My note supersedes any discrepancies that may be above in the resident's note***    89-year-old male with past medical history of hypertension, hyperlipidemia, dementia sent in from nursing facility for weakness. Patient being admitted for further evaluation and management.    # Unwitnessed Ground Level Mechanical Fall  # Weakness  # Constricted Pupils, Direct and Indirect Reflex +nt  - Vitals: 150/89 mm Hg, 67 bpm, 20 rpm, Afebrile  - Labs:  , Salicylate, Acetaminophen and Alcohol levels: Unremarkable  CT HEAD:  No significant interval change when compared with the prior head CT examination of 3/13/2025 at 12:21 AM.  No evidence of acute intracranial hemorrhage, acute territorial infarct, mass or midline shift.  CTA:  Moderate stenosis at the right carotid bulb.  No evidence of major vascular occlusion or aneurysm.  CT Chest Abdomen and Pelvis w/ IV Contrast  Cortical discontinuity appearance of the left L3 transverse process could represent age indeterminate nondisplaced fracture (series 5 image 376), new appearing since 7/9/2024.  Otherwise no definite evidence of acute traumatic injury to the chest, abdomen or pelvis.    Plan  - PT Evaluation recommending rehab  - Constricted pupils could be secondary to dorzolamide. will stop for now and reassess  - No opioids     # HTN  # HLD  - Continued home Lisinopril 10mg daily  - Continued home Atrovastatin 40mg daily    # Alzheimer disease  # Dementia  - Continued home Memantine 10mg bid  - Continued home Escitalopram 10mg daily   - Continued home donepezil 23mg daily    # Increased IOP  - c/w simbrinza opthalmic suspension  - c/w Latanoprost opthalmic solution    Miscellaneous:  DVT prophylaxis: Lovenox  Bowel  - Feeds: soft bite sized  - Prophylaxis: None  Activity: PT Eval    #Progress Note Handoff  Pending (specify):  Neuro consult RE bilateral pinpoint pupils and change in behavior per son  Family discussion: updated at bedside  Disposition: Unknown at this time________ ***My note supersedes any discrepancies that may be above in the resident's note***    89-year-old male with past medical history of hypertension, hyperlipidemia, dementia sent in from nursing facility for weakness. Patient being admitted for further evaluation and management.    # Unwitnessed Ground Level Mechanical Fall  # Weakness  # Constricted Pupils, Direct and Indirect Reflex +nt  - Vitals: 150/89 mm Hg, 67 bpm, 20 rpm, Afebrile  - Labs:  , Salicylate, Acetaminophen and Alcohol levels: Unremarkable  - CT HEAD: No significant interval change when compared with the prior head CT examination of 3/13/2025 at 12:21 AM. No evidence of acute intracranial hemorrhage, acute territorial infarct, mass or midline shift.  - CTA: Moderate stenosis at the right carotid bulb. No evidence of major vascular occlusion or aneurysm. CT Chest Abdomen and Pelvis w/ IV Contrast Cortical discontinuity appearance of the left L3 transverse process could represent age indeterminate nondisplaced fracture (series 5 image 376), new appearing since 7/9/2024. Otherwise no definite evidence of acute traumatic injury to the chest, abdomen or pelvis.  - PT Evaluation recommending rehab  - Constricted pupils could be secondary to dorzolamide. will stop for now and reassess  - No opioids   - Per son, requesting Neuro evaluation     # HTN  # HLD  - Continued home Lisinopril 10mg daily  - Continued home Atrovastatin 40mg daily    # Alzheimer disease  # Dementia  - Continued home Memantine 10mg bid  - Continued home Escitalopram 10mg daily   - Continued home donepezil 23mg daily    # Increased IOP  - c/w simbrinza opthalmic suspension  - c/w Latanoprost opthalmic solution    Miscellaneous:  DVT prophylaxis: Lovenox  Bowel  - Feeds: soft bite sized  - Prophylaxis: None  Activity: PT Eval    #Progress Note Handoff  Pending (specify):  Neuro consult RE bilateral pinpoint pupils and change in behavior per son  Family discussion: updated at bedside  Disposition: Unknown at this time________

## 2025-03-14 NOTE — PHYSICAL THERAPY INITIAL EVALUATION ADULT - LIVES WITH, PROFILE
Pt to tx suite for pump disconnect. All medication infused pt tolerated tx without incident. Port flushed with 20 cc NS and de-accessed. Pt left ambulatory   
Pt is a resident at [facility]).

## 2025-03-14 NOTE — PROGRESS NOTE ADULT - SUBJECTIVE AND OBJECTIVE BOX
Patient is a 89y old Male who presents with a chief complaint of Generalized weakness and miosis (13 Mar 2025 18:58)    No acute or major events overnight. Patient was seen at bedside.AOx1, minimally verbal. ROS was negative.  Patient is HD stable, afebrile, Saturating well on RA. Familty at bedside reported that he usually is AOx1 but sometimes now he is mumbling and not talking clearly. They also report that he usually can change his clothes and go to the bathroom. They think he is a lot weaker now.  Patient tolerates PO,     ALLERGIES:  No Known Allergies    MEDICATIONS:  STANDING MEDICATIONS  atorvastatin 40 milliGRAM(s) Oral at bedtime  brimonidine 0.2% Ophthalmic Solution 1 Drop(s) Left EYE two times a day  donepezil 20 milliGRAM(s) Oral at bedtime  enoxaparin Injectable 40 milliGRAM(s) SubCutaneous every 24 hours  escitalopram 10 milliGRAM(s) Oral daily  latanoprost 0.005% Ophthalmic Solution 1 Drop(s) Both EYES at bedtime  lisinopril 10 milliGRAM(s) Oral daily  memantine 10 milliGRAM(s) Oral two times a day  multivitamin 1 Tablet(s) Oral daily    PRN MEDICATIONS      VITALS LAST 24H:  Vital Signs Last 24 Hrs  T(C): 36.7 (14 Mar 2025 05:22), Max: 36.7 (13 Mar 2025 22:07)  T(F): 98.1 (14 Mar 2025 05:22), Max: 98.1 (13 Mar 2025 22:07)  HR: 70 (14 Mar 2025 05:22) (57 - 79)  BP: 146/76 (14 Mar 2025 05:22) (141/84 - 173/76)  BP(mean): 99 (14 Mar 2025 05:22) (99 - 103)  RR: 18 (14 Mar 2025 05:22) (18 - 18)  SpO2: 99% (14 Mar 2025 05:22) (98% - 100%)    Parameters below as of 14 Mar 2025 05:22  Patient On (Oxygen Delivery Method): room air        PHYSICAL EXAM:  GENERAL: NAD, lying in bed comfortably  HEENT:  EOMI, Moist mucous membranes  CHEST/LUNG: Clear to auscultation bilaterally; normal respiratory effort,  HEART: Regular rate and rhythm, no murrmurs  ABDOMEN: Soft, nontender, nondistended, Bowel sounds present  EXTREMITIES:  2+ Peripheral Pulses, brisk capillary refill. No lower extremity edema,  NERVOUS SYSTEM:  A&Ox1, CN intact, Upper extremities 4/5 motor, normal sensory, LE 5/5 on the left, 4/5 on the right, no sensory deficit, no bowel bladder dysfunction    LABS:                        13.0   6.72  )-----------( 234      ( 14 Mar 2025 08:00 )             39.7     03-14    141  |  105  |  15  ----------------------------<  94  4.2   |  25  |  1.0    Ca    9.3      14 Mar 2025 08:00  Mg     2.1     03-13    TPro  6.5  /  Alb  3.8  /  TBili  0.5  /  DBili  x   /  AST  23  /  ALT  15  /  AlkPhos  138[H]  03-14    PT/INR - ( 13 Mar 2025 00:10 )   PT: 11.10 sec;   INR: 0.94 ratio         PTT - ( 13 Mar 2025 00:10 )  PTT:31.1 sec  Urinalysis Basic - ( 14 Mar 2025 08:00 )    Color: x / Appearance: x / SG: x / pH: x  Gluc: 94 mg/dL / Ketone: x  / Bili: x / Urobili: x   Blood: x / Protein: x / Nitrite: x   Leuk Esterase: x / RBC: x / WBC x   Sq Epi: x / Non Sq Epi: x / Bacteria: x                RADIOLOGY:  CXR  Xray Chest 1 View- PORTABLE-Urgent:   ACC: [id number] EXAM:  XR CHEST PORTABLE URGENT 1V   ORDERED BY: CAL CHEN     PROCEDURE DATE:  03/13/2025          INTERPRETATION:  CLINICAL HISTORY: Weakness    COMPARISON: Chest radiograph dated July 9, 2024.    TECHNIQUE: Portable frontal chest radiograph.    FINDINGS/  IMPRESSION:    Support devices: None.    Cardiac/mediastinum/hilum: Rounded radiopaque density overlying the lower   chest, may be external to the patient.    Lung parenchyma/Pleura: Unchanged bilateral calcified pleural plaques. No   pleural effusions or pneumothorax.    Skeleton/soft tissues: Stable.    --- End of Report ---            LIZ HORTON MD; Attending Radiologist  This document has been electronically signed. Mar 13 2025  2:01PM (03-13-25 @ 12:49)      CT  CT Chest w/ IV Cont:   ACC: [id number] EXAM:  CT CHEST IC   ORDERED BY: GOPI MANN     ACC: [id number] EXAM:  CT ABDOMEN AND PELVIS IC   ORDERED BY: GOPI MANN     PROCEDURE DATE:  03/13/2025          INTERPRETATION:  CLINICAL INFORMATION: Possible fall. Full CT trauma   imaging requested.    COMPARISON: CT abdomen pelvis 7/9/2024. No prior chest CT available for   comparison.    CONTRAST/COMPLICATIONS:  IV Contrast: Omnipaque 350 (accession [id number]), IV contrast documented   in unlinked concurrent exam (accession [id number])  95 cc administered   5   cc discarded  Oral Contrast: NONE  .    PROCEDURE:  CT of the Chest, Abdomen and Pelvis was performed.  Sagittal and coronal reformats were performed.    FINDINGS:  CHEST:  LUNGS/PLEURA/AIRWAYS: Trachea is clear. No acute airspace consolidation.   No pleural effusion or pneumothorax. Diffuse calcified pleural plaques   likely secondary to prior asbestos exposure. Dependent atelectatic change.  VESSELS: Atherosclerosis. No evidence of acute central pulmonary   embolism. Direct origin of the left vertebral artery from the aortic   arch, normal variant.  HEART: Coronary calcifications. Aortic root/valve calcifications.  MEDIASTINUM AND NINO: No lymphadenopathy.  CHEST WALL AND LOWER NECK: Mild bilateral gynecomastia.    ABDOMEN AND PELVIS:  LIVER: Right hepatic lobe subcentimeter hypodensities too small to   further characterize. Otherwise within normal limits.  BILE DUCTS: Normal caliber.  GALLBLADDER: Within normal limits.  SPLEEN: Scattered splenic calcifications.  PANCREAS: Within normal limits.  ADRENALS: Within normal limits.  KIDNEYS/URETERS: Symmetric enhancement. No hydronephrosis. Bilateral   renal cysts and subcentimeter hypodensities too small to further   characterize.    BLADDER: Within normal limits.  REPRODUCTIVE ORGANS: Prostate is enlarged.    BOWEL: No bowel obstruction. Colonic diverticulosis. Appendix is not   visualized. No evidence of inflammation in the pericecal region.  PERITONEUM/RETROPERITONEUM: Within normal limits.  VESSELS: Atherosclerotic calcifications within the aorta and its branches.  LYMPH NODES: No lymphadenopathy.  ABDOMINAL WALL: Small fat-containing umbilical hernia.  BONES: Diffuse osteopenia. Degenerative changes. Mild retrolisthesis of   L4 onL5 and L5 on S1. Chronic right posterior 11th and 12th rib   fractures. Cortical discontinuity appearance of the left L3 transverse   process could represent age indeterminate nondisplaced fracture (series 5   image 376), new appearing since 7/9/2024.    IMPRESSION:  1.  Cortical discontinuity appearance of the left L3 transverse process   could represent age indeterminate nondisplaced fracture (series 5 image   376), new appearing since 7/9/2024.  2.  Otherwise no definite evidence of acute traumatic injury to the   chest, abdomen or pelvis.      --- End of Report ---          SANDY EDWARD MD; Resident Radiologist  This document has been electronically signed.  LG BOJORQUEZ MD; Attending Radiologist  This document has been electronically signed. Mar 13 2025  3:15AM (03-13-25 @ 00:43)  CT Abdomen and Pelvis w/ IV Cont:   ACC: [id number] EXAM:  CT CHEST IC   ORDERED BY: GOPI MANN     ACC: [id number] EXAM:  CT ABDOMEN AND PELVIS IC   ORDERED BY: GOPI MANN     PROCEDURE DATE:  03/13/2025          INTERPRETATION:  CLINICAL INFORMATION: Possible fall. Full CT trauma   imaging requested.    COMPARISON: CT abdomen pelvis 7/9/2024. No prior chest CT available for   comparison.    CONTRAST/COMPLICATIONS:  IV Contrast: Omnipaque 350 (accession [id number]), IV contrast documented   in unlinked concurrent exam (accession [id number])  95 cc administered   5   cc discarded  Oral Contrast: NONE  .    PROCEDURE:  CT of the Chest, Abdomen and Pelvis was performed.  Sagittal and coronal reformats were performed.    FINDINGS:  CHEST:  LUNGS/PLEURA/AIRWAYS: Trachea is clear. No acute airspace consolidation.   No pleural effusion or pneumothorax. Diffuse calcified pleural plaques   likely secondary to prior asbestos exposure. Dependent atelectatic change.  VESSELS: Atherosclerosis. No evidence of acute central pulmonary   embolism. Direct origin of the left vertebral artery from the aortic   arch, normal variant.  HEART: Coronary calcifications. Aortic root/valve calcifications.  MEDIASTINUM AND NINO: No lymphadenopathy.  CHEST WALL AND LOWER NECK: Mild bilateral gynecomastia.    ABDOMEN AND PELVIS:  LIVER: Right hepatic lobe subcentimeter hypodensities too small to   further characterize. Otherwise within normal limits.  BILE DUCTS: Normal caliber.  GALLBLADDER: Within normal limits.  SPLEEN: Scattered splenic calcifications.  PANCREAS: Within normal limits.  ADRENALS: Within normal limits.  KIDNEYS/URETERS: Symmetric enhancement. No hydronephrosis. Bilateral   renal cysts and subcentimeter hypodensities too small to further   characterize.    BLADDER: Within normal limits.  REPRODUCTIVE ORGANS: Prostate is enlarged.    BOWEL: No bowel obstruction. Colonic diverticulosis. Appendix is not   visualized. No evidence of inflammation in the pericecal region.  PERITONEUM/RETROPERITONEUM: Within normal limits.  VESSELS: Atherosclerotic calcifications within the aorta and its branches.  LYMPH NODES: No lymphadenopathy.  ABDOMINAL WALL: Small fat-containing umbilical hernia.  BONES: Diffuse osteopenia. Degenerative changes. Mild retrolisthesis of   L4 onL5 and L5 on S1. Chronic right posterior 11th and 12th rib   fractures. Cortical discontinuity appearance of the left L3 transverse   process could represent age indeterminate nondisplaced fracture (series 5   image 376), new appearing since 7/9/2024.    IMPRESSION:  1.  Cortical discontinuity appearance of the left L3 transverse process   could represent age indeterminate nondisplaced fracture (series 5 image   376), new appearing since 7/9/2024.  2.  Otherwise no definite evidence of acute traumatic injury to the   chest, abdomen or pelvis.      --- End of Report ---          SANDY EDWARD MD; Resident Radiologist  This document has been electronically signed.  LG BOJORQUEZ MD; Attending Radiologist  This document has been electronically signed. Mar 13 2025  3:15AM (03-13-25 @ 00:43)           Patient is a 89y old Male who presents with a chief complaint of Generalized weakness and miosis (13 Mar 2025 18:58)    No acute or major events overnight. Patient was seen at bedside.AOx1, minimally verbal. ROS was negative.  Patient is HD stable, afebrile, Saturating well on RA. Familty at bedside reported that he usually is AOx1 but sometimes now he is mumbling and not talking clearly. They also report that he usually can change his clothes and go to the bathroom. They think he is a lot weaker now.  Patient tolerates PO,     ALLERGIES:  No Known Allergies    MEDICATIONS:  STANDING MEDICATIONS  atorvastatin 40 milliGRAM(s) Oral at bedtime  brimonidine 0.2% Ophthalmic Solution 1 Drop(s) Left EYE two times a day  donepezil 20 milliGRAM(s) Oral at bedtime  enoxaparin Injectable 40 milliGRAM(s) SubCutaneous every 24 hours  escitalopram 10 milliGRAM(s) Oral daily  latanoprost 0.005% Ophthalmic Solution 1 Drop(s) Both EYES at bedtime  lisinopril 10 milliGRAM(s) Oral daily  memantine 10 milliGRAM(s) Oral two times a day  multivitamin 1 Tablet(s) Oral daily    PRN MEDICATIONS      VITALS LAST 24H:  Vital Signs Last 24 Hrs  T(C): 36.7 (14 Mar 2025 05:22), Max: 36.7 (13 Mar 2025 22:07)  T(F): 98.1 (14 Mar 2025 05:22), Max: 98.1 (13 Mar 2025 22:07)  HR: 70 (14 Mar 2025 05:22) (57 - 79)  BP: 146/76 (14 Mar 2025 05:22) (141/84 - 173/76)  BP(mean): 99 (14 Mar 2025 05:22) (99 - 103)  RR: 18 (14 Mar 2025 05:22) (18 - 18)  SpO2: 99% (14 Mar 2025 05:22) (98% - 100%)    Parameters below as of 14 Mar 2025 05:22  Patient On (Oxygen Delivery Method): room air        PHYSICAL EXAM:  GENERAL: NAD, lying in bed comfortably  HEENT:  EOMI, Moist mucous membranes  CHEST/LUNG: Clear to auscultation bilaterally; normal respiratory effort,  HEART: Regular rate and rhythm, no murrmurs  ABDOMEN: Soft, nontender, nondistended, Bowel sounds present  EXTREMITIES:  2+ Peripheral Pulses, brisk capillary refill. No lower extremity edema,  NERVOUS SYSTEM:  A&Ox1, pinpoint pupils, CN intact, Upper extremities 4/5 motor, normal sensory, LE 5/5 on the left, 4/5 on the right, no sensory deficit, no bowel bladder dysfunction    LABS:                        13.0   6.72  )-----------( 234      ( 14 Mar 2025 08:00 )             39.7     03-14    141  |  105  |  15  ----------------------------<  94  4.2   |  25  |  1.0    Ca    9.3      14 Mar 2025 08:00  Mg     2.1     03-13    TPro  6.5  /  Alb  3.8  /  TBili  0.5  /  DBili  x   /  AST  23  /  ALT  15  /  AlkPhos  138[H]  03-14    PT/INR - ( 13 Mar 2025 00:10 )   PT: 11.10 sec;   INR: 0.94 ratio         PTT - ( 13 Mar 2025 00:10 )  PTT:31.1 sec  Urinalysis Basic - ( 14 Mar 2025 08:00 )    Color: x / Appearance: x / SG: x / pH: x  Gluc: 94 mg/dL / Ketone: x  / Bili: x / Urobili: x   Blood: x / Protein: x / Nitrite: x   Leuk Esterase: x / RBC: x / WBC x   Sq Epi: x / Non Sq Epi: x / Bacteria: x                RADIOLOGY:  CXR  Xray Chest 1 View- PORTABLE-Urgent:   ACC: [id number] EXAM:  XR CHEST PORTABLE URGENT 1V   ORDERED BY: CAL CHEN     PROCEDURE DATE:  03/13/2025          INTERPRETATION:  CLINICAL HISTORY: Weakness    COMPARISON: Chest radiograph dated July 9, 2024.    TECHNIQUE: Portable frontal chest radiograph.    FINDINGS/  IMPRESSION:    Support devices: None.    Cardiac/mediastinum/hilum: Rounded radiopaque density overlying the lower   chest, may be external to the patient.    Lung parenchyma/Pleura: Unchanged bilateral calcified pleural plaques. No   pleural effusions or pneumothorax.    Skeleton/soft tissues: Stable.    --- End of Report ---            LIZ HORTON MD; Attending Radiologist  This document has been electronically signed. Mar 13 2025  2:01PM (03-13-25 @ 12:49)      CT  CT Chest w/ IV Cont:   ACC: [id number] EXAM:  CT CHEST IC   ORDERED BY: GOPI MANN     ACC: [id number] EXAM:  CT ABDOMEN AND PELVIS IC   ORDERED BY: GOPI MANN     PROCEDURE DATE:  03/13/2025          INTERPRETATION:  CLINICAL INFORMATION: Possible fall. Full CT trauma   imaging requested.    COMPARISON: CT abdomen pelvis 7/9/2024. No prior chest CT available for   comparison.    CONTRAST/COMPLICATIONS:  IV Contrast: Omnipaque 350 (accession [id number]), IV contrast documented   in unlinked concurrent exam (accession [id number])  95 cc administered   5   cc discarded  Oral Contrast: NONE  .    PROCEDURE:  CT of the Chest, Abdomen and Pelvis was performed.  Sagittal and coronal reformats were performed.    FINDINGS:  CHEST:  LUNGS/PLEURA/AIRWAYS: Trachea is clear. No acute airspace consolidation.   No pleural effusion or pneumothorax. Diffuse calcified pleural plaques   likely secondary to prior asbestos exposure. Dependent atelectatic change.  VESSELS: Atherosclerosis. No evidence of acute central pulmonary   embolism. Direct origin of the left vertebral artery from the aortic   arch, normal variant.  HEART: Coronary calcifications. Aortic root/valve calcifications.  MEDIASTINUM AND NINO: No lymphadenopathy.  CHEST WALL AND LOWER NECK: Mild bilateral gynecomastia.    ABDOMEN AND PELVIS:  LIVER: Right hepatic lobe subcentimeter hypodensities too small to   further characterize. Otherwise within normal limits.  BILE DUCTS: Normal caliber.  GALLBLADDER: Within normal limits.  SPLEEN: Scattered splenic calcifications.  PANCREAS: Within normal limits.  ADRENALS: Within normal limits.  KIDNEYS/URETERS: Symmetric enhancement. No hydronephrosis. Bilateral   renal cysts and subcentimeter hypodensities too small to further   characterize.    BLADDER: Within normal limits.  REPRODUCTIVE ORGANS: Prostate is enlarged.    BOWEL: No bowel obstruction. Colonic diverticulosis. Appendix is not   visualized. No evidence of inflammation in the pericecal region.  PERITONEUM/RETROPERITONEUM: Within normal limits.  VESSELS: Atherosclerotic calcifications within the aorta and its branches.  LYMPH NODES: No lymphadenopathy.  ABDOMINAL WALL: Small fat-containing umbilical hernia.  BONES: Diffuse osteopenia. Degenerative changes. Mild retrolisthesis of   L4 onL5 and L5 on S1. Chronic right posterior 11th and 12th rib   fractures. Cortical discontinuity appearance of the left L3 transverse   process could represent age indeterminate nondisplaced fracture (series 5   image 376), new appearing since 7/9/2024.    IMPRESSION:  1.  Cortical discontinuity appearance of the left L3 transverse process   could represent age indeterminate nondisplaced fracture (series 5 image   376), new appearing since 7/9/2024.  2.  Otherwise no definite evidence of acute traumatic injury to the   chest, abdomen or pelvis.      --- End of Report ---          SANDY EDWARD MD; Resident Radiologist  This document has been electronically signed.  LG BOJORQUEZ MD; Attending Radiologist  This document has been electronically signed. Mar 13 2025  3:15AM (03-13-25 @ 00:43)  CT Abdomen and Pelvis w/ IV Cont:   ACC: [id number] EXAM:  CT CHEST IC   ORDERED BY: GOPI MANN     ACC: [id number] EXAM:  CT ABDOMEN AND PELVIS IC   ORDERED BY: GOPI MANN     PROCEDURE DATE:  03/13/2025          INTERPRETATION:  CLINICAL INFORMATION: Possible fall. Full CT trauma   imaging requested.    COMPARISON: CT abdomen pelvis 7/9/2024. No prior chest CT available for   comparison.    CONTRAST/COMPLICATIONS:  IV Contrast: Omnipaque 350 (accession [id number]), IV contrast documented   in unlinked concurrent exam (accession [id number])  95 cc administered   5   cc discarded  Oral Contrast: NONE  .    PROCEDURE:  CT of the Chest, Abdomen and Pelvis was performed.  Sagittal and coronal reformats were performed.    FINDINGS:  CHEST:  LUNGS/PLEURA/AIRWAYS: Trachea is clear. No acute airspace consolidation.   No pleural effusion or pneumothorax. Diffuse calcified pleural plaques   likely secondary to prior asbestos exposure. Dependent atelectatic change.  VESSELS: Atherosclerosis. No evidence of acute central pulmonary   embolism. Direct origin of the left vertebral artery from the aortic   arch, normal variant.  HEART: Coronary calcifications. Aortic root/valve calcifications.  MEDIASTINUM AND NINO: No lymphadenopathy.  CHEST WALL AND LOWER NECK: Mild bilateral gynecomastia.    ABDOMEN AND PELVIS:  LIVER: Right hepatic lobe subcentimeter hypodensities too small to   further characterize. Otherwise within normal limits.  BILE DUCTS: Normal caliber.  GALLBLADDER: Within normal limits.  SPLEEN: Scattered splenic calcifications.  PANCREAS: Within normal limits.  ADRENALS: Within normal limits.  KIDNEYS/URETERS: Symmetric enhancement. No hydronephrosis. Bilateral   renal cysts and subcentimeter hypodensities too small to further   characterize.    BLADDER: Within normal limits.  REPRODUCTIVE ORGANS: Prostate is enlarged.    BOWEL: No bowel obstruction. Colonic diverticulosis. Appendix is not   visualized. No evidence of inflammation in the pericecal region.  PERITONEUM/RETROPERITONEUM: Within normal limits.  VESSELS: Atherosclerotic calcifications within the aorta and its branches.  LYMPH NODES: No lymphadenopathy.  ABDOMINAL WALL: Small fat-containing umbilical hernia.  BONES: Diffuse osteopenia. Degenerative changes. Mild retrolisthesis of   L4 onL5 and L5 on S1. Chronic right posterior 11th and 12th rib   fractures. Cortical discontinuity appearance of the left L3 transverse   process could represent age indeterminate nondisplaced fracture (series 5   image 376), new appearing since 7/9/2024.    IMPRESSION:  1.  Cortical discontinuity appearance of the left L3 transverse process   could represent age indeterminate nondisplaced fracture (series 5 image   376), new appearing since 7/9/2024.  2.  Otherwise no definite evidence of acute traumatic injury to the   chest, abdomen or pelvis.      --- End of Report ---          SANDY EDWARD MD; Resident Radiologist  This document has been electronically signed.  LG BOJORQUEZ MD; Attending Radiologist  This document has been electronically signed. Mar 13 2025  3:15AM (03-13-25 @ 00:43)

## 2025-03-14 NOTE — PROGRESS NOTE ADULT - ASSESSMENT
89-year-old male with past medical history of hypertension, hyperlipidemia, dementia sent in from nursing facility for weakness. Patient being admitted for further evaluation and management.    # Unwitnessed Ground Level Mechanical Fall  # Weakness  # Constricted Pupils, Direct and Indirect Reflex +nt  - Vitals: 150/89 mm Hg, 67 bpm, 20 rpm, Afebrile  - Labs:  , Salicylate, Acetaminophen and Alcohol levels: Unremarkable  CT HEAD:  No significant interval change when compared with the prior head CT examination of 3/13/2025 at 12:21 AM.  No evidence of acute intracranial hemorrhage, acute territorial infarct, mass or midline shift.  CTA:  Moderate stenosis at the right carotid bulb.  No evidence of major vascular occlusion or aneurysm.  CT Chest Abdomen and Pelvis w/ IV Contrast  Cortical discontinuity appearance of the left L3 transverse process could represent age indeterminate nondisplaced fracture (series 5 image 376), new appearing since 7/9/2024.  Otherwise no definite evidence of acute traumatic injury to the chest, abdomen or pelvis.    Plan  - PT Evaluation    # HTN  # HLD  - Continued home Lisinopril 10mg daily  - Continued home Atrovastatin 40mg daily    # Alzheimer disease  # Dementia  - Continued home Memantine 10mg bid  - Continued home Escitalopram 10mg daily   - Continued home donepezil 23mg daily    # Increased IOP  - c/w simbrinza opthalmic suspension  - c/w Latanoprost opthalmic solution    Miscellaneous:  DVT prophylaxis: Lovenox  Bowel  - Feeds:   - Prophylaxis: None  - Regimen: None  Activity: PT Eval  MedRec: Confirmed with NH Papers   89-year-old male with past medical history of hypertension, hyperlipidemia, dementia sent in from nursing facility for weakness. Patient being admitted for further evaluation and management.    # Unwitnessed Ground Level Mechanical Fall  # Weakness  # Constricted Pupils, Direct and Indirect Reflex +nt  - Vitals: 150/89 mm Hg, 67 bpm, 20 rpm, Afebrile  - Labs:  , Salicylate, Acetaminophen and Alcohol levels: Unremarkable  CT HEAD:  No significant interval change when compared with the prior head CT examination of 3/13/2025 at 12:21 AM.  No evidence of acute intracranial hemorrhage, acute territorial infarct, mass or midline shift.  CTA:  Moderate stenosis at the right carotid bulb.  No evidence of major vascular occlusion or aneurysm.  CT Chest Abdomen and Pelvis w/ IV Contrast  Cortical discontinuity appearance of the left L3 transverse process could represent age indeterminate nondisplaced fracture (series 5 image 376), new appearing since 7/9/2024.  Otherwise no definite evidence of acute traumatic injury to the chest, abdomen or pelvis.    Plan  - PT Evaluation recommending rehab  - Constricted pupils could be secondary to dorzolamide. will stop for now and reassess  - No opioids     # HTN  # HLD  - Continued home Lisinopril 10mg daily  - Continued home Atrovastatin 40mg daily    # Alzheimer disease  # Dementia  - Continued home Memantine 10mg bid  - Continued home Escitalopram 10mg daily   - Continued home donepezil 23mg daily    # Increased IOP  - c/w simbrinza opthalmic suspension  - c/w Latanoprost opthalmic solution    Miscellaneous:  DVT prophylaxis: Lovenox  Bowel  - Feeds: soft bite sized  - Prophylaxis: None  Activity: PT Eval

## 2025-03-14 NOTE — PHYSICAL THERAPY INITIAL EVALUATION ADULT - NSACTIVITYREC_GEN_A_PT
Pt and family were educated on the benefits of use of rolling walker to improve stability and reduce risk of falls.

## 2025-03-14 NOTE — PHYSICAL THERAPY INITIAL EVALUATION ADULT - GENERAL OBSERVATIONS, REHAB EVAL
Pt was found in bed semireclined with 2 sons present sitting bedside. Pt was AOx1 (person). +IV lock.

## 2025-03-15 LAB
ANION GAP SERPL CALC-SCNC: 10 MMOL/L — SIGNIFICANT CHANGE UP (ref 7–14)
BASOPHILS # BLD AUTO: 0.06 K/UL — SIGNIFICANT CHANGE UP (ref 0–0.2)
BASOPHILS NFR BLD AUTO: 0.7 % — SIGNIFICANT CHANGE UP (ref 0–1)
BUN SERPL-MCNC: 19 MG/DL — SIGNIFICANT CHANGE UP (ref 10–20)
CALCIUM SERPL-MCNC: 8.6 MG/DL — SIGNIFICANT CHANGE UP (ref 8.4–10.5)
CHLORIDE SERPL-SCNC: 105 MMOL/L — SIGNIFICANT CHANGE UP (ref 98–110)
CO2 SERPL-SCNC: 25 MMOL/L — SIGNIFICANT CHANGE UP (ref 17–32)
CREAT SERPL-MCNC: 1 MG/DL — SIGNIFICANT CHANGE UP (ref 0.7–1.5)
EGFR: 71 ML/MIN/1.73M2 — SIGNIFICANT CHANGE UP
EGFR: 71 ML/MIN/1.73M2 — SIGNIFICANT CHANGE UP
EOSINOPHIL # BLD AUTO: 0.2 K/UL — SIGNIFICANT CHANGE UP (ref 0–0.7)
EOSINOPHIL NFR BLD AUTO: 2.5 % — SIGNIFICANT CHANGE UP (ref 0–8)
GLUCOSE SERPL-MCNC: 97 MG/DL — SIGNIFICANT CHANGE UP (ref 70–99)
HCT VFR BLD CALC: 39.4 % — LOW (ref 42–52)
HGB BLD-MCNC: 13.2 G/DL — LOW (ref 14–18)
IMM GRANULOCYTES NFR BLD AUTO: 0.6 % — HIGH (ref 0.1–0.3)
LYMPHOCYTES # BLD AUTO: 1.57 K/UL — SIGNIFICANT CHANGE UP (ref 1.2–3.4)
LYMPHOCYTES # BLD AUTO: 19.6 % — LOW (ref 20.5–51.1)
MAGNESIUM SERPL-MCNC: 2 MG/DL — SIGNIFICANT CHANGE UP (ref 1.8–2.4)
MCHC RBC-ENTMCNC: 31.7 PG — HIGH (ref 27–31)
MCHC RBC-ENTMCNC: 33.5 G/DL — SIGNIFICANT CHANGE UP (ref 32–37)
MCV RBC AUTO: 94.7 FL — HIGH (ref 80–94)
MONOCYTES # BLD AUTO: 0.72 K/UL — HIGH (ref 0.1–0.6)
MONOCYTES NFR BLD AUTO: 9 % — SIGNIFICANT CHANGE UP (ref 1.7–9.3)
NEUTROPHILS # BLD AUTO: 5.43 K/UL — SIGNIFICANT CHANGE UP (ref 1.4–6.5)
NEUTROPHILS NFR BLD AUTO: 67.6 % — SIGNIFICANT CHANGE UP (ref 42.2–75.2)
NRBC BLD AUTO-RTO: 0 /100 WBCS — SIGNIFICANT CHANGE UP (ref 0–0)
PLATELET # BLD AUTO: 226 K/UL — SIGNIFICANT CHANGE UP (ref 130–400)
PMV BLD: 10.1 FL — SIGNIFICANT CHANGE UP (ref 7.4–10.4)
POTASSIUM SERPL-MCNC: 4.4 MMOL/L — SIGNIFICANT CHANGE UP (ref 3.5–5)
RBC # BLD: 4.16 M/UL — LOW (ref 4.7–6.1)
RBC # FLD: 13.3 % — SIGNIFICANT CHANGE UP (ref 11.5–14.5)
SODIUM SERPL-SCNC: 140 MMOL/L — SIGNIFICANT CHANGE UP (ref 135–146)
WBC # BLD: 8.03 K/UL — SIGNIFICANT CHANGE UP (ref 4.8–10.8)
WBC # FLD AUTO: 8.03 K/UL — SIGNIFICANT CHANGE UP (ref 4.8–10.8)

## 2025-03-15 PROCEDURE — 99222 1ST HOSP IP/OBS MODERATE 55: CPT

## 2025-03-15 PROCEDURE — 99232 SBSQ HOSP IP/OBS MODERATE 35: CPT

## 2025-03-15 RX ORDER — DORZOLAMIDE 20 MG/ML
1 SOLUTION/ DROPS OPHTHALMIC
Refills: 0 | Status: DISCONTINUED | OUTPATIENT
Start: 2025-03-15 | End: 2025-03-17

## 2025-03-15 RX ADMIN — BRIMONIDINE TARTRATE 1 DROP(S): 1.5 SOLUTION/ DROPS OPHTHALMIC at 17:23

## 2025-03-15 RX ADMIN — LATANOPROST PF 1 DROP(S): 0.05 SOLUTION/ DROPS OPHTHALMIC at 21:06

## 2025-03-15 RX ADMIN — MEMANTINE HYDROCHLORIDE 10 MILLIGRAM(S): 21 CAPSULE, EXTENDED RELEASE ORAL at 06:07

## 2025-03-15 RX ADMIN — LISINOPRIL 10 MILLIGRAM(S): 5 TABLET ORAL at 06:07

## 2025-03-15 RX ADMIN — DONEPEZIL HYDROCHLORIDE 20 MILLIGRAM(S): 5 TABLET ORAL at 21:05

## 2025-03-15 RX ADMIN — BRIMONIDINE TARTRATE 1 DROP(S): 1.5 SOLUTION/ DROPS OPHTHALMIC at 06:08

## 2025-03-15 RX ADMIN — Medication 1 TABLET(S): at 11:14

## 2025-03-15 RX ADMIN — ENOXAPARIN SODIUM 40 MILLIGRAM(S): 100 INJECTION SUBCUTANEOUS at 21:06

## 2025-03-15 RX ADMIN — ATORVASTATIN CALCIUM 40 MILLIGRAM(S): 80 TABLET, FILM COATED ORAL at 21:05

## 2025-03-15 RX ADMIN — ESCITALOPRAM OXALATE 10 MILLIGRAM(S): 20 TABLET ORAL at 11:10

## 2025-03-15 RX ADMIN — DORZOLAMIDE 1 DROP(S): 20 SOLUTION/ DROPS OPHTHALMIC at 17:23

## 2025-03-15 NOTE — DIETITIAN INITIAL EVALUATION ADULT - OTHER INFO
# Unwitnessed Ground Level Mechanical Fall  # Weakness  # HTN  # HLD  # Alzheimer disease  # Dementia

## 2025-03-15 NOTE — DISCHARGE NOTE PROVIDER - NSDCCPCAREPLAN_GEN_ALL_CORE_FT
PRINCIPAL DISCHARGE DIAGNOSIS  Diagnosis: Weakness  Assessment and Plan of Treatment: you were brought to the hospital from your nursing facility after a fall and because you seemed weaker than usual. Your pupils also appeared smaller than normal. We performed scans of your head and body to ensure there were no serious injuries. The scans didn't show any significant new problems, although we did see a small, old fracture in your back, which likely happened some time ago and isn't causing your current symptoms. The small size of your pupils is likely due to your eye drops.  Please followup outpatient with your PCP     PRINCIPAL DISCHARGE DIAGNOSIS  Diagnosis: Weakness  Assessment and Plan of Treatment: you were brought to the hospital from your nursing facility after a fall and because you seemed weaker than usual. Your pupils also appeared smaller than normal. We performed scans of your head and body to ensure there were no serious injuries. The scans didn't show any significant new problems, although we did see a small, old fracture in your back, which likely happened some time ago and isn't causing your current symptoms. The small size of your pupils is likely due to your eye drops.  Please followup outpatient with your PCP.  Weakness  ImageWeakness is a lack of strength. You may feel weak all over your body (generalized), or you may feel weak in one specific part of your body (focal). There are many potential causes of weakness. Sometimes, the cause of your weakness may not be known. Some causes of weakness can be serious, so it is important to see your doctor.  Follow these instructions at home:  Rest as needed.  Try to get enough sleep. Talk to your doctor about how much sleep you need each night.  Take over-the-counter and prescription medicines only as told by your doctor.  Eat a healthy, well-balanced diet. This includes:  Proteins to build muscles, such as lean meats and fish.  Fresh fruits and vegetables.  Carbohydrates to boost energy, such as whole grains.  Drink enough fluid to keep your pee (urine) clear or pale yellow.  Do strength exercises, such as arm curls and leg raises, for 30 minutes at least 2 days a week or as told by your doctor.  Think about working with a physical therapist or  to help you get stronger.  Keep all follow-up visits as told by your doctor. This is important.  Contact a doctor if:  Your weakness does not get better or it gets worse.  Your weakness affects your ability to:

## 2025-03-15 NOTE — DISCHARGE NOTE PROVIDER - HOSPITAL COURSE
[Pain] : pain [Stable] : stable [___ mths] : [unfilled] month(s) ago [7] : currently ~his/her~ pain is 7 out of 10 [Intermit.] : ~He/She~ states the symptoms seem to be intermittent [Prolonged Sitting] : worsened by prolonged sitting [Heat] : relieved by heat [All Other ROS Normal] : All other review of systems are negative except as noted [Joint Pain] : joint pain [Joint Stiffness] : joint stiffness [All Hx] : past medical, family, and social [All] : medication and allergy [Incontinence] : no incontinence [Urinary Ret.] : no urinary retention [FreeTextEntry1] : lower back pain   MRI review [FreeTextEntry2] : Patient states he had no injury. His back has been hurting for 2 months. Sitting to standing is difficult causing sharp pain.  No numbness or tingling.\brandon Traveled to [place] to visit his mother, on return 2 months ago had back pain. Works as a .  89-year-old male with past medical history of hypertension, hyperlipidemia, dementia sent in from nursing facility for weakness. Patient was seen in ED earlier this morning after unwitnessed fall at facility with an unremarkable workup. Facility sent patient back to ED due to weakness and constricted pupils.  A&O x 1 at baseline. denies any other symptoms including fevers, chill, headache, recent illness/travel, cough, abdominal pain, chest pain, or SOB.    On my encounter patient is AXOx 1, comfortably lying at bed, pleasant vitally stable.     ED Course:  Vitals: 150/89 mm Hg, 67 bpm, 20 rpm, Afebrile  Labs:  , Salicylate, Acetaminophen and Alcohol levels: Unremarkable  CT HEAD:  No significant interval change when compared with the prior head CT examination of 3/13/2025 at 12:21 AM.  No evidence of acute intracranial hemorrhage, acute territorial infarct, mass or midline shift.  CTA:  Moderate stenosis at the right carotid bulb.  No evidence of major vascular occlusion or aneurysm.    Patient being admitted for further evaluation and management.     Hospital course   This 89-year-old male with a history of hypertension, hyperlipidemia, and dementia presented from his nursing facility for evaluation of weakness and constricted pupils following an unwitnessed fall. He was initially seen in the ED earlier the same day for the fall, and the workup was unremarkable at that time. Upon return to the ED, the patient was alert and oriented only to person, which is reportedly his baseline. He denied any other symptoms. His vital signs were stable. Laboratory evaluation, including salicylate, acetaminophen, and alcohol levels, was unremarkable, aside from an elevated alkaline phosphatase of 140 U/L. CT head showed no acute intracranial process and was unchanged from a prior study in March 2025. CTA head and neck revealed moderate stenosis of the right carotid bulb, without occlusion or aneurysm. CT chest, abdomen, and pelvis showed a possible age-indeterminate nondisplaced fracture of the left L3 transverse process, new since July 2024, but no other acute traumatic injury. Physical therapy evaluated the patient and recommended rehabilitation. Neurology was consulted regarding the constricted pupils; they attributed the miosis to the patient's glaucoma drops (dorzolamide), which were held and upon reassessment, pupils were reactive and less constricted the next day. dorzolamide was reinstated and patient was stable for discharge.     # Unwitnessed Ground Level Mechanical Fall  # Weakness  # Constricted Pupils, Direct and Indirect Reflex +nt  - Vitals: 150/89 mm Hg, 67 bpm, 20 rpm, Afebrile  - Labs:  , Salicylate, Acetaminophen and Alcohol levels: Unremarkable  - CT HEAD: No significant interval change when compared with the prior head CT examination of 3/13/2025 at 12:21 AM. No evidence of acute intracranial hemorrhage, acute territorial infarct, mass or midline shift.  - CTA: Moderate stenosis at the right carotid bulb. No evidence of major vascular occlusion or aneurysm. CT Chest Abdomen and Pelvis w/ IV Contrast Cortical discontinuity appearance of the left L3 transverse process could represent age indeterminate nondisplaced fracture (series 5 image 376), new appearing since 7/9/2024. Otherwise no definite evidence of acute traumatic injury to the chest, abdomen or pelvis.  - PT Evaluation recommending rehab  - Constricted pupils could be secondary to dorzolamide. will stop for now and reassess  - No opioids   - Neuro consulted, miosis likely secondary to glaucoma drops    # HTN  # HLD  - Continued home Lisinopril 10mg daily  - Continued home Atrovastatin 40mg daily    # Alzheimer disease  # Dementia  - Continued home Memantine 10mg bid  - Continued home Escitalopram 10mg daily   - Continued home donepezil 23mg daily    # Increased IOP  - c/w simbrinza opthalmic suspension  - c/w Latanoprost opthalmic solution [de-identified] : am pain 89-year-old male with past medical history of hypertension, hyperlipidemia, dementia sent in from nursing facility for weakness. Patient was seen in ED earlier this morning after unwitnessed fall at facility with an unremarkable workup. Facility sent patient back to ED due to weakness and constricted pupils.  A&O x 1 at baseline. denies any other symptoms including fevers, chill, headache, recent illness/travel, cough, abdominal pain, chest pain, or SOB.    On my encounter patient is AXOx 1, comfortably lying at bed, pleasant vitally stable.     ED Course:  Vitals: 150/89 mm Hg, 67 bpm, 20 rpm, Afebrile  Labs:  , Salicylate, Acetaminophen and Alcohol levels: Unremarkable  CT HEAD:  No significant interval change when compared with the prior head CT examination of 3/13/2025 at 12:21 AM.  No evidence of acute intracranial hemorrhage, acute territorial infarct, mass or midline shift.  CTA:  Moderate stenosis at the right carotid bulb.  No evidence of major vascular occlusion or aneurysm.    Patient being admitted for further evaluation and management.     Hospital course   This 89-year-old male with a history of hypertension, hyperlipidemia, and dementia presented from his nursing facility for evaluation of weakness and constricted pupils following an unwitnessed fall. He was initially seen in the ED earlier the same day for the fall, and the workup was unremarkable at that time. Upon return to the ED, the patient was alert and oriented only to person, which is reportedly his baseline. He denied any other symptoms. His vital signs were stable. Laboratory evaluation, including salicylate, acetaminophen, and alcohol levels, was unremarkable, aside from an elevated alkaline phosphatase of 140 U/L. CT head showed no acute intracranial process and was unchanged from a prior study in March 2025. CTA head and neck revealed moderate stenosis of the right carotid bulb, without occlusion or aneurysm. CT chest, abdomen, and pelvis showed a possible age-indeterminate nondisplaced fracture of the left L3 transverse process, new since July 2024, but no other acute traumatic injury. Physical therapy evaluated the patient and recommended rehabilitation. Neurology was consulted regarding the constricted pupils; they attributed the miosis to the patient's glaucoma drops (dorzolamide), which were held and upon reassessment, pupils were reactive and less constricted the next day. dorzolamide was reinstated and patient was stable for discharge.     # Unwitnessed Ground Level Mechanical Fall  # Weakness  # Constricted Pupils, Direct and Indirect Reflex +nt  - Vitals: 150/89 mm Hg, 67 bpm, 20 rpm, Afebrile  - Labs:  , Salicylate, Acetaminophen and Alcohol levels: Unremarkable  - CT HEAD: No significant interval change when compared with the prior head CT examination of 3/13/2025 at 12:21 AM. No evidence of acute intracranial hemorrhage, acute territorial infarct, mass or midline shift.  - CTA: Moderate stenosis at the right carotid bulb. No evidence of major vascular occlusion or aneurysm. CT Chest Abdomen and Pelvis w/ IV Contrast Cortical discontinuity appearance of the left L3 transverse process could represent age indeterminate nondisplaced fracture (series 5 image 376), new appearing since 7/9/2024. Otherwise no definite evidence of acute traumatic injury to the chest, abdomen or pelvis.  - PT Evaluation recommending rehab  - Constricted pupils could be secondary to dorzolamide. will stop for now and reassess  - No opioids   - Neuro consulted, miosis likely secondary to glaucoma drops; no acute intervention    # HTN  # HLD  - Continued home Lisinopril 10mg daily  - Continued home Atorvastatin 40mg daily    # Alzheimer disease  # Dementia  - Continued home Memantine 10mg bid  - Continued home Escitalopram 10mg daily   - Continued home donepezil 23mg daily    # Increased IOP  - c/w simbrinza opthalmic suspension  - c/w Latanoprost opthalmic solution    Pt is medically stable for discharge. Discharge discussed and approved by Dr. Velásquez.

## 2025-03-15 NOTE — DIETITIAN INITIAL EVALUATION ADULT - PERTINENT MEDS FT
MEDICATIONS  (STANDING):  atorvastatin 40 milliGRAM(s) Oral at bedtime  brimonidine 0.2% Ophthalmic Solution 1 Drop(s) Left EYE two times a day  donepezil 20 milliGRAM(s) Oral at bedtime  dorzolamide 2% Ophthalmic Solution 1 Drop(s) Left EYE <User Schedule>  enoxaparin Injectable 40 milliGRAM(s) SubCutaneous every 24 hours  escitalopram 10 milliGRAM(s) Oral daily  latanoprost 0.005% Ophthalmic Solution 1 Drop(s) Both EYES at bedtime  lisinopril 10 milliGRAM(s) Oral daily  memantine 10 milliGRAM(s) Oral two times a day  multivitamin 1 Tablet(s) Oral daily    MEDICATIONS  (PRN):

## 2025-03-15 NOTE — DISCHARGE NOTE PROVIDER - PROVIDER TOKENS
PROVIDER:[TOKEN:[[id number]],FOLLOWUP:[1-3 days]] PROVIDER:[TOKEN:[[id number]],FOLLOWUP:[2 weeks]]

## 2025-03-15 NOTE — DISCHARGE NOTE PROVIDER - NSDCMRMEDTOKEN_GEN_ALL_CORE_FT
atorvastatin 40 mg oral tablet: 1 tab(s) orally once a day (at bedtime)  donepezil 23 mg oral tablet: 1 tab(s) orally once a day  escitalopram 10 mg oral tablet: 1 tab(s) orally once a day  latanoprost 0.005% ophthalmic solution: 1 drop(s) in each eye once a day (at bedtime) 1 drop in both eyes  lisinopril 10 mg oral tablet: 1 tab(s) orally once a day  memantine 10 mg oral tablet: 1 tab(s) orally 2 times a day  Multiple Vitamins oral tablet: 1 tab(s) orally once a day  Simbrinza 1%- 0.2% ophthalmic suspension: 1 drop(s) in each affected eye 2 times a day one drop in left eye

## 2025-03-15 NOTE — DISCHARGE NOTE PROVIDER - CARE PROVIDERS DIRECT ADDRESSES
[email or web address]

## 2025-03-15 NOTE — PROGRESS NOTE ADULT - ASSESSMENT
89-year-old male with past medical history of hypertension, hyperlipidemia, dementia sent in from nursing facility for weakness. Patient being admitted for further evaluation and management.    # Unwitnessed Ground Level Mechanical Fall  # Weakness  # Constricted Pupils, Direct and Indirect Reflex +nt  - Vitals: 150/89 mm Hg, 67 bpm, 20 rpm, Afebrile  - Labs:  , Salicylate, Acetaminophen and Alcohol levels: Unremarkable  - CT HEAD: No significant interval change when compared with the prior head CT examination of 3/13/2025 at 12:21 AM. No evidence of acute intracranial hemorrhage, acute territorial infarct, mass or midline shift.  - CTA: Moderate stenosis at the right carotid bulb. No evidence of major vascular occlusion or aneurysm. CT Chest Abdomen and Pelvis w/ IV Contrast Cortical discontinuity appearance of the left L3 transverse process could represent age indeterminate nondisplaced fracture (series 5 image 376), new appearing since 7/9/2024. Otherwise no definite evidence of acute traumatic injury to the chest, abdomen or pelvis.  - PT Evaluation recommending rehab  - Constricted pupils could be secondary to dorzolamide. will stop for now and reassess  - No opioids   - Neuro: pupils likely related to medications given for his glaucoma. no further work up required    # HTN  # HLD  - Continued home Lisinopril 10mg daily  - Continued home Atrovastatin 40mg daily    # Alzheimer disease  # Dementia  - Continued home Memantine 10mg bid  - Continued home Escitalopram 10mg daily   - Continued home donepezil 23mg daily    # Increased IOP  - c/w simbrinza opthalmic suspension  - c/w Latanoprost opthalmic solution    Miscellaneous:  DVT prophylaxis: Lovenox  Bowel  - Feeds: soft bite sized  - Prophylaxis: None  Activity: PT Eval    #Progress Note Handoff  Pending (specify): dispo planning.   Family discussion: updated at bedside  Disposition: return back to [facility]/OT on Monday

## 2025-03-15 NOTE — DIETITIAN INITIAL EVALUATION ADULT - NAME AND PHONE
Sameera Benitez x [id number] or [employer]   moderate risk follow up      Monitoring/Evaluating: PO intake, Labs, Lytes, Wts,  Diet and texture tolerance, NFPF, GI S/S, BM.

## 2025-03-15 NOTE — DIETITIAN INITIAL EVALUATION ADULT - OTHER CALCULATIONS
Estimated energy and protein needs with consideration for weight maintenanceBMI, age, mobility,  and comorbidities.

## 2025-03-15 NOTE — DIETITIAN INITIAL EVALUATION ADULT - PERTINENT LABORATORY DATA
03-15    140  |  105  |  19  ----------------------------<  97  4.4   |  25  |  1.0    Ca    8.6      15 Mar 2025 07:17  Mg     2.0     03-15    TPro  6.5  /  Alb  3.8  /  TBili  0.5  /  DBili  x   /  AST  23  /  ALT  15  /  AlkPhos  138[H]  03-14

## 2025-03-15 NOTE — DIETITIAN INITIAL EVALUATION ADULT - ADD RECOMMEND
-c/w current diet order, defer to slp recommendations for diet texture  - encourage PO intake w/ 1:1 assisstance

## 2025-03-15 NOTE — DISCHARGE NOTE PROVIDER - CARE PROVIDER_API CALL
Juan Carreno  Internal Medicine  [address] [facility], [facility] [address]  [place] [address]  Phone: [phone]  Fax: [phone]  Follow Up Time: 1-3 days   Juan Carreno  Internal Medicine  [address] [facility] [facility] [address]  [place] [address]  Phone: [phone]  Fax: [phone]  Follow Up Time: 2 weeks

## 2025-03-15 NOTE — DIETITIAN INITIAL EVALUATION ADULT - ORAL INTAKE PTA/DIET HISTORY
Pt w/ dementia. no family at bedside. No hx of heights in EMR. Unsure of UBW. Currently eating 25-50 % of meals and Oral nutrition supplements per flowsheet.

## 2025-03-16 LAB
ANION GAP SERPL CALC-SCNC: 10 MMOL/L — SIGNIFICANT CHANGE UP (ref 7–14)
BASOPHILS # BLD AUTO: 0.06 K/UL — SIGNIFICANT CHANGE UP (ref 0–0.2)
BASOPHILS NFR BLD AUTO: 0.6 % — SIGNIFICANT CHANGE UP (ref 0–1)
BUN SERPL-MCNC: 20 MG/DL — SIGNIFICANT CHANGE UP (ref 10–20)
CALCIUM SERPL-MCNC: 8.9 MG/DL — SIGNIFICANT CHANGE UP (ref 8.4–10.5)
CHLORIDE SERPL-SCNC: 103 MMOL/L — SIGNIFICANT CHANGE UP (ref 98–110)
CO2 SERPL-SCNC: 26 MMOL/L — SIGNIFICANT CHANGE UP (ref 17–32)
CREAT SERPL-MCNC: 0.8 MG/DL — SIGNIFICANT CHANGE UP (ref 0.7–1.5)
EGFR: 84 ML/MIN/1.73M2 — SIGNIFICANT CHANGE UP
EGFR: 84 ML/MIN/1.73M2 — SIGNIFICANT CHANGE UP
EOSINOPHIL # BLD AUTO: 0.26 K/UL — SIGNIFICANT CHANGE UP (ref 0–0.7)
EOSINOPHIL NFR BLD AUTO: 2.6 % — SIGNIFICANT CHANGE UP (ref 0–8)
GLUCOSE SERPL-MCNC: 92 MG/DL — SIGNIFICANT CHANGE UP (ref 70–99)
HCT VFR BLD CALC: 42.5 % — SIGNIFICANT CHANGE UP (ref 42–52)
HGB BLD-MCNC: 13.7 G/DL — LOW (ref 14–18)
IMM GRANULOCYTES NFR BLD AUTO: 0.5 % — HIGH (ref 0.1–0.3)
LYMPHOCYTES # BLD AUTO: 1.55 K/UL — SIGNIFICANT CHANGE UP (ref 1.2–3.4)
LYMPHOCYTES # BLD AUTO: 15.7 % — LOW (ref 20.5–51.1)
MCHC RBC-ENTMCNC: 31 PG — SIGNIFICANT CHANGE UP (ref 27–31)
MCHC RBC-ENTMCNC: 32.2 G/DL — SIGNIFICANT CHANGE UP (ref 32–37)
MCV RBC AUTO: 96.2 FL — HIGH (ref 80–94)
MONOCYTES # BLD AUTO: 0.85 K/UL — HIGH (ref 0.1–0.6)
MONOCYTES NFR BLD AUTO: 8.6 % — SIGNIFICANT CHANGE UP (ref 1.7–9.3)
NEUTROPHILS # BLD AUTO: 7.09 K/UL — HIGH (ref 1.4–6.5)
NEUTROPHILS NFR BLD AUTO: 72 % — SIGNIFICANT CHANGE UP (ref 42.2–75.2)
NRBC BLD AUTO-RTO: 0 /100 WBCS — SIGNIFICANT CHANGE UP (ref 0–0)
PLATELET # BLD AUTO: 222 K/UL — SIGNIFICANT CHANGE UP (ref 130–400)
PMV BLD: 10.2 FL — SIGNIFICANT CHANGE UP (ref 7.4–10.4)
POTASSIUM SERPL-MCNC: 4.2 MMOL/L — SIGNIFICANT CHANGE UP (ref 3.5–5)
POTASSIUM SERPL-SCNC: 4.2 MMOL/L — SIGNIFICANT CHANGE UP (ref 3.5–5)
RBC # BLD: 4.42 M/UL — LOW (ref 4.7–6.1)
RBC # FLD: 13.2 % — SIGNIFICANT CHANGE UP (ref 11.5–14.5)
SODIUM SERPL-SCNC: 139 MMOL/L — SIGNIFICANT CHANGE UP (ref 135–146)
WBC # BLD: 9.86 K/UL — SIGNIFICANT CHANGE UP (ref 4.8–10.8)
WBC # FLD AUTO: 9.86 K/UL — SIGNIFICANT CHANGE UP (ref 4.8–10.8)

## 2025-03-16 PROCEDURE — 99232 SBSQ HOSP IP/OBS MODERATE 35: CPT

## 2025-03-16 RX ADMIN — Medication 1 TABLET(S): at 12:53

## 2025-03-16 RX ADMIN — LISINOPRIL 10 MILLIGRAM(S): 5 TABLET ORAL at 05:40

## 2025-03-16 RX ADMIN — MEMANTINE HYDROCHLORIDE 10 MILLIGRAM(S): 21 CAPSULE, EXTENDED RELEASE ORAL at 05:40

## 2025-03-16 RX ADMIN — BRIMONIDINE TARTRATE 1 DROP(S): 1.5 SOLUTION/ DROPS OPHTHALMIC at 17:24

## 2025-03-16 RX ADMIN — BRIMONIDINE TARTRATE 1 DROP(S): 1.5 SOLUTION/ DROPS OPHTHALMIC at 05:42

## 2025-03-16 RX ADMIN — ESCITALOPRAM OXALATE 10 MILLIGRAM(S): 20 TABLET ORAL at 12:53

## 2025-03-16 RX ADMIN — DONEPEZIL HYDROCHLORIDE 20 MILLIGRAM(S): 5 TABLET ORAL at 21:06

## 2025-03-16 RX ADMIN — ATORVASTATIN CALCIUM 40 MILLIGRAM(S): 80 TABLET, FILM COATED ORAL at 21:06

## 2025-03-16 RX ADMIN — LATANOPROST PF 1 DROP(S): 0.05 SOLUTION/ DROPS OPHTHALMIC at 21:06

## 2025-03-16 RX ADMIN — ENOXAPARIN SODIUM 40 MILLIGRAM(S): 100 INJECTION SUBCUTANEOUS at 21:06

## 2025-03-16 RX ADMIN — MEMANTINE HYDROCHLORIDE 10 MILLIGRAM(S): 21 CAPSULE, EXTENDED RELEASE ORAL at 17:24

## 2025-03-16 RX ADMIN — DORZOLAMIDE 1 DROP(S): 20 SOLUTION/ DROPS OPHTHALMIC at 05:42

## 2025-03-16 RX ADMIN — DORZOLAMIDE 1 DROP(S): 20 SOLUTION/ DROPS OPHTHALMIC at 17:24

## 2025-03-16 NOTE — PROGRESS NOTE ADULT - ASSESSMENT
89-year-old male with past medical history of hypertension, hyperlipidemia, dementia sent in from nursing facility for weakness. Patient being admitted for further evaluation and management.    # Unwitnessed Ground Level Mechanical Fall  # Weakness  # Constricted Pupils, Direct and Indirect Reflex +nt  - Vitals: 150/89 mm Hg, 67 bpm, 20 rpm, Afebrile  - Labs:  , Salicylate, Acetaminophen and Alcohol levels: Unremarkable  - CT HEAD: No significant interval change when compared with the prior head CT examination of 3/13/2025 at 12:21 AM. No evidence of acute intracranial hemorrhage, acute territorial infarct, mass or midline shift.  - CTA: Moderate stenosis at the right carotid bulb. No evidence of major vascular occlusion or aneurysm. CT Chest Abdomen and Pelvis w/ IV Contrast Cortical discontinuity appearance of the left L3 transverse process could represent age indeterminate nondisplaced fracture (series 5 image 376), new appearing since 7/9/2024. Otherwise no definite evidence of acute traumatic injury to the chest, abdomen or pelvis.  - PT Evaluation recommending rehab  - Constricted pupils could be secondary to dorzolamide. will stop for now and reassess  - No opioids   - Neuro: pupils likely related to medications given for his glaucoma. no further work up required    # HTN  # HLD  - Continued home Lisinopril 10mg daily  - Continued home Atrovastatin 40mg daily    # Alzheimer disease  # Dementia  - Continued home Memantine 10mg bid  - Continued home Escitalopram 10mg daily   - Continued home donepezil 23mg daily    # Increased IOP  - c/w simbrinza opthalmic suspension  - c/w Latanoprost opthalmic solution    Miscellaneous:  DVT prophylaxis: Lovenox  Bowel  - Feeds: soft bite sized  - Prophylaxis: None  Activity: PT Eval    #Progress Note Handoff  Pending (specify): dispo planning.   Family discussion: updated at bedside  Disposition: return back to [facility] on Monday

## 2025-03-16 NOTE — PROGRESS NOTE ADULT - SUBJECTIVE AND OBJECTIVE BOX
MICHELLE SALINAS  89y  Male      Patient is a 89y old  Male who presents with a chief complaint of Generalized weakness and miosis (15 Mar 2025 13:28)      INTERVAL HPI/OVERNIGHT EVENTS: no acute events overnight. good po intake. worked with PT today         T(C): 36.8 (03-16-25 @ 06:04), Max: 36.8 (03-16-25 @ 06:04)  HR: 63 (03-16-25 @ 06:04) (63 - 65)  BP: 151/73 (03-16-25 @ 06:04) (133/76 - 151/73)  RR: 18 (03-16-25 @ 06:04) (18 - 18)  SpO2: 97% (03-16-25 @ 06:04) (97% - 97%)  Wt(kg): --Vital Signs Last 24 Hrs  T(C): 36.8 (16 Mar 2025 06:04), Max: 36.8 (16 Mar 2025 06:04)  T(F): 98.3 (16 Mar 2025 06:04), Max: 98.3 (16 Mar 2025 06:04)  HR: 63 (16 Mar 2025 06:04) (63 - 65)  BP: 151/73 (16 Mar 2025 06:04) (133/76 - 151/73)  BP(mean): 99 (16 Mar 2025 06:04) (95 - 99)  RR: 18 (16 Mar 2025 06:04) (18 - 18)  SpO2: 97% (16 Mar 2025 06:04) (97% - 97%)    Parameters below as of 16 Mar 2025 06:04  Patient On (Oxygen Delivery Method): room air            PHYSICAL EXAM:  GENERAL: elderly M, frail appearing, NAD  HEAD: ecchymosis on right temporal  EYES: constricted symmetrically but more responsive to light then yesterdays exam  PSYCH: no agitation, baseline mentation  NERVOUS SYSTEM:  Alert & Oriented to self and place with some prompting  PULMONARY: symmetrical chest rise, no accessory muscle use  GI:Nondistended  EXTREMITIES: No clubbing, cyanosis, or edema  SKIN: No rashes or lesions    Consultant(s) Notes Reviewed:  [x ] YES  [ ] NO    Discussed with Consultants/Other Providers [ x] YES     LABS                          13.7   9.86  )-----------( 222      ( 16 Mar 2025 06:37 )             42.5     03-16    139  |  103  |  20  ----------------------------<  92  4.2   |  26  |  0.8    Ca    8.9      16 Mar 2025 06:37  Mg     1.9     03-16        Urinalysis Basic - ( 16 Mar 2025 06:37 )    Color: x / Appearance: x / SG: x / pH: x  Gluc: 92 mg/dL / Ketone: x  / Bili: x / Urobili: x   Blood: x / Protein: x / Nitrite: x   Leuk Esterase: x / RBC: x / WBC x   Sq Epi: x / Non Sq Epi: x / Bacteria: x    Lactate Trend  03-13 @ 13:12 Lactate:0.8   03-13 @ 00:10 Lactate:2.5       RADIOLOGY & ADDITIONAL TESTS:  - no images 3/16  Imaging Personally Reviewed:  [ ] YES  [ ] NO    HEALTH ISSUES - PROBLEM Dx:      MEDICATIONS  (STANDING):  atorvastatin 40 milliGRAM(s) Oral at bedtime  brimonidine 0.2% Ophthalmic Solution 1 Drop(s) Left EYE two times a day  donepezil 20 milliGRAM(s) Oral at bedtime  dorzolamide 2% Ophthalmic Solution 1 Drop(s) Left EYE <User Schedule>  enoxaparin Injectable 40 milliGRAM(s) SubCutaneous every 24 hours  escitalopram 10 milliGRAM(s) Oral daily  latanoprost 0.005% Ophthalmic Solution 1 Drop(s) Both EYES at bedtime  lisinopril 10 milliGRAM(s) Oral daily  memantine 10 milliGRAM(s) Oral two times a day  multivitamin 1 Tablet(s) Oral daily    MEDICATIONS  (PRN):

## 2025-03-17 VITALS
HEART RATE: 68 BPM | DIASTOLIC BLOOD PRESSURE: 76 MMHG | TEMPERATURE: 98 F | SYSTOLIC BLOOD PRESSURE: 127 MMHG | RESPIRATION RATE: 18 BRPM | OXYGEN SATURATION: 96 %

## 2025-03-17 LAB
ANION GAP SERPL CALC-SCNC: 10 MMOL/L — SIGNIFICANT CHANGE UP (ref 7–14)
BASOPHILS # BLD AUTO: 0.04 K/UL — SIGNIFICANT CHANGE UP (ref 0–0.2)
BASOPHILS NFR BLD AUTO: 0.5 % — SIGNIFICANT CHANGE UP (ref 0–1)
BUN SERPL-MCNC: 18 MG/DL — SIGNIFICANT CHANGE UP (ref 10–20)
CALCIUM SERPL-MCNC: 9.1 MG/DL — SIGNIFICANT CHANGE UP (ref 8.4–10.5)
CHLORIDE SERPL-SCNC: 101 MMOL/L — SIGNIFICANT CHANGE UP (ref 98–110)
CO2 SERPL-SCNC: 27 MMOL/L — SIGNIFICANT CHANGE UP (ref 17–32)
CREAT SERPL-MCNC: 1 MG/DL — SIGNIFICANT CHANGE UP (ref 0.7–1.5)
EGFR: 71 ML/MIN/1.73M2 — SIGNIFICANT CHANGE UP
EGFR: 71 ML/MIN/1.73M2 — SIGNIFICANT CHANGE UP
EOSINOPHIL # BLD AUTO: 0.28 K/UL — SIGNIFICANT CHANGE UP (ref 0–0.7)
EOSINOPHIL NFR BLD AUTO: 3.2 % — SIGNIFICANT CHANGE UP (ref 0–8)
GLUCOSE SERPL-MCNC: 90 MG/DL — SIGNIFICANT CHANGE UP (ref 70–99)
HCT VFR BLD CALC: 43.4 % — SIGNIFICANT CHANGE UP (ref 42–52)
HGB BLD-MCNC: 14 G/DL — SIGNIFICANT CHANGE UP (ref 14–18)
IMM GRANULOCYTES NFR BLD AUTO: 0.8 % — HIGH (ref 0.1–0.3)
LYMPHOCYTES # BLD AUTO: 1.53 K/UL — SIGNIFICANT CHANGE UP (ref 1.2–3.4)
LYMPHOCYTES # BLD AUTO: 17.3 % — LOW (ref 20.5–51.1)
MAGNESIUM SERPL-MCNC: 2.3 MG/DL — SIGNIFICANT CHANGE UP (ref 1.8–2.4)
MCHC RBC-ENTMCNC: 30.7 PG — SIGNIFICANT CHANGE UP (ref 27–31)
MCHC RBC-ENTMCNC: 32.3 G/DL — SIGNIFICANT CHANGE UP (ref 32–37)
MCV RBC AUTO: 95.2 FL — HIGH (ref 80–94)
MONOCYTES # BLD AUTO: 0.9 K/UL — HIGH (ref 0.1–0.6)
MONOCYTES NFR BLD AUTO: 10.2 % — HIGH (ref 1.7–9.3)
NEUTROPHILS # BLD AUTO: 6.04 K/UL — SIGNIFICANT CHANGE UP (ref 1.4–6.5)
NEUTROPHILS NFR BLD AUTO: 68 % — SIGNIFICANT CHANGE UP (ref 42.2–75.2)
NRBC BLD AUTO-RTO: 0 /100 WBCS — SIGNIFICANT CHANGE UP (ref 0–0)
PLATELET # BLD AUTO: 245 K/UL — SIGNIFICANT CHANGE UP (ref 130–400)
PMV BLD: 10.8 FL — HIGH (ref 7.4–10.4)
POTASSIUM SERPL-MCNC: 4.4 MMOL/L — SIGNIFICANT CHANGE UP (ref 3.5–5)
POTASSIUM SERPL-SCNC: 4.4 MMOL/L — SIGNIFICANT CHANGE UP (ref 3.5–5)
RBC # BLD: 4.56 M/UL — LOW (ref 4.7–6.1)
SODIUM SERPL-SCNC: 138 MMOL/L — SIGNIFICANT CHANGE UP (ref 135–146)
WBC # BLD: 8.86 K/UL — SIGNIFICANT CHANGE UP (ref 4.8–10.8)

## 2025-03-17 PROCEDURE — 99239 HOSP IP/OBS DSCHRG MGMT >30: CPT

## 2025-03-17 RX ADMIN — LISINOPRIL 10 MILLIGRAM(S): 5 TABLET ORAL at 06:29

## 2025-03-17 RX ADMIN — BRIMONIDINE TARTRATE 1 DROP(S): 1.5 SOLUTION/ DROPS OPHTHALMIC at 06:30

## 2025-03-17 RX ADMIN — Medication 1 TABLET(S): at 11:29

## 2025-03-17 RX ADMIN — ESCITALOPRAM OXALATE 10 MILLIGRAM(S): 20 TABLET ORAL at 11:29

## 2025-03-17 RX ADMIN — MEMANTINE HYDROCHLORIDE 10 MILLIGRAM(S): 21 CAPSULE, EXTENDED RELEASE ORAL at 06:29

## 2025-03-17 RX ADMIN — DORZOLAMIDE 1 DROP(S): 20 SOLUTION/ DROPS OPHTHALMIC at 06:30

## 2025-03-17 NOTE — PROGRESS NOTE ADULT - ASSESSMENT
89 y/o man with PMH of HTN, hyperlipidemia and dementia was sent in from [place] assisted living for weakness and s/p recent fall.    1. Unwitnessed Ground Level Mechanical Fall  overall Weakness  miotic pupils reactive to light - likely due to med effect per neurology (on meds for glaucoma)  s/p trauma workup  CT HEAD: No significant interval change when compared with the prior head CT examination of 3/13/2025 at 12:21 AM. No evidence of acute intracranial hemorrhage, acute territorial infarct, mass or midline shift.  CTA: Moderate stenosis at the right carotid bulb. No evidence of major vascular occlusion or aneurysm. CT Chest Abdomen and Pelvis w/ IV Contrast Cortical discontinuity appearance of the left L3 transverse process could represent age indeterminate nondisplaced fracture (series 5 image 376), new appearing since 7/9/2024. Otherwise no definite evidence of acute traumatic injury to the chest, abdomen or pelvis.  pt denies pain at this time  fall precautions  negative orthostatics  no further workup per neurology  evaluated by PT - he ambulated 8ft with RW -> STR recommended and family in agreement per case management - discharge planning to [facility] in progress    2. Continue current management for HTN, hyperlipidemia, glaucoma, dementia (reorient frequently) and DVT prophylaxis    full code    Progress Note Handoff  Pending (specify): discharge planning to STR  Family discussion: updated by case management  Disposition: STR at [facility]      89 y/o man with PMH of HTN, hyperlipidemia and dementia was sent in from [place] assisted living for weakness and s/p recent fall.    1. Unwitnessed Ground Level Mechanical Fall  overall Weakness  miotic pupils reactive to light - likely due to med effect per neurology (on meds for glaucoma)  s/p trauma workup  CT HEAD: No significant interval change when compared with the prior head CT examination of 3/13/2025 at 12:21 AM. No evidence of acute intracranial hemorrhage, acute territorial infarct, mass or midline shift.  CTA: Moderate stenosis at the right carotid bulb. No evidence of major vascular occlusion or aneurysm. CT Chest Abdomen and Pelvis w/ IV Contrast Cortical discontinuity appearance of the left L3 transverse process could represent age indeterminate nondisplaced fracture (series 5 image 376), new appearing since 7/9/2024. Otherwise no definite evidence of acute traumatic injury to the chest, abdomen or pelvis.  pt denies pain at this time  fall precautions  negative orthostatics  no further workup per neurology  evaluated by PT - he ambulated 8ft with RW -> STR recommended and family in agreement per case management - discharge planning to [facility] in progress    2. Continue current management for HTN, hyperlipidemia, glaucoma, dementia (reorient frequently) and DVT prophylaxis    full code    Progress Note Handoff  Pending (specify): discharge planning to STR  Family discussion: updated by case management  Disposition: STR at [facility]       addendum  pt has bed at [facility] today  med rec and discharge papers reviewed by me - spent 35 minutes on the discharge process including chart review

## 2025-03-17 NOTE — PROGRESS NOTE ADULT - REASON FOR ADMISSION
Generalized weakness and miosis

## 2025-03-17 NOTE — CDI QUERY NOTE - NSCDIOTHERTXTBX_GEN_ALL_CORE_HH
Clinical documentation and/or evidence of the patient’s presentation, evaluation, and medical management, as evidenced below, may support a diagnosis that is not documented in the medical record.  In order to ensure accurate coding and accuracy of the clinical record, the documentation in this patient’s medical record requires additional clarification.  If you think the supporting documentation and/or clinical evidence supports a more specific diagnosis, please include more specific documentation of a diagnosis associated with these findings in your progress note and/or discharge summary.    Please clarify if the patient was found to have one of the following:    	  •Weakness associated with age related/senile debility and Alzheimer's dementia.  •Weakness associated with age related/senile debility.  •Weakness associated with Alzheimer's dementia.  •Other (specify)    Supporting documentation and/or clinical evidence:     PT assessment-· PT Diagnosis: Rehab of deconditioning; falls; Alzheimers and dementia. As per pt sons--> Prior to admission, pt was living in assisted living at [place]. Pt was ambulatory with no use of Assistive Device in the home. However, sons reports that they have noticed unsteadiness on feet. · Rehab Potential- good, to achieve stated therapy goals; · Therapy Frequency- 3-5x/week; · Predicted Duration of Therapy Intervention (days/wks)- 7 days; · Physical Therapy Discharge Recommendations- return to [facility] with PT/OT services    OT assessment- · OT Diagnosis: Debilitation; Facility sent patient back to ED due to weakness and constricted pupils.  A&O x 1 at baseline. · Bed Mobility/Transfers- needed assist, · Bathing- needs device, · Upper Body Dressing- independent, · Lower Body Dressing- independent  · Grooming- independent, · Toileting- independent, · Eating- independent, · Home Management Skills- needed assist; · Additional Comments- Pt is a poor historian, collateral information acquired from son present at bedside. Pt resides in [facility] assisted living, + stall shower with seat and grab bar. Per son pt did not require assistance with ADL, Clinical Impression: · Rehab Potential fair, will monitor progress closely, · Therapy Frequency- 2-3x/week, · Occupational Therapy Discharge Recommendations- rehab facility    Case mgmt note 3/17-CM contacted [facility], who informed that they willing to accept pt today and have available bed.    Discharge note provider 3/17- 89-year-old male... dementia sent in from nursing facility for weakness. # Alzheimer disease... PRINCIPAL DISCHARGE DIAGNOSIS: Weakness. Assessment and Plan of Treatment: you were brought to the hospital from your nursing facility after a fall and because you seemed weaker than usual.     - Continued home Memantine 10mg bid  - Continued home Escitalopram 10mg daily   - Continued home donepezil 23mg daily    Thank you,  Amber Roth, RN  [phone]

## 2025-03-17 NOTE — PROGRESS NOTE ADULT - SUBJECTIVE AND OBJECTIVE BOX
MICHELLE SALINAS  89y Male    INTERVAL HPI/OVERNIGHT EVENTS:    appears comfortable in bed  no complaints of pain  no fever    T(F): 97.8 (03-17-25 @ 12:01), Max: 98.1 (03-17-25 @ 06:02)  HR: 65 (03-17-25 @ 12:01) (55 - 65)  BP: 122/78 (03-17-25 @ 12:01) (122/78 - 149/75)  RR: 18 (03-17-25 @ 12:01) (18 - 18)  SpO2: 96% (03-17-25 @ 12:01) (96% - 97%) on RA      PHYSICAL EXAM:  GENERAL: NAD  HEAD:  Normocephalic  EYES:  conjunctiva and sclera clear, miotic pupils - reactive to light  ENMT: Moist mucous membranes  NERVOUS SYSTEM:  Alert, awake, Good concentration, not oriented to place, responds slowly  CHEST/LUNG: CTA b/l  HEART: Regular rate and rhythm  ABDOMEN: Soft, Nontender, Nondistended  EXTREMITIES:   No edema LE  SKIN: warm, dry    Consultant(s) Notes Reviewed:  [x ] YES  [ ] NO  Care Discussed with Consultants/Other Providers [ x] YES  [ ] NO    MEDICATIONS  (STANDING):  atorvastatin 40 milliGRAM(s) Oral at bedtime  brimonidine 0.2% Ophthalmic Solution 1 Drop(s) Left EYE two times a day  donepezil 20 milliGRAM(s) Oral at bedtime  dorzolamide 2% Ophthalmic Solution 1 Drop(s) Left EYE <User Schedule>  enoxaparin Injectable 40 milliGRAM(s) SubCutaneous every 24 hours  escitalopram 10 milliGRAM(s) Oral daily  latanoprost 0.005% Ophthalmic Solution 1 Drop(s) Both EYES at bedtime  lisinopril 10 milliGRAM(s) Oral daily  memantine 10 milliGRAM(s) Oral two times a day  multivitamin 1 Tablet(s) Oral daily    MEDICATIONS  (PRN):      LABS:                        14.0   8.86  )-----------( 245      ( 17 Mar 2025 07:23 )             43.4     03-17    138  |  101  |  18  ----------------------------<  90  4.4   |  27  |  1.0    Ca    9.1      17 Mar 2025 07:23  Mg     2.3     03-17                  RADIOLOGY & ADDITIONAL TESTS:    Imaging or report Personally Reviewed:  [x ] YES  [ ] NO    < from: CT Angio Neck w/ IV Cont (03.13.25 @ 16:14) >  IMPRESSION:    CT HEAD:  No significant interval change when compared with the prior head CT   examination of 3/13/2025 at 12:21 AM.  No evidence of acute intracranial hemorrhage, acute territorial infarct,   mass or midline shift.      CTA:  Moderate stenosis at the right carotid bulb.  No evidence of major vascular occlusion or aneurysm.    < end of copied text >        < from: Xray Chest 1 View- PORTABLE-Urgent (Xray Chest 1 View- PORTABLE-Urgent .) (03.13.25 @ 12:49) >  FINDINGS/  IMPRESSION:    Support devices: None.    Cardiac/mediastinum/hilum: Rounded radiopaque density overlying the lower   chest, may be external to the patient.    Lung parenchyma/Pleura: Unchanged bilateral calcified pleural plaques. No   pleural effusions or pneumothorax.    Skeleton/soft tissues: Stable.      < end of copied text >        < from: CT Abdomen and Pelvis w/ IV Cont (03.13.25 @ 00:43) >  IMPRESSION:  1.  Cortical discontinuity appearance of the left L3 transverse process   could represent age indeterminate nondisplaced fracture (series 5 image   376), new appearing since 7/9/2024.  2.  Otherwise no definite evidence of acute traumatic injury to the   chest, abdomen or pelvis.    < end of copied text >          < from: CT Cervical Spine No Cont (03.13.25 @ 00:38) >    IMPRESSION:    CT HEAD:  1.  No evidence of acute intracranial pathology.    CT CERVICAL SPINE:  1.  No evidence of acute cervical spine traumatic injury.      < end of copied text >              Case discussed with residents and RN on rounds today

## 2025-03-17 NOTE — DISCHARGE NOTE NURSING/CASE MANAGEMENT/SOCIAL WORK - PATIENT PORTAL LINK FT
You can access the FollowMyHealth Patient Portal offered by [facility] by registering at the following website: [email or web address]. By joining [facility]’s FollowMyHealth portal, you will also be able to view your health information using other applications (apps) compatible with our system.

## 2025-03-17 NOTE — DISCHARGE NOTE NURSING/CASE MANAGEMENT/SOCIAL WORK - FINANCIAL ASSISTANCE
[facility] provides services at a reduced cost to those who are determined to be eligible through [facility]’s financial assistance program. Information regarding [facility]’s financial assistance program can be found by going to [email or web address] or by calling [phone].

## 2025-03-21 DIAGNOSIS — T49.5X5A ADVERSE EFFECT OF OPHTHALMOLOGICAL DRUGS AND PREPARATIONS, INITIAL ENCOUNTER: ICD-10-CM

## 2025-03-21 DIAGNOSIS — I50.9 HEART FAILURE, UNSPECIFIED: ICD-10-CM

## 2025-03-21 DIAGNOSIS — R54 AGE-RELATED PHYSICAL DEBILITY: ICD-10-CM

## 2025-03-21 DIAGNOSIS — I11.0 HYPERTENSIVE HEART DISEASE WITH HEART FAILURE: ICD-10-CM

## 2025-03-21 DIAGNOSIS — G30.9 ALZHEIMER'S DISEASE, UNSPECIFIED: ICD-10-CM

## 2025-03-21 DIAGNOSIS — E78.5 HYPERLIPIDEMIA, UNSPECIFIED: ICD-10-CM

## 2025-03-21 DIAGNOSIS — F02.80 DEMENTIA IN OTHER DISEASES CLASSIFIED ELSEWHERE, UNSPECIFIED SEVERITY, WITHOUT BEHAVIORAL DISTURBANCE, PSYCHOTIC DISTURBANCE, MOOD DISTURBANCE, AND ANXIETY: ICD-10-CM

## 2025-04-03 ENCOUNTER — EMERGENCY (EMERGENCY)
Facility: HOSPITAL | Age: 89
LOS: 0 days | Discharge: HOME | End: 2025-04-04
Attending: EMERGENCY MEDICINE
Payer: MEDICARE

## 2025-04-03 ENCOUNTER — EMERGENCY (EMERGENCY)
Facility: HOSPITAL | Age: 89
LOS: 0 days | Discharge: HOME | End: 2025-04-03
Attending: EMERGENCY MEDICINE
Payer: MEDICARE

## 2025-04-03 VITALS
RESPIRATION RATE: 18 BRPM | SYSTOLIC BLOOD PRESSURE: 155 MMHG | OXYGEN SATURATION: 97 % | TEMPERATURE: 98 F | HEART RATE: 102 BPM | WEIGHT: 162.48 LBS | DIASTOLIC BLOOD PRESSURE: 72 MMHG

## 2025-04-03 VITALS
HEART RATE: 60 BPM | OXYGEN SATURATION: 97 % | RESPIRATION RATE: 18 BRPM | SYSTOLIC BLOOD PRESSURE: 152 MMHG | TEMPERATURE: 98 F | DIASTOLIC BLOOD PRESSURE: 76 MMHG

## 2025-04-03 DIAGNOSIS — E78.5 HYPERLIPIDEMIA, UNSPECIFIED: ICD-10-CM

## 2025-04-03 DIAGNOSIS — Z01.89 ENCOUNTER FOR OTHER SPECIFIED SPECIAL EXAMINATIONS: ICD-10-CM

## 2025-04-03 DIAGNOSIS — I10 ESSENTIAL (PRIMARY) HYPERTENSION: ICD-10-CM
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
## 2025-04-03 DIAGNOSIS — S00.01XA ABRASION OF SCALP, INITIAL ENCOUNTER: ICD-10-CM
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
## 2025-04-03 DIAGNOSIS — S00.93XA CONTUSION OF UNSPECIFIED PART OF HEAD, INITIAL ENCOUNTER: ICD-10-CM

## 2025-04-03 LAB
ALBUMIN SERPL ELPH-MCNC: 4 G/DL — SIGNIFICANT CHANGE UP (ref 3.5–5.2)
ALP SERPL-CCNC: 238 U/L — HIGH (ref 30–115)
ALT FLD-CCNC: 24 U/L — SIGNIFICANT CHANGE UP (ref 0–41)
ANION GAP SERPL CALC-SCNC: 9 MMOL/L — SIGNIFICANT CHANGE UP (ref 7–14)
AST SERPL-CCNC: 30 U/L — SIGNIFICANT CHANGE UP (ref 0–41)
BASOPHILS # BLD AUTO: 0.02 K/UL — SIGNIFICANT CHANGE UP (ref 0–0.2)
BASOPHILS NFR BLD AUTO: 0.3 % — SIGNIFICANT CHANGE UP (ref 0–1)
BUN SERPL-MCNC: 19 MG/DL — SIGNIFICANT CHANGE UP (ref 10–20)
CALCIUM SERPL-MCNC: 9 MG/DL — SIGNIFICANT CHANGE UP (ref 8.4–10.5)
CHLORIDE SERPL-SCNC: 101 MMOL/L — SIGNIFICANT CHANGE UP (ref 98–110)
CO2 SERPL-SCNC: 26 MMOL/L — SIGNIFICANT CHANGE UP (ref 17–32)
CREAT SERPL-MCNC: 1.1 MG/DL — SIGNIFICANT CHANGE UP (ref 0.7–1.5)
EGFR: 63 ML/MIN/1.73M2 — SIGNIFICANT CHANGE UP
EGFR: 63 ML/MIN/1.73M2 — SIGNIFICANT CHANGE UP
EOSINOPHIL # BLD AUTO: 0.33 K/UL — SIGNIFICANT CHANGE UP (ref 0–0.7)
EOSINOPHIL NFR BLD AUTO: 4.4 % — SIGNIFICANT CHANGE UP (ref 0–8)
GLUCOSE SERPL-MCNC: 96 MG/DL — SIGNIFICANT CHANGE UP (ref 70–99)
HGB BLD-MCNC: 12.9 G/DL — LOW (ref 14–18)
IMM GRANULOCYTES NFR BLD AUTO: 0.4 % — HIGH (ref 0.1–0.3)
LYMPHOCYTES # BLD AUTO: 15.4 % — LOW (ref 20.5–51.1)
MCHC RBC-ENTMCNC: 30.5 PG — SIGNIFICANT CHANGE UP (ref 27–31)
MCHC RBC-ENTMCNC: 32.7 G/DL — SIGNIFICANT CHANGE UP (ref 32–37)
MCV RBC AUTO: 93.4 FL — SIGNIFICANT CHANGE UP (ref 80–94)
MONOCYTES # BLD AUTO: 0.68 K/UL — HIGH (ref 0.1–0.6)
MONOCYTES NFR BLD AUTO: 9 % — SIGNIFICANT CHANGE UP (ref 1.7–9.3)
NEUTROPHILS # BLD AUTO: 5.33 K/UL — SIGNIFICANT CHANGE UP (ref 1.4–6.5)
NEUTROPHILS NFR BLD AUTO: 70.5 % — SIGNIFICANT CHANGE UP (ref 42.2–75.2)
NRBC BLD AUTO-RTO: 0 /100 WBCS — SIGNIFICANT CHANGE UP (ref 0–0)
PLATELET # BLD AUTO: 285 K/UL — SIGNIFICANT CHANGE UP (ref 130–400)
PMV BLD: 9.5 FL — SIGNIFICANT CHANGE UP (ref 7.4–10.4)
POTASSIUM SERPL-MCNC: 4.6 MMOL/L — SIGNIFICANT CHANGE UP (ref 3.5–5)
POTASSIUM SERPL-SCNC: 4.6 MMOL/L — SIGNIFICANT CHANGE UP (ref 3.5–5)
PROT SERPL-MCNC: 7.2 G/DL — SIGNIFICANT CHANGE UP (ref 6–8)
RBC # BLD: 4.23 M/UL — LOW (ref 4.7–6.1)
RBC # FLD: 12.9 % — SIGNIFICANT CHANGE UP (ref 11.5–14.5)
SODIUM SERPL-SCNC: 136 MMOL/L — SIGNIFICANT CHANGE UP (ref 135–146)
WBC # BLD: 7.55 K/UL — SIGNIFICANT CHANGE UP (ref 4.8–10.8)
WBC # FLD AUTO: 7.55 K/UL — SIGNIFICANT CHANGE UP (ref 4.8–10.8)

## 2025-04-03 PROCEDURE — 85025 COMPLETE CBC W/AUTO DIFF WBC: CPT

## 2025-04-03 PROCEDURE — 71045 X-RAY EXAM CHEST 1 VIEW: CPT

## 2025-04-03 PROCEDURE — 99285 EMERGENCY DEPT VISIT HI MDM: CPT | Mod: 25

## 2025-04-03 PROCEDURE — 72125 CT NECK SPINE W/O DYE: CPT | Mod: 26

## 2025-04-03 PROCEDURE — 70450 CT HEAD/BRAIN W/O DYE: CPT | Mod: 26

## 2025-04-03 PROCEDURE — 80053 COMPREHEN METABOLIC PANEL: CPT

## 2025-04-03 PROCEDURE — 82962 GLUCOSE BLOOD TEST: CPT

## 2025-04-03 PROCEDURE — 36000 PLACE NEEDLE IN VEIN: CPT

## 2025-04-03 PROCEDURE — 99285 EMERGENCY DEPT VISIT HI MDM: CPT | Mod: GC

## 2025-04-03 PROCEDURE — 73030 X-RAY EXAM OF SHOULDER: CPT | Mod: RT

## 2025-04-03 PROCEDURE — 71045 X-RAY EXAM CHEST 1 VIEW: CPT | Mod: 26

## 2025-04-03 PROCEDURE — L9994: CPT

## 2025-04-03 PROCEDURE — 93010 ELECTROCARDIOGRAM REPORT: CPT

## 2025-04-03 PROCEDURE — 93005 ELECTROCARDIOGRAM TRACING: CPT

## 2025-04-03 PROCEDURE — 73030 X-RAY EXAM OF SHOULDER: CPT | Mod: 26,RT

## 2025-04-03 PROCEDURE — 36415 COLL VENOUS BLD VENIPUNCTURE: CPT

## 2025-04-03 PROCEDURE — 70450 CT HEAD/BRAIN W/O DYE: CPT

## 2025-04-03 PROCEDURE — 72125 CT NECK SPINE W/O DYE: CPT

## 2025-04-03 RX ORDER — MIDAZOLAM IN 0.9 % SOD.CHLORID 1 MG/ML
2 PLASTIC BAG, INJECTION (ML) INTRAVENOUS ONCE
Refills: 0 | Status: DISCONTINUED | OUTPATIENT
Start: 2025-04-03 | End: 2025-04-03

## 2025-04-03 NOTE — ED PROVIDER NOTE - PATIENT PORTAL LINK FT
You can access the FollowMyHealth Patient Portal offered by [facility] by registering at the following website: [email or web address]. By joining [facility]’s FollowMyHealth portal, you will also be able to view your health information using other applications (apps) compatible with our system.

## 2025-04-03 NOTE — ED PROVIDER NOTE - AVIAN FLU SYMPTOMS
**EVALUATED BY ADVANCED PRACTICE PROVIDER**        [address]      Pt Name: Meghann Pozo  KIK:[id number]  [place] [facility]  [address]  [place] [address]   Phone [phone]   CULTURE, URINE   HCG, SERUM, QUALITATIVE    Narrative:     Performed at:  [facility]  [address] [facility] [place] [address]   Phone [phone]   LIPASE    Narrative:     Performed at:  [facility]  [address] [facility] [address]   Phone [phone] JOSE Zaman CNP,           JOSE Zaman CNP  05/28/20 3588

## 2025-04-03 NOTE — ED PROVIDER NOTE - CLINICAL SUMMARY MEDICAL DECISION MAKING FREE TEXT BOX
89-year-old male past medical history hypertension, advanced dementia, dyslipidemia who presents from nursing home status post mechanical fall.  Additional history obtained from EMS reports patient got up from his wheelchair and fell forward onto his right side hitting his head and right shoulder.  Patient possibly lost consciousness is not on anticoagulation.  Since fall and injury, patient's been acting baseline.    On exam, vital signs reviewed.  GCS 14 at baseline.  Patient has mild abrasion to scalp and mild tenderness to right shoulder.  Secondary survey otherwise unremarkable.  IV placed and labs sent and patient had CT scans and x-rays.  Chest x-ray shows calcified pleural plaques that are unchanged, labs are unremarkable and CT scan showed no evidence of serious acute injuries.  Shoulder x-ray shows no acute fracture.  Will discharge patient back to nursing home.

## 2025-04-03 NOTE — ED PROVIDER NOTE - ATTENDING CONTRIBUTION TO CARE
No I personally evaluated patient. I agree with the findings and plan with all documentation on chart except as documented  in my note.    89-year-old male past medical history hypertension, advanced dementia, dyslipidemia who presents from nursing home status post mechanical fall.  Additional history obtained from EMS reports patient got up from his wheelchair and fell forward onto his right side hitting his head and right shoulder.  Patient possibly lost consciousness is not on anticoagulation.  Since fall and injury, patient's been acting baseline.    On exam, vital signs reviewed.  GCS 14 at baseline.  Patient has mild abrasion to scalp and mild tenderness to right shoulder.  Secondary survey otherwise unremarkable.  IV placed and labs sent and patient had CT scans and x-rays.  Chest x-ray shows calcified pleural plaques that are unchanged, labs are unremarkable and CT scan showed no evidence of serious acute injuries.  Shoulder x-ray shows no acute fracture.  Will discharge patient back to nursing home. Yes

## 2025-04-03 NOTE — ED ADULT NURSE NOTE - NSFALLHARMRISKINTERV_ED_ALL_ED

## 2025-04-03 NOTE — ED PROVIDER NOTE - PHYSICAL EXAMINATION
VITAL SIGNS: noted  CONSTITUTIONAL: Well-developed; well-nourished; in no acute distress  HEAD: Normocephalic; mild ecchymosis to right side of head. no crepitus no step offs.   EYES: PERRL, EOM intact; conjunctiva and sclera clear  ENT: No nasal discharge, MMM, oropharynx clear without tonsillar hypertrophy or exudates  NECK: Supple; non tender. No anterior cervical lymphadenopathy noted FROM no midline ttp  CARD: S1, S2 normal; no murmurs, gallops, or rubs. Regular rate and rhythm  RESP: CTAB/L, no wheezes, rales or rhonchi  ABD: Normal bowel sounds; soft; non-distended; non-tender; no organoomegaly. No CVA tenderness  EXT: Normal ROM. No calf tenderness or edema. Distal pulses intact  BACK: non tender  NEURO: Awake and alert, oriented x 1 to self (baseline). Grossly unremarkable. No focal deficits.  SKIN: Skin exam is warm and dry, no acute rash

## 2025-04-03 NOTE — ED PROVIDER NOTE - OBJECTIVE STATEMENT
89-year-old male with past medical history of hypertension, hyperlipidemia, dementia (A&Ox1 baseline) [facility] Per EMS, pt got up from his wheelchair around 4pm this afternoon and fell forward onto his right head and his right shoulder. + LOC, - AC use. 89-year-old male with past medical history of hypertension, hyperlipidemia, dementia (A&Ox1 baseline) [facility] Per EMS, pt got up from his wheelchair around 4pm this afternoon and fell forward onto his right head and his right shoulder. + LOC, - AC use. vitals WNL via EMS in field.

## 2025-04-03 NOTE — ED PROVIDER NOTE - NSFOLLOWUPINSTRUCTIONS_ED_ALL_ED_FT
Please follow up with your primary care provider     Closed Head Injury    Closed head injury in an injury to your head that may or may not involve a traumatic brain injury (TBI). Symptoms of TBI can be short or long lasting and include headache, dizziness, interference with memory or speech, fatigue, confusion, changes in sleep, mood changes, nausea, depression/anxiety, and dulling of senses. Make sure to obtain proper rest which includes getting plenty of sleep, avoiding excessive visual stimulation, and avoiding activities that may cause physical or mental stress. Avoid any situation where there is potential for another head injury including sports.    SEEK MEDICAL CARE IF YOU HAVE THE FOLLOWING SYMPTOMS: unusual drowsiness, vomiting, severe dizziness, seizures, lightheadedness, muscular weakness, different pupil sizes, visual changes, or clear or bloody discharge from your ears or nose.

## 2025-04-03 NOTE — ED PROVIDER NOTE - DIFFERENTIAL DIAGNOSIS
Differential Diagnosis The differential diagnosis for patients clinical presentation includes but is not limited to:  sprain, strain, fracture, contusion, dislocation  CI, SDH, SAH

## 2025-05-30 ENCOUNTER — INPATIENT (INPATIENT)
Facility: HOSPITAL | Age: 89
LOS: 3 days | Discharge: NURSING, REHAB OR LONG-TERM CARE | DRG: 316 | End: 2025-06-03
Attending: PSYCHIATRY & NEUROLOGY | Admitting: PSYCHIATRY & NEUROLOGY
Payer: MEDICARE

## 2025-05-30 VITALS
RESPIRATION RATE: 16 BRPM | DIASTOLIC BLOOD PRESSURE: 72 MMHG | TEMPERATURE: 98 F | HEIGHT: 72 IN | HEART RATE: 87 BPM | SYSTOLIC BLOOD PRESSURE: 116 MMHG | WEIGHT: 160.06 LBS | OXYGEN SATURATION: 97 %

## 2025-05-30 DIAGNOSIS — I95.9 HYPOTENSION, UNSPECIFIED: ICD-10-CM

## 2025-05-30 LAB
ALBUMIN SERPL ELPH-MCNC: 3.8 G/DL — SIGNIFICANT CHANGE UP (ref 3.5–5.2)
ALP SERPL-CCNC: 145 U/L — HIGH (ref 30–115)
ALT FLD-CCNC: 36 U/L — SIGNIFICANT CHANGE UP (ref 0–41)
ANION GAP SERPL CALC-SCNC: 10 MMOL/L — SIGNIFICANT CHANGE UP (ref 7–14)
APPEARANCE UR: CLEAR — SIGNIFICANT CHANGE UP
APTT BLD: 27.4 SEC — SIGNIFICANT CHANGE UP (ref 27–39.2)
AST SERPL-CCNC: 94 U/L — HIGH (ref 0–41)
BACTERIA # UR AUTO: NEGATIVE /HPF — SIGNIFICANT CHANGE UP
BASOPHILS # BLD AUTO: 0.04 K/UL — SIGNIFICANT CHANGE UP (ref 0–0.2)
BASOPHILS NFR BLD AUTO: 0.4 % — SIGNIFICANT CHANGE UP (ref 0–1)
BILIRUB SERPL-MCNC: 0.5 MG/DL — SIGNIFICANT CHANGE UP (ref 0.2–1.2)
BILIRUB UR-MCNC: NEGATIVE — SIGNIFICANT CHANGE UP
BUN SERPL-MCNC: 22 MG/DL — HIGH (ref 10–20)
CALCIUM SERPL-MCNC: 9.5 MG/DL — SIGNIFICANT CHANGE UP (ref 8.4–10.5)
CAST: 7 /LPF — HIGH (ref 0–4)
CHLORIDE SERPL-SCNC: 102 MMOL/L — SIGNIFICANT CHANGE UP (ref 98–110)
CO2 SERPL-SCNC: 27 MMOL/L — SIGNIFICANT CHANGE UP (ref 17–32)
COD CRY URNS QL: PRESENT
COLOR SPEC: YELLOW — SIGNIFICANT CHANGE UP
CREAT SERPL-MCNC: 1.8 MG/DL — HIGH (ref 0.7–1.5)
DIFF PNL FLD: ABNORMAL
EGFR: 35 ML/MIN/1.73M2 — LOW
EGFR: 35 ML/MIN/1.73M2 — LOW
EOSINOPHIL # BLD AUTO: 0.06 K/UL — SIGNIFICANT CHANGE UP (ref 0–0.7)
EOSINOPHIL NFR BLD AUTO: 0.6 % — SIGNIFICANT CHANGE UP (ref 0–8)
GLUCOSE BLDC GLUCOMTR-MCNC: 99 MG/DL — SIGNIFICANT CHANGE UP (ref 70–99)
GLUCOSE SERPL-MCNC: 92 MG/DL — SIGNIFICANT CHANGE UP (ref 70–99)
GLUCOSE UR QL: NEGATIVE MG/DL — SIGNIFICANT CHANGE UP
HCT VFR BLD CALC: 40.4 % — LOW (ref 42–52)
HGB BLD-MCNC: 13.2 G/DL — LOW (ref 14–18)
IMM GRANULOCYTES NFR BLD AUTO: 0.8 % — HIGH (ref 0.1–0.3)
INR BLD: 0.97 RATIO — SIGNIFICANT CHANGE UP (ref 0.65–1.3)
KETONES UR QL: ABNORMAL MG/DL
LEUKOCYTE ESTERASE UR-ACNC: NEGATIVE — SIGNIFICANT CHANGE UP
LYMPHOCYTES # BLD AUTO: 1.57 K/UL — SIGNIFICANT CHANGE UP (ref 1.2–3.4)
LYMPHOCYTES # BLD AUTO: 15.5 % — LOW (ref 20.5–51.1)
MCHC RBC-ENTMCNC: 30.3 PG — SIGNIFICANT CHANGE UP (ref 27–31)
MCHC RBC-ENTMCNC: 32.7 G/DL — SIGNIFICANT CHANGE UP (ref 32–37)
MCV RBC AUTO: 92.9 FL — SIGNIFICANT CHANGE UP (ref 80–94)
MONOCYTES # BLD AUTO: 1.22 K/UL — HIGH (ref 0.1–0.6)
MONOCYTES NFR BLD AUTO: 12.1 % — HIGH (ref 1.7–9.3)
NEUTROPHILS # BLD AUTO: 7.13 K/UL — HIGH (ref 1.4–6.5)
NEUTROPHILS NFR BLD AUTO: 70.6 % — SIGNIFICANT CHANGE UP (ref 42.2–75.2)
NITRITE UR-MCNC: NEGATIVE — SIGNIFICANT CHANGE UP
NRBC BLD AUTO-RTO: 0 /100 WBCS — SIGNIFICANT CHANGE UP (ref 0–0)
PH UR: 5.5 — SIGNIFICANT CHANGE UP (ref 5–8)
PLATELET # BLD AUTO: 316 K/UL — SIGNIFICANT CHANGE UP (ref 130–400)
PMV BLD: 9.6 FL — SIGNIFICANT CHANGE UP (ref 7.4–10.4)
POTASSIUM SERPL-MCNC: 4.3 MMOL/L — SIGNIFICANT CHANGE UP (ref 3.5–5)
POTASSIUM SERPL-SCNC: 4.3 MMOL/L — SIGNIFICANT CHANGE UP (ref 3.5–5)
PROT SERPL-MCNC: 6.7 G/DL — SIGNIFICANT CHANGE UP (ref 6–8)
PROT UR-MCNC: 30 MG/DL
PROTHROM AB SERPL-ACNC: 11.4 SEC — SIGNIFICANT CHANGE UP (ref 9.95–12.87)
RBC # BLD: 4.35 M/UL — LOW (ref 4.7–6.1)
RBC # FLD: 13.3 % — SIGNIFICANT CHANGE UP (ref 11.5–14.5)
RBC CASTS # UR COMP ASSIST: 4 /HPF — SIGNIFICANT CHANGE UP (ref 0–4)
SODIUM SERPL-SCNC: 139 MMOL/L — SIGNIFICANT CHANGE UP (ref 135–146)
SP GR SPEC: >1.03 — HIGH (ref 1–1.03)
SQUAMOUS # UR AUTO: 1 /HPF — SIGNIFICANT CHANGE UP (ref 0–5)
TROPONIN T, HIGH SENSITIVITY RESULT: 24 NG/L — HIGH (ref 6–21)
UROBILINOGEN FLD QL: 1 MG/DL — SIGNIFICANT CHANGE UP (ref 0.2–1)
WBC # BLD: 10.1 K/UL — SIGNIFICANT CHANGE UP (ref 4.8–10.8)
WBC # FLD AUTO: 10.1 K/UL — SIGNIFICANT CHANGE UP (ref 4.8–10.8)
WBC UR QL: 1 /HPF — SIGNIFICANT CHANGE UP (ref 0–5)

## 2025-05-30 PROCEDURE — 36415 COLL VENOUS BLD VENIPUNCTURE: CPT

## 2025-05-30 PROCEDURE — 93306 TTE W/DOPPLER COMPLETE: CPT

## 2025-05-30 PROCEDURE — 82607 VITAMIN B-12: CPT

## 2025-05-30 PROCEDURE — 80053 COMPREHEN METABOLIC PANEL: CPT

## 2025-05-30 PROCEDURE — 85025 COMPLETE CBC W/AUTO DIFF WBC: CPT

## 2025-05-30 PROCEDURE — 72141 MRI NECK SPINE W/O DYE: CPT

## 2025-05-30 PROCEDURE — 84484 ASSAY OF TROPONIN QUANT: CPT

## 2025-05-30 PROCEDURE — 97535 SELF CARE MNGMENT TRAINING: CPT | Mod: GO

## 2025-05-30 PROCEDURE — 84100 ASSAY OF PHOSPHORUS: CPT

## 2025-05-30 PROCEDURE — 86140 C-REACTIVE PROTEIN: CPT

## 2025-05-30 PROCEDURE — 80061 LIPID PANEL: CPT

## 2025-05-30 PROCEDURE — 83036 HEMOGLOBIN GLYCOSYLATED A1C: CPT

## 2025-05-30 PROCEDURE — 97530 THERAPEUTIC ACTIVITIES: CPT | Mod: GP

## 2025-05-30 PROCEDURE — 97162 PT EVAL MOD COMPLEX 30 MIN: CPT | Mod: GP

## 2025-05-30 PROCEDURE — 70551 MRI BRAIN STEM W/O DYE: CPT

## 2025-05-30 PROCEDURE — 92610 EVALUATE SWALLOWING FUNCTION: CPT | Mod: GN

## 2025-05-30 PROCEDURE — 70498 CT ANGIOGRAPHY NECK: CPT | Mod: 26

## 2025-05-30 PROCEDURE — 71045 X-RAY EXAM CHEST 1 VIEW: CPT | Mod: 26

## 2025-05-30 PROCEDURE — 82962 GLUCOSE BLOOD TEST: CPT

## 2025-05-30 PROCEDURE — 99223 1ST HOSP IP/OBS HIGH 75: CPT

## 2025-05-30 PROCEDURE — 70450 CT HEAD/BRAIN W/O DYE: CPT | Mod: 26,XU

## 2025-05-30 PROCEDURE — 70551 MRI BRAIN STEM W/O DYE: CPT | Mod: 26

## 2025-05-30 PROCEDURE — 84443 ASSAY THYROID STIM HORMONE: CPT

## 2025-05-30 PROCEDURE — 82746 ASSAY OF FOLIC ACID SERUM: CPT

## 2025-05-30 PROCEDURE — 97167 OT EVAL HIGH COMPLEX 60 MIN: CPT | Mod: GO

## 2025-05-30 PROCEDURE — 99291 CRITICAL CARE FIRST HOUR: CPT

## 2025-05-30 PROCEDURE — 93010 ELECTROCARDIOGRAM REPORT: CPT | Mod: 76

## 2025-05-30 PROCEDURE — 85730 THROMBOPLASTIN TIME PARTIAL: CPT

## 2025-05-30 PROCEDURE — 85652 RBC SED RATE AUTOMATED: CPT

## 2025-05-30 PROCEDURE — 82550 ASSAY OF CK (CPK): CPT

## 2025-05-30 PROCEDURE — 70496 CT ANGIOGRAPHY HEAD: CPT | Mod: 26

## 2025-05-30 PROCEDURE — 0042T: CPT

## 2025-05-30 PROCEDURE — 85610 PROTHROMBIN TIME: CPT

## 2025-05-30 PROCEDURE — 83735 ASSAY OF MAGNESIUM: CPT

## 2025-05-30 PROCEDURE — 95819 EEG AWAKE AND ASLEEP: CPT

## 2025-05-30 RX ORDER — ESCITALOPRAM OXALATE 20 MG/1
10 TABLET ORAL DAILY
Refills: 0 | Status: DISCONTINUED | OUTPATIENT
Start: 2025-05-31 | End: 2025-06-03

## 2025-05-30 RX ORDER — LATANOPROST PF 0.05 MG/ML
1 SOLUTION/ DROPS OPHTHALMIC AT BEDTIME
Refills: 0 | Status: DISCONTINUED | OUTPATIENT
Start: 2025-05-30 | End: 2025-06-03

## 2025-05-30 RX ORDER — CLOPIDOGREL BISULFATE 75 MG/1
75 TABLET, FILM COATED ORAL DAILY
Refills: 0 | Status: DISCONTINUED | OUTPATIENT
Start: 2025-05-31 | End: 2025-05-31

## 2025-05-30 RX ORDER — ENOXAPARIN SODIUM 100 MG/ML
40 INJECTION SUBCUTANEOUS ONCE
Refills: 0 | Status: COMPLETED | OUTPATIENT
Start: 2025-05-30 | End: 2025-05-30

## 2025-05-30 RX ORDER — CLOPIDOGREL BISULFATE 75 MG/1
75 TABLET, FILM COATED ORAL ONCE
Refills: 0 | Status: COMPLETED | OUTPATIENT
Start: 2025-05-30 | End: 2025-05-30

## 2025-05-30 RX ORDER — SODIUM CHLORIDE 9 G/1000ML
500 INJECTION, SOLUTION INTRAVENOUS ONCE
Refills: 0 | Status: COMPLETED | OUTPATIENT
Start: 2025-05-30 | End: 2025-05-30

## 2025-05-30 RX ORDER — B1/B2/B3/B5/B6/B12/VIT C/FOLIC 500-0.5 MG
1 TABLET ORAL DAILY
Refills: 0 | Status: DISCONTINUED | OUTPATIENT
Start: 2025-05-30 | End: 2025-06-03

## 2025-05-30 RX ORDER — ATORVASTATIN CALCIUM 80 MG/1
80 TABLET, FILM COATED ORAL AT BEDTIME
Refills: 0 | Status: DISCONTINUED | OUTPATIENT
Start: 2025-05-30 | End: 2025-06-01

## 2025-05-30 RX ORDER — ACETAMINOPHEN 500 MG/5ML
650 LIQUID (ML) ORAL ONCE
Refills: 0 | Status: COMPLETED | OUTPATIENT
Start: 2025-05-30 | End: 2025-05-30

## 2025-05-30 RX ORDER — HEPARIN SODIUM 1000 [USP'U]/ML
5000 INJECTION INTRAVENOUS; SUBCUTANEOUS EVERY 12 HOURS
Refills: 0 | Status: DISCONTINUED | OUTPATIENT
Start: 2025-05-30 | End: 2025-06-03

## 2025-05-30 RX ORDER — ASPIRIN 325 MG
81 TABLET ORAL DAILY
Refills: 0 | Status: DISCONTINUED | OUTPATIENT
Start: 2025-05-30 | End: 2025-05-31

## 2025-05-30 RX ORDER — MEMANTINE HYDROCHLORIDE 21 MG/1
10 CAPSULE, EXTENDED RELEASE ORAL
Refills: 0 | Status: DISCONTINUED | OUTPATIENT
Start: 2025-05-30 | End: 2025-06-03

## 2025-05-30 RX ADMIN — SODIUM CHLORIDE 500 MILLILITER(S): 9 INJECTION, SOLUTION INTRAVENOUS at 13:40

## 2025-05-30 RX ADMIN — Medication 650 MILLIGRAM(S): at 22:06

## 2025-05-30 RX ADMIN — ENOXAPARIN SODIUM 40 MILLIGRAM(S): 100 INJECTION SUBCUTANEOUS at 16:06

## 2025-05-30 RX ADMIN — HEPARIN SODIUM 5000 UNIT(S): 1000 INJECTION INTRAVENOUS; SUBCUTANEOUS at 18:41

## 2025-05-30 RX ADMIN — Medication 650 MILLIGRAM(S): at 23:00

## 2025-05-30 RX ADMIN — Medication 81 MILLIGRAM(S): at 16:05

## 2025-05-30 RX ADMIN — MEMANTINE HYDROCHLORIDE 10 MILLIGRAM(S): 21 CAPSULE, EXTENDED RELEASE ORAL at 18:41

## 2025-05-30 RX ADMIN — Medication 60 MILLILITER(S): at 16:06

## 2025-05-30 RX ADMIN — ATORVASTATIN CALCIUM 80 MILLIGRAM(S): 80 TABLET, FILM COATED ORAL at 22:06

## 2025-05-30 RX ADMIN — CLOPIDOGREL BISULFATE 75 MILLIGRAM(S): 75 TABLET, FILM COATED ORAL at 16:05

## 2025-05-30 NOTE — ED ADULT NURSE NOTE - CHIEF COMPLAINT QUOTE
BIBA from [facility], sent by RN due to BP (84/52), VK=383, pt weak, lethargic, leaning on right side. +right facial droop noted in Triage. Unknown LKW.  Pt s/p found on floor by staff last night @ ~ 11pm. Pt has dementia and is a poor historian. Triage XG=756. /72, HR=87. EKG done. Code Stroke activated @ 12:40 pm.

## 2025-05-30 NOTE — ED ADULT TRIAGE NOTE - CHIEF COMPLAINT QUOTE
BIBA from [facility], sent by RN due to BP (84/52), IU=543, pt weak, +right facial droop & leaning on right side noted this morning at @ 8am. Pt s/p found on floor by staff last night @ ~ 11pm. Pt has dementia and is a poor historian. Triage JL=951. /72, HR=87. BIBA from [facility], sent by RN due to BP (84/52), FR=465, pt weak, lethargic, leaning on right side. +right facial droop. Unknown LKW.  Pt s/p found on floor by staff last night @ ~ 11pm. Pt has dementia and is a poor historian. Triage DV=170. /72, HR=87. EKG done. BIBA from [facility], sent by RN due to BP (84/52), EF=634, pt weak, lethargic, leaning on right side. +right facial droop noted in Triage. Unknown LKW.  Pt s/p found on floor by staff last night @ ~ 11pm. Pt has dementia and is a poor historian. Triage EK=305. /72, HR=87. EKG done. Code Stroke activated @ 12:40 pm.

## 2025-05-30 NOTE — ED PROVIDER NOTE - CLINICAL SUMMARY MEDICAL DECISION MAKING FREE TEXT BOX
Upon further evaluation there is confusion as to prior present weakness in the right upper extremity and facial droop as discussed by his son is at the bedside.  Discussed with neurology will continue to observe and reassess Upon further evaluation there is confusion as to prior present weakness in the right upper extremity and facial droop as discussed by his son is at the bedside.  Discussed with neurology will continue to observe and reassess.

## 2025-05-30 NOTE — CONSULT NOTE ADULT - SUBJECTIVE AND OBJECTIVE BOX
HPI: Patient is a 89 yo M w/PMH of HTN, HLD, dementia (AxO1 at baseline) and right arm baseline weakness for last 6 months with increased falls over the same time period who presented to the ED for weakness, lethargy, R facial droop, and favoring the right side this morning. Patient was notably found on the floor last night at 11 pm. As per patient's son, patient has been falling more frequently over the last 6 months and walks with a rolling walker at baseline. His BP was 84/52 and HR was 240 while in the nursing home prior to coming to the ED, with triage /72 and HR 87. His initial NIHSS during the stroke code was 15, with NIHSS improving after being given IVF boluses. His CTH showed no large territory stroke or hemorrhage, CTP showed no perfusion deficit, and CTA H&N showed R proximal ICA mild stenosis and possible L MCA mild-moderate stenosis but otherwise no other stenosis, LVO, or aneurysm.     < from: CT Brain Stroke Protocol (05.30.25 @ 12:51) >  IMPRESSION:    No acute intracranial pathology.    < end of copied text >      PAST MEDICAL & SURGICAL HISTORY:  Alzheimer disease      Hypertension      Dementia      Heart failure      No significant past surgical history          Hospital Course:    TODAY'S SUBJECTIVE & REVIEW OF SYMPTOMS:     Constitutional WNL   Cardio WNL   Resp WNL   GI WNL  Heme WNL  Endo WNL  Skin WNL  MSK WNL  Neuro right facial droop and right side weakness   Cognitive WNL  Psych WNL      MEDICATIONS  (STANDING):  aspirin  chewable 81 milliGRAM(s) Oral daily  atorvastatin 80 milliGRAM(s) Oral at bedtime  heparin   Injectable 5000 Unit(s) SubCutaneous every 12 hours  latanoprost 0.005% Ophthalmic Solution 1 Drop(s) Both EYES at bedtime  memantine 10 milliGRAM(s) Oral two times a day  multivitamin 1 Tablet(s) Oral daily  sodium chloride 0.9%. 1000 milliLiter(s) (60 mL/Hr) IV Continuous <Continuous>    MEDICATIONS  (PRN):      FAMILY HISTORY:      Allergies    No Known Allergies    Intolerances        SOCIAL HISTORY:    [  ] Etoh  [  ] Smoking  [  ] Substance abuse     Home Environment:  [   ] Home Alone  [   ] Lives with Family  [   ] Home Health Aid    Dwelling:  [   ] Apartment  [   ] Private House  [ x  ] Assisted living facility   [   ] Skilled Nursing Facility      [   ] Short Term  [   ] Long Term  [   ] Stairs       Elevator [   ]    FUNCTIONAL STATUS PTA: (Check all that apply)  Ambulation: [  x  ]Independent    [   ] Dependent     [   ] Non-Ambulatory  Assistive Device: [   ] SA Cane  [   ]  Q Cane  [   ] Walker  [   ]  Wheelchair  ADL : [   ] Independent  [    ]  Dependent       Vital Signs Last 24 Hrs  T(C): 36.4 (30 May 2025 16:24), Max: 37.6 (30 May 2025 13:00)  T(F): 97.6 (30 May 2025 16:24), Max: 99.6 (30 May 2025 13:00)  HR: 80 (30 May 2025 16:24) (74 - 87)  BP: 155/73 (30 May 2025 16:24) (116/72 - 165/78)  BP(mean): --  RR: 18 (30 May 2025 16:00) (16 - 18)  SpO2: 97% (30 May 2025 16:24) (97% - 98%)    Parameters below as of 30 May 2025 16:24  Patient On (Oxygen Delivery Method): room air          PHYSICAL EXAM: Awake but confused, Ox1  GENERAL: NAD  HEAD:  Normocephalic  CHEST/LUNG: Clear   HEART: S1S2+  ABDOMEN: Soft, Nontender  EXTREMITIES:  no calf tenderness    NERVOUS SYSTEM:  Cranial Nerves 2-12 intact [   ] Abnormal  [  x ]right facial droop, + dysarthria   ROM: WFL all extremities [ x  ]  Abnormal [   ]  Motor Strength: WFL all extremities  [   ]  Abnormal [ x  ]4/5 right side, 5/5 left side   Sensation: intact to light touch [  x ] Abnormal [   ]    FUNCTIONAL STATUS:  Bed Mobility: Independent [   ]  Supervision [   ]  Needs Assistance [  x ]  N/A [   ]  Transfers: Independent [   ]  Supervision [   ]  Needs Assistance [   ]  N/A [   ]   Ambulation: Independent [   ]  Supervision [   ]  Needs Assistance [   ]  N/A [   ]  ADL: Independent [   ] Requires Assistance [   ] N/A [   ]      LABS:                        13.2   10.10 )-----------( 316      ( 30 May 2025 13:15 )             40.4     05-30    139  |  102  |  22[H]  ----------------------------<  92  4.3   |  27  |  1.8[H]    Ca    9.5      30 May 2025 13:15    TPro  6.7  /  Alb  3.8  /  TBili  0.5  /  DBili  x   /  AST  94[H]  /  ALT  36  /  AlkPhos  145[H]  05-30    PT/INR - ( 30 May 2025 13:15 )   PT: 11.40 sec;   INR: 0.97 ratio         PTT - ( 30 May 2025 13:15 )  PTT:27.4 sec  Urinalysis Basic - ( 30 May 2025 15:00 )    Color: Yellow / Appearance: Clear / SG: >1.030 / pH: x  Gluc: x / Ketone: x  / Bili: Negative / Urobili: 1.0 mg/dL   Blood: x / Protein: 30 mg/dL / Nitrite: Negative   Leuk Esterase: Negative / RBC: 4 /HPF / WBC 1 /HPF   Sq Epi: x / Non Sq Epi: 1 /HPF / Bacteria: Negative /HPF        RADIOLOGY & ADDITIONAL STUDIES:

## 2025-05-30 NOTE — H&P ADULT - ASSESSMENT
#Stroke workup  - CT head non con: No acute pathology  - CTA H&N: No LVO  - CTP: No perfusion mismatch  - S/p Load Aspirin 325mg and Plavix 300mg  - Admit to Stroke Unit and Tele monitoring  - Neurochecks q4 hrs  - Fall and Aspiration precautions  - Provide stroke education  - Physical therapy/Occupational therapy/Speech and Swallow/Physiatry eval  - F/u HbA1c, TSH and Lipid panel  - F/u MRI Brain Non-con  - F/u TTE w  - C/w Aspirin 81mg and Plavix 75mg daily  - C/w Atorvastatin 80mg daily    #Misc:  - DVT Prophylaxis:  - Diet: NPO until s/s eval  - Code status: Full code  - Dispo: Stroke Unit Patient is a 89 yo M w/PMH of HTN, HLD, dementia (AxO1 at baseline) and right arm baseline weakness for last 6 months with increased falls over the same time period who presented to the ED for weakness, lethargy, R facial droop, and worsened R side weakness with NIHSS initially 15 but improving to NIHSS 4 after 500 cc IVF bolus with CTH, CTP, and CTA H&N largely unremarkable with possible L MCA stenosis now admitted for work up and management of possible L MCA territory stroke.    #Improving aphasia, R facial droop, R-sided weakness  #Possible L MCA territory stroke  - Admit to Stroke Unit and Tele monitoring  - Neurochecks q4 hrs  - SBP goal 120-180  - Fall and Aspiration precautions  - Provide stroke education  - Physical therapy/Occupational therapy/Speech and Swallow/Physiatry eval  - F/u HbA1c, TSH and Lipid panel  - F/u MRI Brain Non-con  - F/u TTE w/bubble  - Start Aspirin 81mg and Plavix 75mg daily  - Start Atorvastatin 80mg daily    #IGNACIO  - s/p 500 cc bolus of LR  - c/w NS 60 cc/hr    #HTN  - HOLDING Home lisinopril 10 mg daily    #HLD  - On home atorvastatin 40 mg nightly  - start atorvastatin 80 mg nightly    #Dementia  #Agitation  - c/w Lexapro 10 mg daily  - c/w memantine 10 mg bid    #Glaucoma  - latanoprost 1 drop at bedtime in both eyes    #Misc:  - DVT Prophylaxis:  - Diet: Soft and bite sized, DASH  - Dispo: Stroke Unit Patient is a 89 yo M w/PMH of HTN, HLD, dementia (AxO1 at baseline) and right arm baseline weakness for last 6 months with increased falls over the same time period who presented to the ED for weakness, lethargy, R facial droop, and worsened R side weakness with NIHSS initially 15 but improving to NIHSS 4 after 500 cc IVF bolus with CTH, CTP, and CTA H&N largely unremarkable with possible L MCA stenosis now admitted for work up and management of possible L MCA territory stroke.    #Improving aphasia, R facial droop, R-sided weakness  #Possible L MCA territory stroke  #Rule out seizure  - Admit to Stroke Unit and Tele monitoring  - Neurochecks q4 hrs  - SBP goal 120-180  - Fall and Aspiration precautions  - Provide stroke education  - Physical therapy/Occupational therapy/Speech and Swallow/Physiatry eval  - F/u HbA1c, TSH and Lipid panel  - F/u MRI Brain Non-con  - F/u TTE w/bubble  - F/u rEEG read  - Start Aspirin 81mg and Plavix 75mg daily  - Start Atorvastatin 80mg daily    #IGNACIO  - s/p 500 cc bolus of LR  - c/w NS 60 cc/hr    #HTN  - HOLDING Home lisinopril 10 mg daily    #HLD  - On home atorvastatin 40 mg nightly  - start atorvastatin 80 mg nightly    #Dementia  #Agitation  - c/w Lexapro 10 mg daily  - c/w memantine 10 mg bid    #Glaucoma  - latanoprost 1 drop at bedtime in both eyes    #Misc:  - DVT Prophylaxis:  - Diet: Soft and bite sized, DASH  - Dispo: Stroke Unit Patient is a 89 yo M w/PMH of HTN, HLD, dementia (AxO1 at baseline) and right arm baseline weakness for last 6 months with increased falls over the same time period who presented to the ED for weakness, lethargy, R facial droop, and worsened R side weakness with NIHSS initially 15 but improving to NIHSS 4 after 500 cc IVF bolus with CTH, CTP, and CTA H&N largely unremarkable with possible L MCA stenosis now admitted for work up and management of possible L MCA territory stroke.    #Improving aphasia, R facial droop, R-sided weakness  #Possible L MCA territory stroke  #Rule out seizure  - Admit to Stroke Unit and Tele monitoring  - Neurochecks q4 hrs  - SBP goal 120-180  - Fall and Aspiration precautions  - Provide stroke education  - Physical therapy/Occupational therapy/Speech and Swallow/Physiatry eval  - F/u HbA1c, TSH and Lipid panel  - F/u MRI Brain Non-con  - F/u TTE w/bubble  - F/u rEEG read  - Start Aspirin 81mg and Plavix 75mg daily  - Start Atorvastatin 80mg daily    #IGNACIO  - s/p 500 cc bolus of LR  - c/w NS 60 cc/hr    #HTN  - HOLDING Home lisinopril 10 mg daily    #HLD  - On home atorvastatin 40 mg nightly  - start atorvastatin 80 mg nightly    #Dementia  #Agitation  - c/w Lexapro 10 mg daily  - c/w memantine 10 mg bid    #Glaucoma  - latanoprost 1 drop at bedtime in both eyes    #Misc:  - DVT Prophylaxis: Heparin SQ  - Diet: Soft and bite sized, DASH  - Dispo: Stroke Unit Patient is a 89 yo M w/PMH of HTN, HLD, dementia (AxO1 at baseline) and right arm baseline weakness for last 6 months with increased falls over the same time period who presented to the ED for weakness, lethargy, R facial droop, and worsened R side weakness with NIHSS initially 15 but improving to NIHSS 4 after 500 cc IVF bolus with CTH, CTP, and CTA H&N largely unremarkable with possible L MCA stenosis now admitted for work up and management of possible L MCA territory stroke.    #Improving aphasia, R facial droop, R-sided weakness  #Possible L MCA territory stroke  #Rule out seizure  - Admit to Stroke Unit and Tele monitoring  - Neurochecks q4 hrs  - SBP goal 120-180  - Fall and Aspiration precautions  - Provide stroke education  - Physical therapy/Occupational therapy/Speech and Swallow/Physiatry eval  - F/u HbA1c, TSH and Lipid panel  - F/u MRI Brain Non-con  - F/u TTE w/bubble  - F/u rEEG read  - Consider MRI C-spine noncon for chronic RUE weakness considering patient has RUE hyperreflexia, if patient has negative brain MRI  - Start Aspirin 81mg and Plavix 75mg daily  - Start Atorvastatin 80mg daily    #IGNACIO  - s/p 500 cc bolus of LR  - c/w NS 60 cc/hr    #HTN  - HOLDING Home lisinopril 10 mg daily    #HLD  - On home atorvastatin 40 mg nightly  - start atorvastatin 80 mg nightly    #Dementia  #Agitation  - c/w Lexapro 10 mg daily  - c/w memantine 10 mg bid    #Glaucoma  - latanoprost 1 drop at bedtime in both eyes    #Misc:  - DVT Prophylaxis: Heparin SQ  - Diet: Soft and bite sized, DASH  - Dispo: Stroke Unit

## 2025-05-30 NOTE — H&P ADULT - HISTORY OF PRESENT ILLNESS
Patient is a 89 yo M w/PMH of HTN, HLD, dementia (AxO1 at baseline) and right arm baseline weakness for last 6 months with increased falls over the same time period who presented to the ED for weakness, lethargy, R facial droop, and favoring the right side this morning. Patient was notably found on the floor last night at 11 pm. As per patient's son, patient has been falling more frequently over the last 6 months and walks with a rolling walker at baseline. His BP was 84/52 and HR was 240 while in the nursing home prior to coming to the ED, with triage /72 and HR 87. His initial NIHSS during the stroke code was 15, with NIHSS improving after being given IVF boluses. His CTH showed no large territory stroke or hemorrhage, CTP showed no perfusion deficit, and CTA H&N showed R proximal ICA mild stenosis and possible L MCA mild-moderate stenosis but otherwise no other stenosis, LVO, or aneurysm.     When I assessed the patient, NIHSS had improved to 4, close to the patient's baseline as per patient's son. He reports that in March, his father came to the hospital with a very similar presentation of severe weakness and aphasia after sustaining a fall with quick resolution of his symptoms like this time.    Patient's son denies any history of stroke or seizure in his father. He also denies any smoking, drinking, or substance use. English

## 2025-05-30 NOTE — ED ADULT NURSE NOTE - OBJECTIVE STATEMENT
89 y/o male BIBA with c/o hypotension and tachycardia. nursing staff @ assisted living noted patient to be leaning to right side and (+) facial droop

## 2025-05-30 NOTE — H&P ADULT - NSHPLABSRESULTS_GEN_ALL_CORE
<<<<<LABS>>>>>                        13.2   10.10 )-----------( 316      ( 30 May 2025 13:15 )             40.4     05-30    139  |  102  |  22[H]  ----------------------------<  92  4.3   |  27  |  1.8[H]    Ca    9.5      30 May 2025 13:15    TPro  6.7  /  Alb  3.8  /  TBili  0.5  /  DBili  x   /  AST  94[H]  /  ALT  36  /  AlkPhos  145[H]  05-30    PT/INR - ( 30 May 2025 13:15 )   PT: 11.40 sec;   INR: 0.97 ratio         PTT - ( 30 May 2025 13:15 )  PTT:27.4 sec  Urinalysis Basic - ( 30 May 2025 15:00 )    Color: Yellow / Appearance: Clear / SG: >1.030 / pH: x  Gluc: x / Ketone: x  / Bili: Negative / Urobili: 1.0 mg/dL   Blood: x / Protein: 30 mg/dL / Nitrite: Negative   Leuk Esterase: Negative / RBC: 4 /HPF / WBC 1 /HPF   Sq Epi: x / Non Sq Epi: 1 /HPF / Bacteria: Negative /HPF    -----------------------------------------------------------------------------------------------------------------------------------------------------------------------------------------------

## 2025-05-30 NOTE — ED ADULT TRIAGE NOTE - HOW PATIENT ADDRESSED, PROFILE
"PROGRESS NOTE  [facility] ([facility]) HOSPITALIST SERVICE    Patient: Fer Walker Date: 11/16/2020   Attending: Corby Chamberlain MD Admit Date: 10/31/2020         Subjective: Fer Walker is a 68year old year old male who is being seen in follow up for Pneumonia due to COVID-19 virus. Patient not very cooperative with PT.  C/o generalized weakness  O2 need down to 3L. No worsening SOB. No new episode of hemoptysis. Medications: Personally reviewed today in this patient's orders section of EPIC. ALLERGIES:   Allergen Reactions   â¢ Morphine RASH and NAUSEA         PHYSICAL EXAM:  Visit Vitals  /60 (BP Location: RUE - Right upper extremity, Patient Position: Left side lying)   Pulse 82   Temp 99.4 Â°F (37.4 Â°C) (Temporal)   Resp 18   Ht 5' 8"" (1.727 m)   Wt 55.2 kg   SpO2 96%   BMI 18.50 kg/mÂ²     General: not in dstress.  Cachectic  Lungs: Decreased breathsounds at the bases and Bibasilar rales  Heart:  S1, S2 present, No murmurs, No rubs and No gallops  Abdomen: Soft, Nontender, Non-distended, Bowel sounds normal, No hepatosplenomegaly, No palpable masses or hernias and No rebound or guarding  Extremities: No edema  Skin:             Warm, dry and without rash    Labs:  Recent Labs   Lab 11/16/20  0703 11/15/20  0801 11/12/20  0619   WBC 7.0 5.6 15.0*   RBC 3.45* 3.35* 4.49*   HGB 11.1* 10.9* 14.4   HCT 33.9* 32.7* 43.5    200 305   SEG 82 82 88     Recent Labs   Lab 11/16/20  0703 11/15/20  0801 11/14/20  0750   SODIUM 135  132* 132* 133*   POTASSIUM 4.0  3.7 3.6 3.8   CHLORIDE 100  99 96* 97*   CO2 28  27 30 33*   BUN 22*  22* 28* 44*   CREATININE 0.66*  0.66* 0.73 0.88   GLUCOSE 75  81 82 82   CALCIUM 7.8*  8.1* 8.2* 8.0*       Microbiology:  Microbiology Results     None          Consulting Physician(s):  PHARMACY TO DOSE AND MONITOR VANCOMYCIN  IP CONSULT TO INFECTIOUS DISEASES  IP CONSULT TO PULMONOLOGY  IP CONSULT TO NUTRITION SERVICES  IP CONSULT TO PALLIATIVE " CARE      Assessment & Plan:  Principal Problem:    Pneumonia due to COVID-19 virus  Active Problems:    Wegener's granulomatosis (CMS/HCC)    Rheumatoid arthritis involving multiple sites with positive rheumatoid factor (CMS/HCC)    CAD (coronary artery disease)    COPD (chronic obstructive pulmonary disease) (CMS/HCC)    Calcification of lung    Hypothyroid    Esophageal reflux    Malignant 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 -Oxygen need down to 3L and will wean off O2 gradually as tolerated. COVID-19 Pneumonia-- Treated with Remdesivir, Covalescent plasma and Decadron. Cavitary lung lesion:   -Patient had CT of the chest last night for chest pain; no PE but showed cavitary lesion and xray repeated this morning did not report the lesion.   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 -Pulmonologist following.   -Patient afebrile with no leukocytosis. If febrile, will get Procalcitonin     ? Hemoptysis-- will decrease dose of Lovenox to 40mg daily. If recurrent, will hold anticoagulation. D dimer is increasing though. Last CT showed no PE. CAP pneumonia-- likely superimposed on the COVID -Treated with Cefepime. Polyangitis with Granulmatosis-- In remission. Pulmonary fibrosis-- stable. Pulmonologist note appreciated. Acute Kidney Injury-- resolved. -Avoid nephrotoxins. Hyperkalemia--Resolved--K down from 5.5 to 4.0    Elevated D dimer-- likely due to COVID- Patient not tachycardic. On Lovenox. CT 10/30-- no PE. Hypothyroidism/ HTN/ Oral Trush/ Malnutrition-- Will continue current treatment. Rheumatoid arthritis-- Methotrexate on hold given the COVID. Will monitor. Skin Tear between buttocks. Decub ulcer precautions. Palliative Care for multiple medical problems.        Code status: Full Resuscitation    Disposition:  To be determined    Ezra Guzman MD  [id number] [facility]  11/16/2020  3:31 PM [place]

## 2025-05-30 NOTE — CONSULT NOTE ADULT - ASSESSMENT
IMPRESSION: Rehab of r/o stroke / R HP, right facial droop, dysarthria /  HTN, HLD, dementia (AxO1 at baseline) and right arm baseline weakness for last 6 months    PRECAUTIONS: [   ] Cardiac  [   ] Respiratory  [   ] Seizures [   ] Contact Isolation  [   ] Droplet Isolation  [   ] Other    Weight Bearing Status:     RECOMMENDATION:    Out of Bed to Chair     DVT/Decubiti Prophylaxis    REHAB PLAN:     [  x  ] Bedside P/T 3-5 times a week   [  x  ]   Bedside O/T  2-3 times a week             [ x   ] Speech Therapy               [    ]  No Rehab Therapy Indicated   Conditioning/ROM                                    ADL  Bed Mobility                                               Conditioning/ROM  Transfers                                                     Bed Mobility  Sitting /Standing Balance                         Transfers                                        Gait Training                                               Sitting/Standing Balance  Stair Training [   ]Applicable                    Home equipment Eval                                                                        Splinting  [   ] Only      GOALS:   ADL   [  x  ]   Independent                    Transfers  [ x   ] Independent                          Ambulation  [  x  ] Independent     [  x   ] With device                            [    ]  CG                                                         [    ]  CG                                                                  [    ] CG                            [    ] Min A                                                   [    ] Min A                                                              [    ] Min  A          DISCHARGE PLAN:   [    ]  Good candidate for Intensive Rehabilitation/Hospital based                                             Will tolerate 3hrs Intensive Rehab Daily                                       [     ]  Short Term Rehab in Skilled Nursing Facility                                       [     ]  Home with Outpatient or VN services                                         [  x   ]  Possible Candidate for Intensive Hospital based Rehab

## 2025-05-30 NOTE — ED PROVIDER NOTE - OBJECTIVE STATEMENT
89-year-old male with history of HTN, HLD, dementia and right sided baseline weakness and sent in from assisted living facility for weakness, lethargy and favoring the right side.  Patient also has a right-sided facial droop noted at triage.  Patient is not answering questions and staff has not picked up the phone.

## 2025-05-30 NOTE — ED PROVIDER NOTE - PHYSICAL EXAMINATION
CONSTITUTIONAL: well-appearing, well nourished, non-toxic, NAD  HEAD: NCAT  EYES: EOMI, no scleral icterus  NECK Full ROM  CARD: RRR  EXT: Full ROM  NEURO: Right facial droop, right UE weakness

## 2025-05-30 NOTE — H&P ADULT - ATTENDING COMMENTS
Patient was seen on rounds with residents and/or ACPs.   Note reviewed and edited as needed based on independent history taking.  History: Left MCA syndrome with aphasia, left gaze and right lizbeth in setting of SBP 80. Now improved with SBP in 150s but exam still shows aphasia and right facial with RUE weakness.   acetaminophen     Tablet .. 650 milliGRAM(s) Oral once  aspirin  chewable 81 milliGRAM(s) Oral daily  atorvastatin 80 milliGRAM(s) Oral at bedtime  heparin   Injectable 5000 Unit(s) SubCutaneous every 12 hours  latanoprost 0.005% Ophthalmic Solution 1 Drop(s) Both EYES at bedtime  memantine 10 milliGRAM(s) Oral two times a day  multivitamin 1 Tablet(s) Oral daily  sodium chloride 0.9%. 1000 milliLiter(s) IV Continuous <Continuous>      T(C): 36.4 (05-30-25 @ 16:24), Max: 37.6 (05-30-25 @ 13:00)  HR: 76 (05-30-25 @ 19:55) (69 - 87)  BP: 143/79 (05-30-25 @ 19:55) (116/72 - 171/86)  RR: 18 (05-30-25 @ 19:55) (16 - 18)  SpO2: 98% (05-30-25 @ 19:55) (97% - 98%)  Exam  Results:                      13.2   10.10 )-----------( 316      ( 30 May 2025 13:15 )             40.4   05-30    139  |  102  |  22[H]  ----------------------------<  92  4.3   |  27  |  1.8[H]    Ca    9.5      30 May 2025 13:15    TPro  6.7  /  Alb  3.8  /  TBili  0.5  /  DBili  x   /  AST  94[H]  /  ALT  36  /  AlkPhos  145[H]  05-30    Imp: Left MCA syndrome  Left MCA stenosis  hypotension  Plan reviewed.   Plan - DAPT  Statin  MRI brain   TTE   Clinical condition and prognosis discussed with patient and/or family  time spent   80   minutes  I have spent above time on the encounter which excludes teaching time and/or separately reported services and includes taking history and exam and reviewing imaging and tests and establishing diagnosis and complex decision making for management.

## 2025-05-30 NOTE — H&P ADULT - NSHPPHYSICALEXAM_GEN_ALL_CORE
<<<<<NEURO EXAM>>>>>  General: Appearance is consistent with chronologic age. No abnormal facies.  Cognitive/Language: The patient is oriented to person, place, time and date. Recent and remote memory intact. Language with normal repetition, comprehension and naming. Nondysarthric.    Eyes: VFF. EOMI w/o nystagmus or reported double vision. PERRL. No ptosis/weakness of eyelid closure.    Face: Facial sensation normal V1 - 3, no facial asymmetry.    Ears/Nose/Throat: Hearing grossly intact b/l. Palate elevates midline. Tongue and uvula midline.   Motor examination: Normal tone. No tremors or involuntary movements.  Strength Exam (MRC scale): 5/5 BL UE and LE strength  Reflexes: 2+ b/l biceps, triceps, brachioradialis, patellar and Achilles reflexes. Plantar response downgoing b/l.  Sensory examination: Intact to light touch, temperature, and vibration in all extremities.  Cerebellum: FTN/HKS intact. No dysmetria or dysdiadochokinesia.    Gait: Deferred    NIHSS: <<<<<NEURO EXAM>>>>>  General: Appearance is consistent with chronologic age. No abnormal facies.  Cognitive/Language: The patient is oriented to person only at baseline, can't say month but knows age. Language with impaired repetition and intact comprehension and naming but decreased fluency. Mildly dysarthric.    Eyes: VFF. EOMI w/o nystagmus or reported double vision. Pinpoint pupils. No ptosis/weakness of eyelid closure.    Face: Facial sensation normal V1 - 3, slight R facial droop  Ears/Nose/Throat: Hearing grossly intact b/l. Palate elevates midline. Tongue and uvula midline.   Motor examination: Normal tone. No tremors or involuntary movements.  Strength Exam (MRC scale): 5/5 LUE and LLE strength, 4/5 RUE strength except for 3/5 R finger flexion and 1/5 R finger extension, 4+/5 RLE strength  Reflexes: 3+ R biceps, triceps, and brachioradialis reflexes, 2+ L biceps, triceps, and brachioradialis reflexes, 1+ b/l patellar and Achilles reflexes. Plantar response downgoing b/l.  Sensory examination: Intact to light touch, temperature, and vibration in all extremities. No extinction  Cerebellum: FTN/HKS intact. No dysmetria or dysdiadochokinesia.  Gait: Deferred due to recent fall, walks with a rolling walker at baseline    NIHSS: 4 (month 1, R facial droop 1, aphasia 1, dysarthria 1)

## 2025-05-30 NOTE — ED PROVIDER NOTE - ATTENDING CONTRIBUTION TO CARE
89-year-old male to ED from nursing home for evaluation of fall yesterday.  This morning also noted to have more of a facial droop so brought to the ED.  No injury or fall well-appearing nontoxic back to baseline stroke code activated on arrival initially noted to have a droop to the right side of the face along with some weakness of the right upper extremity.  NIH stroke scale was 6.  Stroke team at bedside

## 2025-05-31 ENCOUNTER — TRANSCRIPTION ENCOUNTER (OUTPATIENT)
Age: 89
End: 2025-05-31

## 2025-05-31 ENCOUNTER — RESULT REVIEW (OUTPATIENT)
Age: 89
End: 2025-05-31

## 2025-05-31 LAB
A1C WITH ESTIMATED AVERAGE GLUCOSE RESULT: 5.4 % — SIGNIFICANT CHANGE UP (ref 4–5.6)
ALBUMIN SERPL ELPH-MCNC: 3.6 G/DL — SIGNIFICANT CHANGE UP (ref 3.5–5.2)
ALP SERPL-CCNC: 138 U/L — HIGH (ref 30–115)
ALT FLD-CCNC: 35 U/L — SIGNIFICANT CHANGE UP (ref 0–41)
ANION GAP SERPL CALC-SCNC: 13 MMOL/L — SIGNIFICANT CHANGE UP (ref 7–14)
APTT BLD: 29.9 SEC — SIGNIFICANT CHANGE UP (ref 27–39.2)
AST SERPL-CCNC: 71 U/L — HIGH (ref 0–41)
BASOPHILS # BLD AUTO: 0.04 K/UL — SIGNIFICANT CHANGE UP (ref 0–0.2)
BASOPHILS NFR BLD AUTO: 0.5 % — SIGNIFICANT CHANGE UP (ref 0–1)
BILIRUB SERPL-MCNC: 0.6 MG/DL — SIGNIFICANT CHANGE UP (ref 0.2–1.2)
BUN SERPL-MCNC: 18 MG/DL — SIGNIFICANT CHANGE UP (ref 10–20)
CALCIUM SERPL-MCNC: 9.3 MG/DL — SIGNIFICANT CHANGE UP (ref 8.4–10.5)
CHLORIDE SERPL-SCNC: 101 MMOL/L — SIGNIFICANT CHANGE UP (ref 98–110)
CHOLEST SERPL-MCNC: 133 MG/DL — SIGNIFICANT CHANGE UP
CK SERPL-CCNC: 2207 U/L — HIGH (ref 0–225)
CO2 SERPL-SCNC: 23 MMOL/L — SIGNIFICANT CHANGE UP (ref 17–32)
CREAT SERPL-MCNC: 1.1 MG/DL — SIGNIFICANT CHANGE UP (ref 0.7–1.5)
CRP SERPL-MCNC: 40.4 MG/L — HIGH
EGFR: 63 ML/MIN/1.73M2 — SIGNIFICANT CHANGE UP
EGFR: 63 ML/MIN/1.73M2 — SIGNIFICANT CHANGE UP
EOSINOPHIL # BLD AUTO: 0.21 K/UL — SIGNIFICANT CHANGE UP (ref 0–0.7)
EOSINOPHIL NFR BLD AUTO: 2.8 % — SIGNIFICANT CHANGE UP (ref 0–8)
ERYTHROCYTE [SEDIMENTATION RATE] IN BLOOD: 48 MM/HR — HIGH (ref 0–15)
ESTIMATED AVERAGE GLUCOSE: 108 MG/DL — SIGNIFICANT CHANGE UP (ref 68–114)
GLUCOSE SERPL-MCNC: 91 MG/DL — SIGNIFICANT CHANGE UP (ref 70–99)
HCT VFR BLD CALC: 39.8 % — LOW (ref 42–52)
HDLC SERPL-MCNC: 46 MG/DL — SIGNIFICANT CHANGE UP
HGB BLD-MCNC: 13.1 G/DL — LOW (ref 14–18)
IMM GRANULOCYTES NFR BLD AUTO: 0.9 % — HIGH (ref 0.1–0.3)
INR BLD: 1 RATIO — SIGNIFICANT CHANGE UP (ref 0.65–1.3)
LDLC SERPL-MCNC: 71 MG/DL — SIGNIFICANT CHANGE UP
LIPID PNL WITH DIRECT LDL SERPL: 71 MG/DL — SIGNIFICANT CHANGE UP
LYMPHOCYTES # BLD AUTO: 1.59 K/UL — SIGNIFICANT CHANGE UP (ref 1.2–3.4)
LYMPHOCYTES # BLD AUTO: 21.1 % — SIGNIFICANT CHANGE UP (ref 20.5–51.1)
MAGNESIUM SERPL-MCNC: 2.3 MG/DL — SIGNIFICANT CHANGE UP (ref 1.8–2.4)
MCHC RBC-ENTMCNC: 30.6 PG — SIGNIFICANT CHANGE UP (ref 27–31)
MCHC RBC-ENTMCNC: 32.9 G/DL — SIGNIFICANT CHANGE UP (ref 32–37)
MCV RBC AUTO: 93 FL — SIGNIFICANT CHANGE UP (ref 80–94)
MONOCYTES # BLD AUTO: 0.8 K/UL — HIGH (ref 0.1–0.6)
MONOCYTES NFR BLD AUTO: 10.6 % — HIGH (ref 1.7–9.3)
NEUTROPHILS # BLD AUTO: 4.81 K/UL — SIGNIFICANT CHANGE UP (ref 1.4–6.5)
NEUTROPHILS NFR BLD AUTO: 64.1 % — SIGNIFICANT CHANGE UP (ref 42.2–75.2)
NONHDLC SERPL-MCNC: 87 MG/DL — SIGNIFICANT CHANGE UP
NRBC BLD AUTO-RTO: 0 /100 WBCS — SIGNIFICANT CHANGE UP (ref 0–0)
PHOSPHATE SERPL-MCNC: 3.8 MG/DL — SIGNIFICANT CHANGE UP (ref 2.1–4.9)
PLATELET # BLD AUTO: 280 K/UL — SIGNIFICANT CHANGE UP (ref 130–400)
PMV BLD: 9.7 FL — SIGNIFICANT CHANGE UP (ref 7.4–10.4)
POTASSIUM SERPL-MCNC: 4.6 MMOL/L — SIGNIFICANT CHANGE UP (ref 3.5–5)
POTASSIUM SERPL-SCNC: 4.6 MMOL/L — SIGNIFICANT CHANGE UP (ref 3.5–5)
PROT SERPL-MCNC: 6.3 G/DL — SIGNIFICANT CHANGE UP (ref 6–8)
PROTHROM AB SERPL-ACNC: 11.8 SEC — SIGNIFICANT CHANGE UP (ref 9.95–12.87)
RBC # BLD: 4.28 M/UL — LOW (ref 4.7–6.1)
RBC # FLD: 13.4 % — SIGNIFICANT CHANGE UP (ref 11.5–14.5)
SODIUM SERPL-SCNC: 137 MMOL/L — SIGNIFICANT CHANGE UP (ref 135–146)
TRIGL SERPL-MCNC: 81 MG/DL — SIGNIFICANT CHANGE UP
TROPONIN T, HIGH SENSITIVITY RESULT: 18 NG/L — SIGNIFICANT CHANGE UP (ref 6–21)
TSH SERPL-MCNC: 4.62 UIU/ML — HIGH (ref 0.27–4.2)
WBC # BLD: 7.52 K/UL — SIGNIFICANT CHANGE UP (ref 4.8–10.8)
WBC # FLD AUTO: 7.52 K/UL — SIGNIFICANT CHANGE UP (ref 4.8–10.8)

## 2025-05-31 PROCEDURE — 72141 MRI NECK SPINE W/O DYE: CPT | Mod: 26

## 2025-05-31 PROCEDURE — 99232 SBSQ HOSP IP/OBS MODERATE 35: CPT | Mod: GC

## 2025-05-31 PROCEDURE — 93306 TTE W/DOPPLER COMPLETE: CPT | Mod: 26

## 2025-05-31 PROCEDURE — 99222 1ST HOSP IP/OBS MODERATE 55: CPT

## 2025-05-31 PROCEDURE — 95819 EEG AWAKE AND ASLEEP: CPT | Mod: 26

## 2025-05-31 RX ORDER — LISINOPRIL 5 MG/1
10 TABLET ORAL DAILY
Refills: 0 | Status: DISCONTINUED | OUTPATIENT
Start: 2025-05-31 | End: 2025-06-03

## 2025-05-31 RX ADMIN — LATANOPROST PF 1 DROP(S): 0.05 SOLUTION/ DROPS OPHTHALMIC at 21:58

## 2025-05-31 RX ADMIN — Medication 75 MILLILITER(S): at 13:21

## 2025-05-31 RX ADMIN — Medication 150 MILLILITER(S): at 10:11

## 2025-05-31 RX ADMIN — HEPARIN SODIUM 5000 UNIT(S): 1000 INJECTION INTRAVENOUS; SUBCUTANEOUS at 17:07

## 2025-05-31 RX ADMIN — ATORVASTATIN CALCIUM 80 MILLIGRAM(S): 80 TABLET, FILM COATED ORAL at 21:58

## 2025-05-31 RX ADMIN — ESCITALOPRAM OXALATE 10 MILLIGRAM(S): 20 TABLET ORAL at 12:33

## 2025-05-31 RX ADMIN — MEMANTINE HYDROCHLORIDE 10 MILLIGRAM(S): 21 CAPSULE, EXTENDED RELEASE ORAL at 17:07

## 2025-05-31 RX ADMIN — HEPARIN SODIUM 5000 UNIT(S): 1000 INJECTION INTRAVENOUS; SUBCUTANEOUS at 06:31

## 2025-05-31 RX ADMIN — LISINOPRIL 10 MILLIGRAM(S): 5 TABLET ORAL at 14:15

## 2025-05-31 RX ADMIN — MEMANTINE HYDROCHLORIDE 10 MILLIGRAM(S): 21 CAPSULE, EXTENDED RELEASE ORAL at 06:32

## 2025-05-31 RX ADMIN — Medication 1 TABLET(S): at 12:33

## 2025-05-31 NOTE — PHYSICAL THERAPY INITIAL EVALUATION ADULT - RANGE OF MOTION EXAMINATION, REHAB EVAL
L UE WFL, R UE shoulder flex <1/2 range, elb/hand grossly WFL, R UE rigid at times, B LE hip/knee grossly WFL

## 2025-05-31 NOTE — PATIENT PROFILE ADULT - FUNCTIONAL ASSESSMENT - BASIC MOBILITY 6.
2-calculated by average/Not able to assess (calculate score using [facility] averaging method)

## 2025-05-31 NOTE — CHART NOTE - NSCHARTNOTEFT_GEN_A_CORE
Consult received and chart reviewed; palliative care will formally evaluate patient Monday AM, please call [phone] in the interim for any acute needs. Thank you.   ______________  Edy Bowles MD  Palliative Medicine  [facility]   [facility]  [phone]

## 2025-05-31 NOTE — PROGRESS NOTE ADULT - ASSESSMENT
Patient is a 89 yo M w/PMH of HTN, HLD, dementia (AxO1 at baseline) and right arm baseline weakness for last 6 months with increased falls over the same time period who presented to the ED for weakness, lethargy, R facial droop, and worsened R side weakness with NIHSS initially 15 but improving to NIHSS 4 after 500 cc IVF bolus with CTH, CTP, and CTA H&N largely unremarkable with possible L MCA stenosis now admitted for work up and management of possible L MCA territory stroke.    #Improving aphasia, R facial droop, R-sided weakness  #Possible L MCA territory stroke  #Rule out seizure  - Admit to Stroke Unit and Tele monitoring  - Neurochecks q4 hrs  - SBP goal 120-180  - Fall and Aspiration precautions  - Provide stroke education  - Physical therapy/Occupational therapy/Speech and Swallow/Physiatry eval  - F/u HbA1c, TSH and Lipid panel  - F/u MRI Brain Non-con  - F/u TTE w/bubble  - F/u rEEG read  - Consider MRI C-spine noncon for chronic RUE weakness considering patient has RUE hyperreflexia, if patient has negative brain MRI  - Start Aspirin 81mg and Plavix 75mg daily  - Start Atorvastatin 80mg daily    #IGNACIO  - s/p 500 cc bolus of LR  - c/w NS 60 cc/hr    #HTN  - HOLDING Home lisinopril 10 mg daily    #HLD  - On home atorvastatin 40 mg nightly  - start atorvastatin 80 mg nightly    #Dementia  #Agitation  - c/w Lexapro 10 mg daily  - c/w memantine 10 mg bid    #Glaucoma  - latanoprost 1 drop at bedtime in both eyes    #Misc:  - DVT Prophylaxis: Heparin SQ  - Diet: Soft and bite sized, DASH  - Dispo: Stroke  Patient is a 89 yo M w/PMH of HTN, HLD, dementia (AxO1 at baseline) and right arm baseline weakness for last 6 months with increased falls over the same time period who presented to the ED for weakness, lethargy, R facial droop, and worsened R side weakness with NIHSS initially 15 but improving to NIHSS 4 after 500 cc IVF bolus with CTH, CTP, and CTA H&N largely unremarkable with possible L MCA stenosis now admitted for work up and management of possible L MCA territory stroke. MRH confirmed no stroke. Considering hypotension on presentation suspect initial symptoms secondary to hypotension secondary to dehydration because patient had IGNACIO on initial labs vs seizure. Patient now appears at reported baseline.    #weakness lethargy and R facial droop possibly due to initial hypotension possibly secondary to dehydration   - Admit to Stroke Unit and Tele monitoring  - Neurochecks q4 hrs  - SBP goal 120-180  - Fall and Aspiration precautions  - Provide stroke education  - Physical therapy/Occupational therapy/Speech and Swallow/Physiatry eval  - F/u HbA1c, TSH and Lipid panel  - F/u TTE w/bubble  - F/u rEEG read  - Consider MRI C-spine noncon for chronic RUE weakness considering patient has RUE hyperreflexia, if patient has negative brain MRI  - Start Aspirin 81mg and Plavix 75mg daily  - Start Atorvastatin 80mg daily    #IGNACIO  - s/p 500 cc bolus of LR  - c/w NS 60 cc/hr    #HTN  - HOLDING Home lisinopril 10 mg daily    #HLD  - On home atorvastatin 40 mg nightly  - start atorvastatin 80 mg nightly    #Dementia  #Agitation  - c/w Lexapro 10 mg daily  - c/w memantine 10 mg bid    #Glaucoma  - latanoprost 1 drop at bedtime in both eyes    #Misc:  - DVT Prophylaxis: Heparin SQ  - Diet: Soft and bite sized, DASH  - Dispo: Stroke > likely downgrade

## 2025-05-31 NOTE — EEG REPORT - NS EEG TEXT BOX
[facility] Department of Neurology         Inpatient Routine-Electroencephalography Report    Patient Name:	MICHELLE SALINAS    :	1932  MRN:	-    Study Start Date/Time:	2025, 12:04:49 AM    End Time (if applicable):      Brief Clinical History:  MICHELLE SALINAS is a 89 year old Male; study performed to investigate for seizures or markers of epilepsy.   Technologist notes: DEMENTIA,WEAKNESS,FACIAL DROOP,HEART FILURE,ALZEIMER DISEASE POSSIBLE SEIZURE    Diagnosis Code:  R56.9 convulsions/seizure  CPT:   91789 (sleep)     Pertinent Medication:  n/a    Acquisition Details:  Electroencephalography was acquired using a minimum of 21 channels on an [employer] system v 9.3.1 with electrode placement according to the standard International 10-20 system following ACNS (American Clinical Neurophysiology Society) guidelines.  Anterior temporal T1 and T2 electrodes were utilized whenever possible.  The [employer] automated spike & seizure detections were all reviewed in detail, in addition to the entire raw EEG.    Findings:  Background:  continuous, with predominantly theta frequencies.  Generalized Slowing:  Continuous polymorphic theta   Symmetry/Focality: No persistent asymmetries of voltage or frequency.     Voltage:  Normal (20+ uV)  Organization:  Appropriate anterior-posterior gradient  Posterior Dominant Rhythm:  No clear PDR sy  Sleep:  Symmetric, synchronous spindles and K complexes.  Variability:   Yes		Reactivity:  Yes    Spontaneous Activity:  No epileptiform discharges   Events:  1)	No electrographic seizures or significant clinical events occurred during this study.  Provocations:  •	Hyperventilation: was not performed.  •	Photic stimulation: was not performed.  FINAL Impression:  Abnormal  sleep routine EEG  1)	 There was diffuse slowing of electrographic activity      Final Clinical Correlation:  1)	Findings consistent with diffuse electrocerebral dysfunction secondary to nonspecific etiology          Shima Anna MD   Attending Neurologist, [facility]

## 2025-05-31 NOTE — PHYSICAL THERAPY INITIAL EVALUATION ADULT - PERTINENT HX OF CURRENT PROBLEM, REHAB EVAL
pt adm for weakness, lethargy, hypotension, stoke w/u, h/o dementia (A&Ox1), h/o R UE weakness x 6 months with increased falls

## 2025-05-31 NOTE — PROGRESS NOTE ADULT - NS ATTEST RISK PROBLEM GEN_ALL_CORE FT
IGNACIO- trend Cr, monitor UO, c/w IVF  nontraumatic rhabdo- c/w IVF; trend CPK ; repeat electrolytes  R sided weakness- MR negative for acute stroke; EEG without seizure  HTN- resume home antihypertensives as hemodynamics tolerate  hld- hold statin given rhabdo  PT/OT/speech

## 2025-05-31 NOTE — OCCUPATIONAL THERAPY INITIAL EVALUATION ADULT - PERTINENT HX OF CURRENT PROBLEM, REHAB EVAL
89 yo M w/PMH of HTN, HLD, dementia (AxO1 at baseline) and right arm baseline weakness for last 6 months with increased falls over the same time period who presented to the ED for weakness, lethargy, R facial droop, and worsened R side weakness with NIHSS initially 15 but improving to NIHSS 4 after 500 cc IVF bolus with CTH, CTP, and CTA H&N largely unremarkable with possible L MCA stenosis now admitted for work up and management of possible L MCA territory stroke. MRH confirmed no stroke. Considering hypotension on presentation suspect initial symptoms secondary to hypotension secondary to dehydration because patient had IGNACIO on initial labs vs seizure. Patient now appears at reported baseline.    #weakness lethargy and R facial droop possibly due to initial hypotension possibly secondary to dehydration Burow's Graft Text: The defect edges were debeveled with a #15 scalpel blade.  Given the location of the defect, shape of the defect, the proximity to free margins and the presence of a standing cone deformity a Burow's skin graft was deemed most appropriate. The standing cone was removed and this tissue was then trimmed to the shape of the primary defect. The adipose tissue was also removed until only dermis and epidermis were left.  The skin margins of the secondary defect were undermined to an appropriate distance in all directions utilizing iris scissors.  The secondary defect was closed with interrupted buried subcutaneous sutures.  The skin edges were then re-apposed with running  sutures.  The skin graft was then placed in the primary defect and oriented appropriately.

## 2025-05-31 NOTE — DISCHARGE NOTE PROVIDER - CARE PROVIDERS DIRECT ADDRESSES
,[email or web address]

## 2025-05-31 NOTE — SWALLOW BEDSIDE ASSESSMENT ADULT - SLP PERTINENT HISTORY OF CURRENT PROBLEM
Patient is a 89 yo M w/PMH of HTN, HLD, dementia (AxO1 at baseline) and right arm baseline weakness for last 6 months with increased falls over the same time period who presented to the ED for weakness, lethargy, R facial droop, and worsened R side weakness with NIHSS initially 15 but improving to NIHSS 4 after 500 cc IVF bolus with CTH, CTP, and CTA H&N largely unremarkable with possible L MCA stenosis now admitted for work up and management of possible L MCA territory stroke. MRH confirmed no stroke. Considering hypotension on presentation suspect initial symptoms secondary to hypotension secondary to dehydration because patient had IGNACIO on initial labs vs seizure. Patient now appears at reported baseline.

## 2025-05-31 NOTE — PHYSICAL THERAPY INITIAL EVALUATION ADULT - PHYSICAL ASSIST/NONPHYSICAL ASSIST: SCOOT/BRIDGE, REHAB EVAL
with RN/verbal cues/2 person assist
Quality 226: Preventive Care And Screening: Tobacco Use: Screening And Cessation Intervention: Patient screened for tobacco use and is an ex/non-smoker
Detail Level: Zone

## 2025-05-31 NOTE — OCCUPATIONAL THERAPY INITIAL EVALUATION ADULT - NS ASR FOLLOW COMMAND OT EVAL
decreased command following when overstimulated; impaired word finding - son believes close to baseline but unsure/75% of the time/able to follow single-step instructions

## 2025-05-31 NOTE — DISCHARGE NOTE PROVIDER - PROVIDER TOKENS
PROVIDER:[TOKEN:[[id number]],FOLLOWUP:[2 weeks]],PROVIDER:[TOKEN:[[id number]],FOLLOWUP:[2 weeks]] PROVIDER:[TOKEN:[[id number]],FOLLOWUP:[2 weeks]],PROVIDER:[TOKEN:[[id number]],FOLLOWUP:[2 weeks]]

## 2025-05-31 NOTE — PROGRESS NOTE ADULT - SUBJECTIVE AND OBJECTIVE BOX
Neurology Progress Note    Interval History:     Medications:  aspirin  chewable 81 milliGRAM(s) Oral daily  atorvastatin 80 milliGRAM(s) Oral at bedtime  clopidogrel Tablet 75 milliGRAM(s) Oral daily  escitalopram 10 milliGRAM(s) Oral daily  heparin   Injectable 5000 Unit(s) SubCutaneous every 12 hours  latanoprost 0.005% Ophthalmic Solution 1 Drop(s) Both EYES at bedtime  memantine 10 milliGRAM(s) Oral two times a day  multivitamin 1 Tablet(s) Oral daily  sodium chloride 0.9%. 1000 milliLiter(s) IV Continuous <Continuous>    Vital Signs Last 24 Hrs  T(C): 35.7 (31 May 2025 04:00), Max: 37.6 (30 May 2025 13:00)  T(F): 96.3 (31 May 2025 04:00), Max: 99.6 (30 May 2025 13:00)  HR: 64 (31 May 2025 04:00) (64 - 87)  BP: 139/70 (31 May 2025 04:00) (116/72 - 171/86)  BP(mean): 103 (31 May 2025 04:00) (100 - 109)  RR: 18 (31 May 2025 04:00) (16 - 18)  SpO2: 99% (31 May 2025 04:00) (95% - 99%)    Parameters below as of 31 May 2025 04:00  Patient On (Oxygen Delivery Method): room air      Physical Exam  GENERAL: well-developed, well-nourished, in no acute distress    NEUROLOGIC:  Mental status: AOx  Language: Speech with intact fluency , naming , repetition , comprehension, absent dysarthria  Cranial nerves:   II: Visual fields are full to confrontation  III, IV, VI: Extraocular movements intact, no noticeable nystagmus, pupils equally round and reactive to light  V:  Facial sensation V1-V3 equal and intact   VII: Face is symmetric with normal eye closure and smile  VIII: Hearing is intact bilaterally intact  IX, X: Uvula is midline and soft palate rises symmetrically  XI: Head turning and shoulder shrug are intact and symmetric  XII: Tongue protrudes midline  Motor:  - shoulder abduction     R 5/5     L 5/5  - elbow flexion     R 5/5     L 5/5  - elbow extension     R 5/5     L 5/5  - hand      R 5/5     L 5/5   - wrist flexion     R 5/5     L 5/5  - wrist extension     R 5/5     L 5/5  - finger flexion     R 5/5     L 5/5  - finger extension     R 5/5     L 5/5  - finger abduction     R 5/5     L 5/5  - hip flexion     R 5/5     L 5/5  - knee flexion     R 5/5     L 5/5  - knee extension     R 5/5     L 5/5  - dorsiflexion     R 5/5     L 5/5  - plantarflexion     R 5/5     L 5/5  - pronator drift     absent / present L / R  Sensation: Intact to light touch bilaterally. No neglect or extinction on double simultaneous testing.  Coordination: No dysmetria on finger-to-nose and heel-to-shin bilaterally, rapid alternating movements intact and symmetric  Reflexes:   - biceps     R 2+     L 2+  - triceps     R 2+     L 2+  - brachioradialis     R 2+     L 2+  - patellar     R 2+     L 2+  - Achilles     R 2+     L 2+  - Babinski     R downgoing / upgoing    L downgoing / upgoing  - Olivier     R absent / present     L absent / present  Gait: Narrow gait and steady, Romberg sign negative / Deferred at this encounter    NIHSS:   - Level of consciousness:   - Ask month and age:   - 'Blink eyes' & 'squeeze hands':   - Horizontal extraocular movements:   - Visual fields:   - Facial palsy:   - Left arm motor drift:   - Right arm motor drift:   - Left leg motor drift:   - Right leg motor drift:   - Limb Ataxia:   - Sensation:   - Language/aphasia:   - Dysarthria:   - Extinction/inattention:     Labs:  CBC Full  -  ( 30 May 2025 13:15 )  WBC Count : 10.10 K/uL  RBC Count : 4.35 M/uL  Hemoglobin : 13.2 g/dL  Hematocrit : 40.4 %  Platelet Count - Automated : 316 K/uL  Mean Cell Volume : 92.9 fL  Mean Cell Hemoglobin : 30.3 pg  Mean Cell Hemoglobin Concentration : 32.7 g/dL  Auto Neutrophil # : x  Auto Lymphocyte # : x  Auto Monocyte # : x  Auto Eosinophil # : x  Auto Basophil # : x  Auto Neutrophil % : x  Auto Lymphocyte % : x  Auto Monocyte % : x  Auto Eosinophil % : x  Auto Basophil % : x    05-30    139  |  102  |  22[H]  ----------------------------<  92  4.3   |  27  |  1.8[H]    Ca    9.5      30 May 2025 13:15    TPro  6.7  /  Alb  3.8  /  TBili  0.5  /  DBili  x   /  AST  94[H]  /  ALT  36  /  AlkPhos  145[H]  05-30    LIVER FUNCTIONS - ( 30 May 2025 13:15 )  Alb: 3.8 g/dL / Pro: 6.7 g/dL / ALK PHOS: 145 U/L / ALT: 36 U/L / AST: 94 U/L / GGT: x           PT/INR - ( 30 May 2025 13:15 )   PT: 11.40 sec;   INR: 0.97 ratio         PTT - ( 30 May 2025 13:15 )  PTT:27.4 sec  Urinalysis Basic - ( 30 May 2025 15:00 )    Color: Yellow / Appearance: Clear / SG: >1.030 / pH: x  Gluc: x / Ketone: x  / Bili: Negative / Urobili: 1.0 mg/dL   Blood: x / Protein: 30 mg/dL / Nitrite: Negative   Leuk Esterase: Negative / RBC: 4 /HPF / WBC 1 /HPF   Sq Epi: x / Non Sq Epi: 1 /HPF / Bacteria: Negative /HPF     Neurology Progress Note    Interval History: patient apahasic so unable to communicate needs however appears comfortable    Medications:  aspirin  chewable 81 milliGRAM(s) Oral daily  atorvastatin 80 milliGRAM(s) Oral at bedtime  clopidogrel Tablet 75 milliGRAM(s) Oral daily  escitalopram 10 milliGRAM(s) Oral daily  heparin   Injectable 5000 Unit(s) SubCutaneous every 12 hours  latanoprost 0.005% Ophthalmic Solution 1 Drop(s) Both EYES at bedtime  memantine 10 milliGRAM(s) Oral two times a day  multivitamin 1 Tablet(s) Oral daily  sodium chloride 0.9%. 1000 milliLiter(s) IV Continuous <Continuous>    Vital Signs Last 24 Hrs  T(C): 35.7 (31 May 2025 04:00), Max: 37.6 (30 May 2025 13:00)  T(F): 96.3 (31 May 2025 04:00), Max: 99.6 (30 May 2025 13:00)  HR: 64 (31 May 2025 04:00) (64 - 87)  BP: 139/70 (31 May 2025 04:00) (116/72 - 171/86)  BP(mean): 103 (31 May 2025 04:00) (100 - 109)  RR: 18 (31 May 2025 04:00) (16 - 18)  SpO2: 99% (31 May 2025 04:00) (95% - 99%)    Parameters below as of 31 May 2025 04:00  Patient On (Oxygen Delivery Method): room air      Physical Exam  General: Appearance is consistent with chronologic age. No abnormal facies.  Cognitive/Language: The patient is oriented to person only at baseline, can't say month, age, year, location. impaired naming, intact repetition and some commands (unable to do cross-midline commands)  Eyes: VFF. EOMI w/o nystagmus or reported double vision. Pinpoint pupils. No ptosis/weakness of eyelid closure.    Face: Facial sensation normal V1 - 3, face symmetrical  Ears/Nose/Throat: Hearing grossly intact b/l. Palate elevates midline. Tongue and uvula midline.   Motor examination: Normal tone. No tremors or involuntary movements.  Strength Exam (MRC scale): 5/5 LUE strength bilateral   Reflexes: 2+ R biceps, triceps, and brachioradialis reflexes, 2+ L biceps, triceps, and brachioradialis reflexes, 1+ b/l patellar and Achilles reflexes. Plantar response downgoing b/l.  Sensory examination: Intact to light touch, temperature, and vibration in all extremities. No extinction  Cerebellum: FTN/HKS intact. No dysmetria or dysdiadochokinesia.  Gait: Deferred due to recent fall, walks with a rolling walker at baseline    Labs:  CBC Full  -  ( 30 May 2025 13:15 )  WBC Count : 10.10 K/uL  RBC Count : 4.35 M/uL  Hemoglobin : 13.2 g/dL  Hematocrit : 40.4 %  Platelet Count - Automated : 316 K/uL  Mean Cell Volume : 92.9 fL  Mean Cell Hemoglobin : 30.3 pg  Mean Cell Hemoglobin Concentration : 32.7 g/dL  Auto Neutrophil # : x  Auto Lymphocyte # : x  Auto Monocyte # : x  Auto Eosinophil # : x  Auto Basophil # : x  Auto Neutrophil % : x  Auto Lymphocyte % : x  Auto Monocyte % : x  Auto Eosinophil % : x  Auto Basophil % : x    05-30    139  |  102  |  22[H]  ----------------------------<  92  4.3   |  27  |  1.8[H]    Ca    9.5      30 May 2025 13:15    TPro  6.7  /  Alb  3.8  /  TBili  0.5  /  DBili  x   /  AST  94[H]  /  ALT  36  /  AlkPhos  145[H]  05-30    LIVER FUNCTIONS - ( 30 May 2025 13:15 )  Alb: 3.8 g/dL / Pro: 6.7 g/dL / ALK PHOS: 145 U/L / ALT: 36 U/L / AST: 94 U/L / GGT: x           PT/INR - ( 30 May 2025 13:15 )   PT: 11.40 sec;   INR: 0.97 ratio         PTT - ( 30 May 2025 13:15 )  PTT:27.4 sec  Urinalysis Basic - ( 30 May 2025 15:00 )    Color: Yellow / Appearance: Clear / SG: >1.030 / pH: x  Gluc: x / Ketone: x  / Bili: Negative / Urobili: 1.0 mg/dL   Blood: x / Protein: 30 mg/dL / Nitrite: Negative   Leuk Esterase: Negative / RBC: 4 /HPF / WBC 1 /HPF   Sq Epi: x / Non Sq Epi: 1 /HPF / Bacteria: Negative /HPF     Neurology Progress Note    Interval History: patient apahasic so unable to communicate needs however appears comfortable    Medications:  aspirin  chewable 81 milliGRAM(s) Oral daily  atorvastatin 80 milliGRAM(s) Oral at bedtime  clopidogrel Tablet 75 milliGRAM(s) Oral daily  escitalopram 10 milliGRAM(s) Oral daily  heparin   Injectable 5000 Unit(s) SubCutaneous every 12 hours  latanoprost 0.005% Ophthalmic Solution 1 Drop(s) Both EYES at bedtime  memantine 10 milliGRAM(s) Oral two times a day  multivitamin 1 Tablet(s) Oral daily  sodium chloride 0.9%. 1000 milliLiter(s) IV Continuous <Continuous>    Vital Signs Last 24 Hrs  T(C): 35.7 (31 May 2025 04:00), Max: 37.6 (30 May 2025 13:00)  T(F): 96.3 (31 May 2025 04:00), Max: 99.6 (30 May 2025 13:00)  HR: 64 (31 May 2025 04:00) (64 - 87)  BP: 139/70 (31 May 2025 04:00) (116/72 - 171/86)  BP(mean): 103 (31 May 2025 04:00) (100 - 109)  RR: 18 (31 May 2025 04:00) (16 - 18)  SpO2: 99% (31 May 2025 04:00) (95% - 99%)    Parameters below as of 31 May 2025 04:00  Patient On (Oxygen Delivery Method): room air      Physical Exam  General: Appearance is consistent with chronologic age. No abnormal facies.  Cognitive/Language: The patient is oriented to person only at baseline, can't say month, age, year, location. impaired naming, intact repetition and some commands (unable to do cross-midline commands)  Eyes: VFF. EOMI w/o nystagmus or reported double vision. Pinpoint pupils. No ptosis/weakness of eyelid closure.    Face: Facial sensation normal V1 - 3, face symmetrical  Ears/Nose/Throat: Hearing grossly intact b/l. Palate elevates midline. Tongue and uvula midline.   Motor examination: Normal tone. No tremors or involuntary movements.  Strength Exam (MRC scale): RUE 4+/5 rest of extremities 5/5  Reflexes: 2+ R biceps, triceps, and brachioradialis reflexes, 2+ L biceps, triceps, and brachioradialis reflexes, 1+ b/l patellar and Achilles reflexes. Plantar response downgoing b/l.  Sensory examination: Intact to light touch, temperature, and vibration in all extremities. No extinction  Cerebellum: FTN/HKS intact. No dysmetria or dysdiadochokinesia.  Gait: Deferred due to recent fall, walks with a rolling walker at baseline    Labs:  CBC Full  -  ( 30 May 2025 13:15 )  WBC Count : 10.10 K/uL  RBC Count : 4.35 M/uL  Hemoglobin : 13.2 g/dL  Hematocrit : 40.4 %  Platelet Count - Automated : 316 K/uL  Mean Cell Volume : 92.9 fL  Mean Cell Hemoglobin : 30.3 pg  Mean Cell Hemoglobin Concentration : 32.7 g/dL  Auto Neutrophil # : x  Auto Lymphocyte # : x  Auto Monocyte # : x  Auto Eosinophil # : x  Auto Basophil # : x  Auto Neutrophil % : x  Auto Lymphocyte % : x  Auto Monocyte % : x  Auto Eosinophil % : x  Auto Basophil % : x    05-30    139  |  102  |  22[H]  ----------------------------<  92  4.3   |  27  |  1.8[H]    Ca    9.5      30 May 2025 13:15    TPro  6.7  /  Alb  3.8  /  TBili  0.5  /  DBili  x   /  AST  94[H]  /  ALT  36  /  AlkPhos  145[H]  05-30    LIVER FUNCTIONS - ( 30 May 2025 13:15 )  Alb: 3.8 g/dL / Pro: 6.7 g/dL / ALK PHOS: 145 U/L / ALT: 36 U/L / AST: 94 U/L / GGT: x           PT/INR - ( 30 May 2025 13:15 )   PT: 11.40 sec;   INR: 0.97 ratio         PTT - ( 30 May 2025 13:15 )  PTT:27.4 sec  Urinalysis Basic - ( 30 May 2025 15:00 )    Color: Yellow / Appearance: Clear / SG: >1.030 / pH: x  Gluc: x / Ketone: x  / Bili: Negative / Urobili: 1.0 mg/dL   Blood: x / Protein: 30 mg/dL / Nitrite: Negative   Leuk Esterase: Negative / RBC: 4 /HPF / WBC 1 /HPF   Sq Epi: x / Non Sq Epi: 1 /HPF / Bacteria: Negative /HPF

## 2025-05-31 NOTE — DISCHARGE NOTE PROVIDER - HOSPITAL COURSE
Hospital course:  Patient is a 89 yo M w/PMH of HTN, HLD, dementia (AxO1 at baseline) and right arm baseline weakness for last 6 months with increased falls over the same time period who presented to the ED for weakness, lethargy, R facial droop, and favoring the right side this morning. Patient was notably found on the floor last night at 11 pm. As per patient's son, patient has been falling more frequently over the last 6 months and walks with a rolling walker at baseline. His BP was 84/52 and HR was 240 while in the nursing home prior to coming to the ED, with triage /72 and HR 87. His initial NIHSS during the stroke code was 15, with NIHSS improving after being given IVF boluses. His CTH showed no large territory stroke or hemorrhage, CTP showed no perfusion deficit, and CTA H&N showed R proximal ICA mild stenosis and possible L MCA mild-moderate stenosis but otherwise no other stenosis, LVO, or aneurysm.     When I assessed the patient, NIHSS had improved to 4, close to the patient's baseline as per patient's son. He reports that in March, his father came to the hospital with a very similar presentation of severe weakness and aphasia after sustaining a fall with quick resolution of his symptoms like this time.    Patient's son denies any history of stroke or seizure in his father. He also denies any smoking, drinking, or substance use.    MRH confirmed no stroke. Considering hypotension on presentation suspect initial symptoms secondary to hypotension secondary to dehydration because patient had IGNACIO on initial labs vs seizure. Patient now appears at reported baseline.    Patient had the following workup done in house:  [] CTH:  No acute intracranial pathology.  []CT PERFUSION:  No perfusion deficits to suggest areas of completed infarction or at risk   territory.  [] CTA HEAD/NECK:  No large vessel occlusion, aneurysm, or vascular malformation. Stable focal mild atherosclerotic stenosis in the proximal right cervical ICA (40%).  [] MRI brain w/out:   No evidence of acute intracranial pathology. No acute infarct, intracranial hemorrhage, mass effect or midline shift.  Mild chronic microvascular type changes.  Atrophy of the anterior temporal lobes.  [] TTE with bubble  [] LALO  [] CXR  Low lung volume.  Bilateral opacities    Physical exam at discharge:  General: Appearance is consistent with chronologic age. No abnormal facies.  Cognitive/Language: The patient is oriented to person only at baseline, can't say month, age, year, location. impaired naming, intact repetition and some commands (unable to do cross-midline commands)  Eyes: VFF. EOMI w/o nystagmus or reported double vision. Pinpoint pupils. No ptosis/weakness of eyelid closure.    Face: Facial sensation normal V1 - 3, face symmetrical  Ears/Nose/Throat: Hearing grossly intact b/l. Palate elevates midline. Tongue and uvula midline.   Motor examination: Normal tone. No tremors or involuntary movements.  Strength Exam (MRC scale): RUE 4+/5 rest of extremities 5/5  Reflexes: 2+ R biceps, triceps, and brachioradialis reflexes, 2+ L biceps, triceps, and brachioradialis reflexes, 1+ b/l patellar and Achilles reflexes. Plantar response downgoing b/l.  Sensory examination: Intact to light touch, temperature, and vibration in all extremities. No extinction  Cerebellum: FTN/HKS intact. No dysmetria or dysdiadochokinesia.  Gait: Deferred due to recent fall, walks with a rolling walker at baseline    Medication changes on discharge:   Labs to be followed up on after discharge:   Imaging to be done after discharge:   Further workup after discharge: follow up neurology  , primary care provider ,    Hospital course:  Patient is a 89 yo M w/PMH of HTN, HLD, dementia (AxO1 at baseline) and right arm baseline weakness for last 6 months with increased falls over the same time period who presented to the ED for weakness, lethargy, R facial droop, and favoring the right side this morning. Patient was notably found on the floor last night at 11 pm. As per patient's son, patient has been falling more frequently over the last 6 months and walks with a rolling walker at baseline. His BP was 84/52 and HR was 240 while in the nursing home prior to coming to the ED, with triage /72 and HR 87. His initial NIHSS during the stroke code was 15, with NIHSS improving after being given IVF boluses. His CTH showed no large territory stroke or hemorrhage, CTP showed no perfusion deficit, and CTA H&N showed R proximal ICA mild stenosis and possible L MCA mild-moderate stenosis but otherwise no other stenosis, LVO, or aneurysm.     When I assessed the patient, NIHSS had improved to 4, close to the patient's baseline as per patient's son. He reports that in March, his father came to the hospital with a very similar presentation of severe weakness and aphasia after sustaining a fall with quick resolution of his symptoms like this time.    Patient's son denies any history of stroke or seizure in his father. He also denies any smoking, drinking, or substance use.    MRH confirmed no stroke. rEEG shows no signs of seizure. Considering hypotension on presentation suspect initial symptoms secondary to hypotension secondary to dehydration because patient had IGNACIO on initial labs vs seizure. Patient now appears at reported baseline.    Discharge diagnosis:  TIA likely 2/2 hypotension from dehydration vs seizure    Patient had the following workup done in house:  [x] CTH: No acute intracranial pathology.  [x] CT PERFUSION: No perfusion deficits  [x] CTA HEAD/NECK: No LVO, aneurysm, or AVM, mild proximal R ICA stenosis (40%)  [x] MRI brain w/out: No acute infarct.  Mild microvascular disease. Atrophy of the anterior temporal lobes.  [x] TTE: Normal left atrial size, LVEF 63%,   [x] CXR: Bilateral opacities  [x] rEEG: No signs of seizure or epileptiform activity    CORE MEASURES:  [5.4] A1c [71] LDL [4.62]TSH      Physical exam at discharge:  General: Appearance is consistent with chronologic age. No abnormal facies.  Cognitive/Language: The patient is oriented to person only at baseline, can't say month, age, year, location. impaired naming, intact repetition and some commands (unable to do cross-midline commands)  Eyes: VFF. EOMI w/o nystagmus or reported double vision. Pinpoint pupils. No ptosis/weakness of eyelid closure.    Face: Facial sensation normal V1 - 3, face symmetrical  Ears/Nose/Throat: Hearing grossly intact b/l. Palate elevates midline. Tongue and uvula midline.   Motor examination: Normal tone. No tremors or involuntary movements.  Strength Exam (MRC scale): RUE 4+/5 rest of extremities 5/5  Reflexes: 2+ R biceps, triceps, and brachioradialis reflexes, 2+ L biceps, triceps, and brachioradialis reflexes, 1+ b/l patellar and Achilles reflexes. Plantar response downgoing b/l.  Sensory examination: Intact to light touch, temperature, and vibration in all extremities. No extinction  Cerebellum: FTN/HKS intact. No dysmetria or dysdiadochokinesia.  Gait: Deferred due to recent fall, walks with a rolling walker at baseline    Medication changes on discharge: Aspirin 81 mg daily and clopidogrel 75 mg daily for total of 21 days (last date 6/19), then aspirin 81 mg daily only, atorvastatin 10 mg nightly, miralax and senna prn for constipation  Labs to be followed up on after discharge: None  Imaging to be done after discharge: None  Further workup after discharge: f/u with stroke clinic within 2 weeks, f/u w/primary care provider within 2-4 weeks   Hospital course:  Patient is a 89 yo M w/PMH of HTN, HLD, dementia (AxO1 at baseline) and right arm baseline weakness for last 6 months with increased falls over the same time period who presented to the ED for weakness, lethargy, R facial droop, and favoring the right side this morning. Patient was notably found on the floor last night at 11 pm. As per patient's son, patient has been falling more frequently over the last 6 months and walks with a rolling walker at baseline. His BP was 84/52 and HR was 240 while in the nursing home prior to coming to the ED, with triage /72 and HR 87. His initial NIHSS during the stroke code was 15, with NIHSS improving after being given IVF boluses. His CTH showed no large territory stroke or hemorrhage, CTP showed no perfusion deficit, and CTA H&N showed R proximal ICA mild stenosis and possible L MCA mild-moderate stenosis but otherwise no other stenosis, LVO, or aneurysm.     When I assessed the patient, NIHSS had improved to 4, close to the patient's baseline as per patient's son. He reports that in March, his father came to the hospital with a very similar presentation of severe weakness and aphasia after sustaining a fall with quick resolution of his symptoms like this time.    Patient's son denies any history of stroke or seizure in his father. He also denies any smoking, drinking, or substance use.    MRH confirmed no stroke. rEEG shows no signs of seizure. Considering hypotension on presentation suspect initial symptoms secondary to hypotension secondary to dehydration because patient had IGNACIO on initial labs vs seizure. Patient now appears at reported baseline.    Discharge diagnosis:  TIA likely 2/2 hypotension from dehydration vs seizure    Patient had the following workup done in house:  [x] CTH: No acute intracranial pathology.  [x] CT PERFUSION: No perfusion deficits  [x] CTA HEAD/NECK: No LVO, aneurysm, or AVM, mild proximal R ICA stenosis (40%)  [x] MRI brain w/out: No acute infarct.  Mild microvascular disease. Atrophy of the anterior temporal lobes.  [x] TTE: Normal left atrial size, LVEF 63%,   [x] CXR: Bilateral opacities  [x] rEEG: No signs of seizure or epileptiform activity    CORE MEASURES:  [5.4] A1c [71] LDL [4.62]TSH      Physical exam at discharge:  General: Appearance is consistent with chronologic age. No abnormal facies.  Cognitive/Language: The patient is oriented to person only at baseline, can't say month, age, year, location. impaired naming, intact repetition and some commands (unable to do cross-midline commands)  Eyes: VFF. EOMI w/o nystagmus or reported double vision. Pinpoint pupils. No ptosis/weakness of eyelid closure.    Face: Facial sensation normal V1 - 3, face symmetrical  Ears/Nose/Throat: Hearing grossly intact b/l. Palate elevates midline. Tongue and uvula midline.   Motor examination: Normal tone. No tremors or involuntary movements.  Strength Exam (MRC scale): RUE 4+/5 rest of extremities 5/5  Reflexes: 2+ R biceps, triceps, and brachioradialis reflexes, 2+ L biceps, triceps, and brachioradialis reflexes, 1+ b/l patellar and Achilles reflexes. Plantar response downgoing b/l.  Sensory examination: Intact to light touch, temperature, and vibration in all extremities. No extinction  Cerebellum: FTN/HKS intact. No dysmetria or dysdiadochokinesia.  Gait: Deferred due to recent fall, walks with a rolling walker at baseline    Medication changes on discharge: Aspirin 81 mg daily and clopidogrel 75 mg daily for total of 21 days (last date 6/19), then aspirin 81 mg daily only, atorvastatin 10 mg nightly, miralax and senna prn for constipation  Labs to be followed up on after discharge: None  Imaging to be done after discharge: None  Further workup after discharge: f/u with stroke clinic within 2 weeks, f/u w/primary care provider within 2-4 weeks    Stroke attending attestation:  Pt is a 89 yo M with PMHx of dementia, HTN, HLD, who presented with falls, progressive RUE weakness. MRI brain negative for acute ischemia.    Impr: possible TIA vs progression of underlying dementia with cervical radiculopathy  DAPT x 21 days, statin  D/c to subacute rehab. F/u in neurology clinic.

## 2025-05-31 NOTE — DISCHARGE NOTE PROVIDER - CARE PROVIDER_API CALL
Seamus Denny  Neurology  [address], [address]  [place] [address]  Phone: [phone]  Fax: [phone]  Follow Up Time: 2 weeks    Yemi Chavez  Internal Medicine  [address] [place] [address]  Phone: [phone]  Fax: [phone]  Follow Up Time: 2 weeks   Yemi Chavez  Internal Medicine  [facility] [place] [address]  Phone: [phone]  Fax: [phone]  Follow Up Time: 2 weeks    Tea Gonzalez  Neurology  [address] [place] [address]  Phone: [phone]  Fax: [phone]  Follow Up Time: 2 weeks

## 2025-05-31 NOTE — OCCUPATIONAL THERAPY INITIAL EVALUATION ADULT - RANGE OF MOTION EXAMINATION, UPPER EXTREMITY
RUE shoulder ~1/3 AROM, resisting PROM, elbow ~3/4 AROm until pain noted by  wincing, wrist/digtis WFL - assessment impacted by cognition/Left UE Active ROM was WNL (within normal limits)

## 2025-05-31 NOTE — OCCUPATIONAL THERAPY INITIAL EVALUATION ADULT - ADDITIONAL COMMENTS
son is unsure level of assistance required prior however states pt walks with supervision with RW will continue to clarify

## 2025-05-31 NOTE — PHYSICAL THERAPY INITIAL EVALUATION ADULT - GENERAL OBSERVATIONS, REHAB EVAL
13:56-14:30 34 min  pt received at the EOB with OT Doris in NAD, son at the b/s, pt and son agreeable to continue with PT

## 2025-05-31 NOTE — DISCHARGE NOTE PROVIDER - NSDCFUADDAPPT_GEN_ALL_CORE_FT
If no one calls you for an appointment with stroke clinic by 6/5, please call Dr. Gonzalez's office to set up an appointment within 2 weeks.    Please call Dr. Chavez's office to set up an appointment within 2-4 weeks.

## 2025-05-31 NOTE — OCCUPATIONAL THERAPY INITIAL EVALUATION ADULT - GENERAL OBSERVATIONS, REHAB EVAL
Pt received semi ruggiero in bed in NAD, son at bedside, pt with limited orientation but agreeable to OT evaluaiton, left semi ruggiero in ebd PT Marcelle present vitals stable and within parameters throughout

## 2025-05-31 NOTE — PHYSICAL THERAPY INITIAL EVALUATION ADULT - ADDITIONAL COMMENTS
pt lives alone at [facility], as per son at the b/s, pt was able to get up and go to the bathroom on his own, unsure if pt used AD

## 2025-05-31 NOTE — DISCHARGE NOTE PROVIDER - NSDCMRMEDTOKEN_GEN_ALL_CORE_FT
atorvastatin 40 mg oral tablet: 1 tab(s) orally once a day (at bedtime)  donepezil 23 mg oral tablet: 1 tab(s) orally once a day  escitalopram 10 mg oral tablet: 1 tab(s) orally once a day  latanoprost 0.005% ophthalmic solution: 1 drop(s) in each eye once a day (at bedtime) 1 drop in both eyes  lisinopril 10 mg oral tablet: 1 tab(s) orally once a day  memantine 10 mg oral tablet: 1 tab(s) orally 2 times a day  Multiple Vitamins oral tablet: 1 tab(s) orally once a day  Simbrinza 1%- 0.2% ophthalmic suspension: 1 drop(s) in each affected eye 2 times a day one drop in left eye   aspirin 81 mg oral tablet, chewable: 1 tab(s) orally once a day  atorvastatin 10 mg oral tablet: 1 tab(s) orally once a day (at bedtime)  clopidogrel 75 mg oral tablet: 1 tab(s) orally once a day Please take aspirin 81 mg daily and clopidogrel 75 mg daily until 6/19/25 then transition to aspirin 81 mg daily only  donepezil 23 mg oral tablet: 1 tab(s) orally once a day  escitalopram 10 mg oral tablet: 1 tab(s) orally once a day  latanoprost 0.005% ophthalmic solution: 1 drop(s) in each eye once a day (at bedtime) 1 drop in both eyes  lisinopril 10 mg oral tablet: 1 tab(s) orally once a day  memantine 10 mg oral tablet: 1 tab(s) orally 2 times a day  Multiple Vitamins oral tablet: 1 tab(s) orally once a day  polyethylene glycol 3350 oral powder for reconstitution: 17 gram(s) orally 2 times a day as needed for  constipation  senna leaf extract oral tablet: 2 tab(s) orally once a day (at bedtime) as needed for  constipation  Simbrinza 1%- 0.2% ophthalmic suspension: 1 drop(s) in each affected eye 2 times a day one drop in left eye

## 2025-05-31 NOTE — DISCHARGE NOTE PROVIDER - NSDCCPCAREPLAN_GEN_ALL_CORE_FT
PRINCIPAL DISCHARGE DIAGNOSIS  Diagnosis: Facial droop  Assessment and Plan of Treatment: You were admitted for evaluation and management of: stroke like symptoms however they were probably due to low blood pressure  You were evaluated with: CT, MRI, echocardiogram, bloodwork  You were treated with: blood pressure optimization ,   On discharge please do the following:   - take all medications as prescribed  - follow up with a primary care provider  - follow up with general neurology  Please return to the ED if you feel unwell, or if you experience worsening symptoms, or any of the following symptoms but not limited to: chest pain, palpitations, shortness of breath, falls, confusion, seizures, sudden balance problems, double vision, vision problems, facial droop, arm or leg weakness, slurring, bleeding, severe pain      SECONDARY DISCHARGE DIAGNOSES  Diagnosis: Right arm weakness  Assessment and Plan of Treatment:      PRINCIPAL DISCHARGE DIAGNOSIS  Diagnosis: Transient ischemic attack  Assessment and Plan of Treatment: During this hospital admission, you had a transient ischemic attack. During a transient ischemic attack, blood stops flowing to part of your brain because of a blockage in the blood vessel. This can damage areas in the brain that control other parts of the body.  Please take your aspirin and plavix for blood thinning and Atorvastatin for cholesterol medication/blood vessel protection as prescribed to prevent further strokes. Do not skip doses and do not run low on your medication. If you run low on your medication, please contact your doctor.  You will follow up outpatient with the stroke clinic within 2 weeks. Please follow up with your primary care provider within 2-4 weeks.  Doing your regular tasks may be difficult after you've had a stroke, but you can learn new ways to manage your daily activities. In fact, doing daily activities may help you to regain muscle strength. Be patient, give yourself time to adjust, and appreciate the progress you make. For example, when showering or bathing, test the water temperature with a hand or foot that was not affected by the stroke, use grab bars, a shower seat, a hand-held showerhead, etc if needed. It is normal to feel fatigue after a stroke, while some days may be worse than others, you will continue to improve. If you smoke, please refrain from smoking as it increases your risk for another stroke.  Call 911 right away if you have any of the following symptoms of another stroke:  B: Balance: Sudden: Dizziness, loss of balance, or a sense of falling, difficulty with coordinating movement  E: Eyes: Sudden double vision or trouble seeing in one or both eyes  F: Face: Sudden uneven face  A: Arms (Legs): Sudden weakness, tingling, or loss of feeling on one side of your face or body  S: Speech: Sudden trouble talking or slurred speech, sudden difficulty understanding others  T: Time: Please call 911 right away and go to the emergency room  •Sudden, severe headache  •Blackouts or seizures

## 2025-05-31 NOTE — CONSULT NOTE ADULT - SUBJECTIVE AND OBJECTIVE BOX
This is a 89 year old male patient, with hx of hypertension, DL< Dementia, right arm weakness for 6 months, presenting to our hospital for weakness, lethargy and right facial droop, presenting to our hospital for evaluation for possible stroke .           Patient is a 89 yo M w/PMH of HTN, HLD, dementia (AxO1 at baseline) and right arm baseline weakness for last 6 months with increased falls over the same time period who presented to the ED for weakness, lethargy, R facial droop, and worsened R side weakness with NIHSS initially 15 but improving to NIHSS 4 after 500 cc IVF bolus with CTH, CTP, and CTA H&N largely unremarkable with possible L MCA stenosis now admitted for work up and management of possible L MCA territory stroke.      1. Right facial droop and right sided weakness improving concerning for stroke   - Admit to Stroke unit   - CTH:  a) No acute intracranial pathology.  - CT angio head and neck:   a) Stable focal mild atherosclerotic stenosis in the proximal right cervical ICA (40%).  - MRI brain wo contrast:  a) No evidence of acute intracranial pathology. No acute infarct,   intracranial hemorrhage, mass effect or midline shift.  - Echocardio: pending   - Routine EEG:   - MRI C-spine:   - Cont aspirin   - Cont Statin          - LDL :       2. IGNACIO  -u/a:WNL          - CXR: Bilateral opacities   - s/p 500 cc bolus of LR  - c/w NS 60 cc/hr    3. HTN  - HOLDING Home lisinopril 10 mg daily due to AI     4. HLD  - On home atorvastatin 40 mg nightly  - start atorvastatin 80 mg nightly    5.Dementia/Agitation  - c/w Lexapro 10 mg daily  - c/w memantine 10 mg bid    6. Glaucoma  - latanoprost 1 drop at bedtime in both eyes    #Misc:  - DVT Prophylaxis: Heparin SQ  - Diet: Soft and bite sized, DASH  - Dispo: Stroke  This is a 89 year old male patient, with hx of hypertension, DL< Dementia, right arm weakness for 6 months, presenting to our hospital for weakness, lethargy and right facial droop, presenting to our hospital for evaluation for possible stroke .     VITALS:   T(C): 36.2 (05-31-25 @ 12:00), Max: 36.4 (05-30-25 @ 16:24)  HR: 62 (05-31-25 @ 12:00) (58 - 80)  BP: 144/79 (05-31-25 @ 12:00) (139/70 - 171/86)  RR: 18 (05-31-25 @ 12:00) (18 - 18)  SpO2: 100% (05-31-25 @ 12:00) (95% - 100%)    GENERAL:Looks comfortable but with signficant tremor right arm  Forehead: Myelerson sign positive   HEART: Regular rate and rhythm  LUNGS: GBAE   ABDOMEN: Soft, nontender, nondistended, +BS  EXTREMITIES: 2+ peripheral pulses bilaterally      1. Right facial droop and right sided weakness improving concerning for stroke   * pt has a significant right arm tremor . Son stated it happen to him intermittently   - Admit to Stroke unit   - CTH:  a) No acute intracranial pathology.  - CT angio head and neck:   a) Stable focal mild atherosclerotic stenosis in the proximal right cervical ICA (40%).  - MRI brain wo contrast:  a) No evidence of acute intracranial pathology. No acute infarct,   intracranial hemorrhage, mass effect or midline shift.  - Echocardio: pending   - Routine EEG: pending   - MRI C-spine: pending  - Cont aspirin   - Cont Statin          - LDL : 71      2. IGNACIO resolved   -u/a:WNL          - CXR: Bilateral opacities   - s/p 500 cc bolus of LR  - c/w NS 60 cc/hr    3. HTN  - HOLDING Home lisinopril 10 mg daily due to AI     4. HLD  - On home atorvastatin 40 mg nightly  - start atorvastatin 80 mg nightly    5.Dementia/Agitation  - c/w Lexapro 10 mg daily  - c/w memantine 10 mg bid    6. Glaucoma  - latanoprost 1 drop at bedtime in both eyes    #Misc:  - DVT Prophylaxis: Heparin SQ  - Diet: Soft and bite sized, DASH  - Dispo: Stroke

## 2025-05-31 NOTE — PHYSICAL THERAPY INITIAL EVALUATION ADULT - LEVEL OF INDEPENDENCE: GAIT, REHAB EVAL
pt stood at the b/s with PT and OT for ~ 1min x 1, with RW and min A x 2, pt declined amb and OOB at this time, stated he wanted to lay down, unable to continue with IE at this time 2* pt with increased tremors R UE at rest, RN informed who informed Dr Avi Hood MD came to the b/s to assess pt/unable to perform

## 2025-05-31 NOTE — PATIENT PROFILE ADULT - FALL HARM RISK - HARM RISK INTERVENTIONS

## 2025-06-01 LAB
ALBUMIN SERPL ELPH-MCNC: 3.4 G/DL — LOW (ref 3.5–5.2)
ALP SERPL-CCNC: 135 U/L — HIGH (ref 30–115)
ALT FLD-CCNC: 27 U/L — SIGNIFICANT CHANGE UP (ref 0–41)
ANION GAP SERPL CALC-SCNC: 7 MMOL/L — SIGNIFICANT CHANGE UP (ref 7–14)
AST SERPL-CCNC: 47 U/L — HIGH (ref 0–41)
BASOPHILS # BLD AUTO: 0.03 K/UL — SIGNIFICANT CHANGE UP (ref 0–0.2)
BASOPHILS NFR BLD AUTO: 0.4 % — SIGNIFICANT CHANGE UP (ref 0–1)
BILIRUB SERPL-MCNC: 0.5 MG/DL — SIGNIFICANT CHANGE UP (ref 0.2–1.2)
BUN SERPL-MCNC: 15 MG/DL — SIGNIFICANT CHANGE UP (ref 10–20)
CALCIUM SERPL-MCNC: 9.2 MG/DL — SIGNIFICANT CHANGE UP (ref 8.4–10.5)
CHLORIDE SERPL-SCNC: 106 MMOL/L — SIGNIFICANT CHANGE UP (ref 98–110)
CK SERPL-CCNC: 849 U/L — HIGH (ref 0–225)
CO2 SERPL-SCNC: 25 MMOL/L — SIGNIFICANT CHANGE UP (ref 17–32)
CREAT SERPL-MCNC: 0.9 MG/DL — SIGNIFICANT CHANGE UP (ref 0.7–1.5)
EGFR: 81 ML/MIN/1.73M2 — SIGNIFICANT CHANGE UP
EGFR: 81 ML/MIN/1.73M2 — SIGNIFICANT CHANGE UP
EOSINOPHIL # BLD AUTO: 0.18 K/UL — SIGNIFICANT CHANGE UP (ref 0–0.7)
EOSINOPHIL NFR BLD AUTO: 2.2 % — SIGNIFICANT CHANGE UP (ref 0–8)
FOLATE SERPL-MCNC: >20 NG/ML — SIGNIFICANT CHANGE UP
GLUCOSE SERPL-MCNC: 94 MG/DL — SIGNIFICANT CHANGE UP (ref 70–99)
HCT VFR BLD CALC: 38.2 % — LOW (ref 42–52)
HGB BLD-MCNC: 12.4 G/DL — LOW (ref 14–18)
IMM GRANULOCYTES NFR BLD AUTO: 0.6 % — HIGH (ref 0.1–0.3)
LYMPHOCYTES # BLD AUTO: 1.15 K/UL — LOW (ref 1.2–3.4)
LYMPHOCYTES # BLD AUTO: 14.2 % — LOW (ref 20.5–51.1)
MAGNESIUM SERPL-MCNC: 2 MG/DL — SIGNIFICANT CHANGE UP (ref 1.8–2.4)
MCHC RBC-ENTMCNC: 30.1 PG — SIGNIFICANT CHANGE UP (ref 27–31)
MCHC RBC-ENTMCNC: 32.5 G/DL — SIGNIFICANT CHANGE UP (ref 32–37)
MCV RBC AUTO: 92.7 FL — SIGNIFICANT CHANGE UP (ref 80–94)
MONOCYTES # BLD AUTO: 0.75 K/UL — HIGH (ref 0.1–0.6)
MONOCYTES NFR BLD AUTO: 9.3 % — SIGNIFICANT CHANGE UP (ref 1.7–9.3)
NEUTROPHILS # BLD AUTO: 5.92 K/UL — SIGNIFICANT CHANGE UP (ref 1.4–6.5)
NEUTROPHILS NFR BLD AUTO: 73.3 % — SIGNIFICANT CHANGE UP (ref 42.2–75.2)
NRBC BLD AUTO-RTO: 0 /100 WBCS — SIGNIFICANT CHANGE UP (ref 0–0)
PHOSPHATE SERPL-MCNC: 3.4 MG/DL — SIGNIFICANT CHANGE UP (ref 2.1–4.9)
PLATELET # BLD AUTO: 275 K/UL — SIGNIFICANT CHANGE UP (ref 130–400)
PMV BLD: 9.5 FL — SIGNIFICANT CHANGE UP (ref 7.4–10.4)
POTASSIUM SERPL-MCNC: 4.4 MMOL/L — SIGNIFICANT CHANGE UP (ref 3.5–5)
POTASSIUM SERPL-SCNC: 4.4 MMOL/L — SIGNIFICANT CHANGE UP (ref 3.5–5)
PROT SERPL-MCNC: 6.1 G/DL — SIGNIFICANT CHANGE UP (ref 6–8)
RBC # BLD: 4.12 M/UL — LOW (ref 4.7–6.1)
RBC # FLD: 13.2 % — SIGNIFICANT CHANGE UP (ref 11.5–14.5)
SODIUM SERPL-SCNC: 138 MMOL/L — SIGNIFICANT CHANGE UP (ref 135–146)
VIT B12 SERPL-MCNC: 503 PG/ML — SIGNIFICANT CHANGE UP (ref 232–1245)
WBC # BLD: 8.08 K/UL — SIGNIFICANT CHANGE UP (ref 4.8–10.8)
WBC # FLD AUTO: 8.08 K/UL — SIGNIFICANT CHANGE UP (ref 4.8–10.8)

## 2025-06-01 PROCEDURE — 99232 SBSQ HOSP IP/OBS MODERATE 35: CPT

## 2025-06-01 PROCEDURE — 99223 1ST HOSP IP/OBS HIGH 75: CPT

## 2025-06-01 RX ORDER — ASPIRIN 325 MG
81 TABLET ORAL DAILY
Refills: 0 | Status: DISCONTINUED | OUTPATIENT
Start: 2025-06-01 | End: 2025-06-02

## 2025-06-01 RX ORDER — ATORVASTATIN CALCIUM 80 MG/1
40 TABLET, FILM COATED ORAL AT BEDTIME
Refills: 0 | Status: DISCONTINUED | OUTPATIENT
Start: 2025-06-01 | End: 2025-06-03

## 2025-06-01 RX ORDER — MAGNESIUM HYDROXIDE 400 MG/5ML
30 SUSPENSION ORAL DAILY
Refills: 0 | Status: DISCONTINUED | OUTPATIENT
Start: 2025-06-01 | End: 2025-06-03

## 2025-06-01 RX ORDER — POLYETHYLENE GLYCOL 3350 17 G/17G
17 POWDER, FOR SOLUTION ORAL DAILY
Refills: 0 | Status: DISCONTINUED | OUTPATIENT
Start: 2025-06-01 | End: 2025-06-01

## 2025-06-01 RX ORDER — CLOPIDOGREL BISULFATE 75 MG/1
75 TABLET, FILM COATED ORAL DAILY
Refills: 0 | Status: DISCONTINUED | OUTPATIENT
Start: 2025-06-01 | End: 2025-06-02

## 2025-06-01 RX ORDER — ACETAMINOPHEN 500 MG/5ML
650 LIQUID (ML) ORAL ONCE
Refills: 0 | Status: COMPLETED | OUTPATIENT
Start: 2025-06-01 | End: 2025-06-01

## 2025-06-01 RX ADMIN — MEMANTINE HYDROCHLORIDE 10 MILLIGRAM(S): 21 CAPSULE, EXTENDED RELEASE ORAL at 05:24

## 2025-06-01 RX ADMIN — Medication 1 TABLET(S): at 11:07

## 2025-06-01 RX ADMIN — Medication 75 MILLILITER(S): at 08:05

## 2025-06-01 RX ADMIN — Medication 81 MILLIGRAM(S): at 13:47

## 2025-06-01 RX ADMIN — LISINOPRIL 10 MILLIGRAM(S): 5 TABLET ORAL at 05:24

## 2025-06-01 RX ADMIN — MAGNESIUM HYDROXIDE 30 MILLILITER(S): 400 SUSPENSION ORAL at 13:47

## 2025-06-01 RX ADMIN — ATORVASTATIN CALCIUM 40 MILLIGRAM(S): 80 TABLET, FILM COATED ORAL at 21:39

## 2025-06-01 RX ADMIN — Medication 650 MILLIGRAM(S): at 12:06

## 2025-06-01 RX ADMIN — ESCITALOPRAM OXALATE 10 MILLIGRAM(S): 20 TABLET ORAL at 11:07

## 2025-06-01 RX ADMIN — MEMANTINE HYDROCHLORIDE 10 MILLIGRAM(S): 21 CAPSULE, EXTENDED RELEASE ORAL at 17:13

## 2025-06-01 RX ADMIN — LATANOPROST PF 1 DROP(S): 0.05 SOLUTION/ DROPS OPHTHALMIC at 21:38

## 2025-06-01 RX ADMIN — HEPARIN SODIUM 5000 UNIT(S): 1000 INJECTION INTRAVENOUS; SUBCUTANEOUS at 05:19

## 2025-06-01 RX ADMIN — HEPARIN SODIUM 5000 UNIT(S): 1000 INJECTION INTRAVENOUS; SUBCUTANEOUS at 17:14

## 2025-06-01 RX ADMIN — Medication 650 MILLIGRAM(S): at 11:06

## 2025-06-01 RX ADMIN — CLOPIDOGREL BISULFATE 75 MILLIGRAM(S): 75 TABLET, FILM COATED ORAL at 13:47

## 2025-06-01 NOTE — PROGRESS NOTE ADULT - SUBJECTIVE AND OBJECTIVE BOX
Neurology Stroke Progress Note    INTERVAL HPI/OVERNIGHT EVENTS:  Patient seen and examined. No events overnight.       MEDICATIONS  (STANDING):  atorvastatin 80 milliGRAM(s) Oral at bedtime  escitalopram 10 milliGRAM(s) Oral daily  heparin   Injectable 5000 Unit(s) SubCutaneous every 12 hours  latanoprost 0.005% Ophthalmic Solution 1 Drop(s) Both EYES at bedtime  lisinopril 10 milliGRAM(s) Oral daily  memantine 10 milliGRAM(s) Oral two times a day  multivitamin 1 Tablet(s) Oral daily  sodium chloride 0.9%. 1000 milliLiter(s) (75 mL/Hr) IV Continuous <Continuous>    MEDICATIONS  (PRN):    Allergies    No Known Allergies    Intolerances      Vital Signs Last 24 Hrs  T(C): 36.3 (01 Jun 2025 04:00), Max: 36.4 (31 May 2025 20:00)  T(F): 97.4 (01 Jun 2025 04:00), Max: 97.5 (31 May 2025 20:00)  HR: 76 (01 Jun 2025 04:00) (62 - 76)  BP: 142/75 (01 Jun 2025 05:20) (138/74 - 158/65)  BP(mean): 97 (01 Jun 2025 05:20) (90 - 129)  RR: 18 (01 Jun 2025 04:00) (17 - 18)  SpO2: 98% (01 Jun 2025 04:00) (97% - 100%)    Parameters below as of 01 Jun 2025 04:00  Patient On (Oxygen Delivery Method): room air        Physical exam:  General: Appearance is consistent with chronologic age. No abnormal facies.  Cognitive/Language: The patient is oriented to person only at baseline, can't say month, age, year, location. impaired naming, intact repetition and some commands (unable to do cross-midline commands)  Eyes: VFF. EOMI w/o nystagmus or reported double vision. Pinpoint pupils. No ptosis/weakness of eyelid closure.    Face: Facial sensation normal V1 - 3, face symmetrical  Ears/Nose/Throat: Hearing grossly intact b/l. Palate elevates midline. Tongue and uvula midline.   Motor examination: Normal tone. No tremors or involuntary movements.  Strength Exam (MRC scale): RUE 4+/5 rest of extremities 5/5  Reflexes: 2+ R biceps, triceps, and brachioradialis reflexes, 2+ L biceps, triceps, and brachioradialis reflexes, 1+ b/l patellar and Achilles reflexes. Plantar response downgoing b/l.  Sensory examination: Intact to light touch, temperature, and vibration in all extremities. No extinction  Cerebellum: FTN/HKS intact. No dysmetria or dysdiadochokinesia.  Gait: Deferred due to recent fall, walks with a rolling walker at baseline         LABS:                        12.4   8.08  )-----------( 275      ( 01 Jun 2025 06:47 )             38.2     06-01    138  |  106  |  15  ----------------------------<  94  4.4   |  25  |  0.9    Ca    9.2      01 Jun 2025 06:47  Phos  3.4     06-01  Mg     2.0     06-01    TPro  6.1  /  Alb  3.4[L]  /  TBili  0.5  /  DBili  x   /  AST  47[H]  /  ALT  27  /  AlkPhos  135[H]  06-01    PT/INR - ( 31 May 2025 06:03 )   PT: 11.80 sec;   INR: 1.00 ratio         PTT - ( 31 May 2025 06:03 )  PTT:29.9 sec  Urinalysis Basic - ( 01 Jun 2025 06:47 )    Color: x / Appearance: x / SG: x / pH: x  Gluc: 94 mg/dL / Ketone: x  / Bili: x / Urobili: x   Blood: x / Protein: x / Nitrite: x   Leuk Esterase: x / RBC: x / WBC x   Sq Epi: x / Non Sq Epi: x / Bacteria: x        RADIOLOGY & ADDITIONAL TESTS: Reviewed     Neurology Stroke Progress Note  Patient is a 89 yo M w/PMH of HTN, HLD, dementia (AxO1 at baseline) and right arm baseline weakness for last 6 months with increased falls over the same time period who presented to the ED for weakness, lethargy, R facial droop, and favoring the right side this morning. Patient was notably found on the floor last night at 11 pm. As per patient's son, patient has been falling more frequently over the last 6 months and walks with a rolling walker at baseline. His BP was 84/52 and HR was 240 while in the nursing home prior to coming to the ED, with triage /72 and HR 87. His initial NIHSS during the stroke code was 15, with NIHSS improving after being given IVF boluses. His CTH showed no large territory stroke or hemorrhage, CTP showed no perfusion deficit, and CTA H&N showed R proximal ICA mild stenosis and possible L MCA mild-moderate stenosis but otherwise no other stenosis, LVO, or aneurysm.     When I assessed the patient, NIHSS had improved to 4, close to the patient's baseline as per patient's son. He reports that in March, his father came to the hospital with a very similar presentation of severe weakness and aphasia after sustaining a fall with quick resolution of his symptoms like this time.    Patient's son denies any history of stroke or seizure in his father. He also denies any smoking, drinking, or substance use.    On admission - R facial, R arm weakness, on presentaing worsening symptoms   NIHHs- 15;----> 1 hr ----> 4   mRS- 3  oow for TNK    INTERVAL HPI/OVERNIGHT EVENTS:  Patient seen and examined. No events overnight.     ICU Vital Signs Last 24 Hrs  T(C): 36.3 (01 Jun 2025 12:00), Max: 36.4 (31 May 2025 20:00)  T(F): 97.4 (01 Jun 2025 12:00), Max: 97.5 (31 May 2025 20:00)  HR: 94 (01 Jun 2025 12:00) (65 - 94)  BP: 155/79 (01 Jun 2025 12:00) (138/74 - 158/65)  BP(mean): 104 (01 Jun 2025 12:00) (97 - 129)  RR: 18 ( Jun 2025 12:00) (17 - 18)  SpO2: 96% (01 Jun 2025 12:00) (96% - 99%)    O2 Parameters below as of 01 Jun 2025 12:00  Patient On (Oxygen Delivery Method): room air        31 May 2025 07:01  -  01 Jun 2025 07:00  --------------------------------------------------------  IN:  Total IN: 0 mL    OUT:    Voided (mL): 1647 mL  Total OUT: 1647 mL    Total NET: -1647 mL      01 Jun 2025 07:01  -  01 Jun 2025 13:13  --------------------------------------------------------  IN:  Total IN: 0 mL    OUT:    Voided (mL): 350 mL  Total OUT: 350 mL    Total NET: -350 mL              MEDICATIONS  (STANDING):  atorvastatin 80 milliGRAM(s) Oral at bedtime  escitalopram 10 milliGRAM(s) Oral daily  heparin   Injectable 5000 Unit(s) SubCutaneous every 12 hours  latanoprost 0.005% Ophthalmic Solution 1 Drop(s) Both EYES at bedtime  lisinopril 10 milliGRAM(s) Oral daily  memantine 10 milliGRAM(s) Oral two times a day  multivitamin 1 Tablet(s) Oral daily  sodium chloride 0.9%. 1000 milliLiter(s) (75 mL/Hr) IV Continuous <Continuous>    MEDICATIONS  (PRN):    Allergies    No Known Allergies    Intolerances            Physical exam:  General: Appearance is consistent with chronologic age.  Slight R facial , hypophonic , mildly dysarhric, mildly aphasic, No field cut    Strength Exam (MRC scale): Distal UE - 4/5 on R and L 4 +/5 and prox 3/5 B/L , B/L LE- 4+/5 throughout  Sensory examination: Intact to light touch, temperature, and vibration in all extremities. No extinction  Cerebellum: FTN/HKS intact. No dysmetria or dysdiadochokinesia.  Gait: Deferred due to recent fall, walks with a rolling walker at baseline         LABS:                        12.4   8.08  )-----------( 275      ( 01 Jun 2025 06:47 )             38.2     06-01    138  |  106  |  15  ----------------------------<  94  4.4   |  25  |  0.9    Ca    9.2      01 Jun 2025 06:47  Phos  3.4     06-01  Mg     2.0     06-01    TPro  6.1  /  Alb  3.4[L]  /  TBili  0.5  /  DBili  x   /  AST  47[H]  /  ALT  27  /  AlkPhos  135[H]  06-01    PT/INR - ( 31 May 2025 06:03 )   PT: 11.80 sec;   INR: 1.00 ratio    CPK - 2200---> 849   Troponin-18    Stroke Core Measures  LDL- 71  Trig- 81  HGBA1C- 5.4  TSH- 4.62          PTT - ( 31 May 2025 06:03 )  PTT:29.9 sec  Urinalysis Basic - ( 01 Jun 2025 06:47 )    Color: x / Appearance: x / SG: x / pH: x  Gluc: 94 mg/dL / Ketone: x  / Bili: x / Urobili: x   Blood: x / Protein: x / Nitrite: x   Leuk Esterase: x / RBC: x / WBC x   Sq Epi: x / Non Sq Epi: x / Bacteria: x         MR Head No Cont (05.30.25 @ 17:46) >    IMPRESSION:  No evidence of acute intracranial pathology. No acute infarct,   intracranial hemorrhage, mass effect or midline shift.    Mild chronic microvascular type changes.    Atrophy of the anterior temporal lobes.

## 2025-06-01 NOTE — PROGRESS NOTE ADULT - ASSESSMENT
Patient is a 89 yo M w/PMH of HTN, HLD, dementia (AxO1 at baseline) and right arm baseline weakness for last 6 months with increased falls over the same time period who presented to the ED for weakness, lethargy, R facial droop, and worsened R side weakness with NIHSS initially 15 but improving to NIHSS 4 after 500 cc IVF bolus with CTH, CTP, and CTA H&N largely unremarkable with possible L MCA stenosis now admitted for work up and management of possible L MCA territory stroke. MRH confirmed no stroke. Considering hypotension on presentation suspect initial symptoms secondary to hypotension secondary to dehydration because patient had IGNACIO on initial labs vs seizure. Patient now appears at reported baseline.    #weakness lethargy and R facial droop possibly due to initial hypotension possibly secondary to dehydration   - Neurochecks q8 hrs  - SBP goal 100-160  - Fall and Aspiration precautions  - Provide stroke education  - Physical therapy/Occupational therapy/Speech and Swallow/Physiatry eval  - HbA1c 5.4, TSH 4.62 and LDL 71  - rEEG: no seizures or interictal, diffuse slowing   - obtain MRI C-spine noncon for chronic RUE weakness considering patient has RUE hyperreflexia  - dc Aspirin 81mg and Plavix 75mg daily as MRI neg for stroke  - c/w home Atorvastatin 40mg daily    #IGNACIO  - s/p 500 cc bolus of LR  - c/w NS 60 cc/hr    #HTN  - retsart home lisinopril 10 mg daily    #HLD  - On home atorvastatin 40 mg nightly    #Dementia  #Agitation  - c/w Lexapro 10 mg daily  - c/w memantine 10 mg bid    #Glaucoma  - latanoprost 1 drop at bedtime in both eyes    #Misc:  - DVT Prophylaxis: Heparin SQ  - Diet: Soft and bite sized, DASH  - Dispo: DGTF awaiting bed now. Cant go to his NH over the weekend, will likely dc on monday Patient is a 89 yo M w/PMH of HTN, HLD, dementia (AxO1 at baseline) and right arm baseline weakness for last 6 months with increased falls over the same time period who presented to the ED for weakness, lethargy, R facial droop, and worsened R side weakness with NIHSS initially 15 but improving to NIHSS 4 after 500 cc IVF bolus with CTH, CTP, and CTA H&N largely unremarkable with possible L MCA stenosis now admitted for work up and management of possible L MCA territory stroke. MRH confirmed no stroke. Considering hypotension on presentation suspect initial symptoms secondary to hypotension secondary to dehydration because patient had IGNACIO on initial labs vs seizure. Patient now appears at reported baseline.    TIA    - On review  of CTA L M2 - stenosis -?  ICAD therefore start ASA 81 mg / Plavix 75mg q day  - F/U  Stroke Neurology as OPT    - Neurochecks q8 hrs  - SBP goal 100-<150  - Fall and Aspiration precautions  - Provide stroke education  - Physical therapy/Occupational therapy/Speech and Swallow/Physiatry eval  - Stroke Core Measures as above   - rEEG: no seizures or interictal, diffuse slowing   -  MRI C-spine - report pending   - c/w home Atorvastatin 40mg daily- mild elevation in CPK will F/U CPK after hydration while hospitalization    # renal- IGNACIO  - Stable Cr 1.8---> 0.9   - s/p 500 cc bolus of LR  - NO I/O reorted for 24 hrs  - Continue IVF   - c/w NS 60 cc/hr    #CV- HTN  - Goal - >< 150  - lisinopril 10 mg daily    #End- HLD  - atorvastatin 40 mg nightly    # GI   - Protonix 20mg  q12--- > switch to SAPT in 3 mo's   - Stool softeners   -     #Dementia  #Agitation  - c/w Lexapro 10 mg daily  - c/w memantine 10 mg bid    #Glaucoma  - latanoprost 1 drop at bedtime in both eyes    #Misc:  - DVT Prophylaxis: Heparin SQ  - Diet: Soft and bite sized, DASH  - Dispo: DGTF awaiting bed now. Cant go to his NH  on monday- if  demonstrating adequate documented po intake

## 2025-06-01 NOTE — PROGRESS NOTE ADULT - SUBJECTIVE AND OBJECTIVE BOX
MICHELLE SALINAS  89y  [facility]-[address]      Patient is a 89y old  Male who presents with a chief complaint of Stroke work up (01 Jun 2025 08:22)      INTERVAL HPI/OVERNIGHT EVENTS:        REVIEW OF SYSTEMS:        FAMILY HISTORY:    T(C): 36.4 (06-01-25 @ 08:00), Max: 36.4 (05-31-25 @ 20:00)  HR: 74 (06-01-25 @ 08:00) (62 - 76)  BP: 153/77 (06-01-25 @ 08:00) (138/74 - 158/65)  RR: 18 (06-01-25 @ 08:00) (17 - 18)  SpO2: 98% (06-01-25 @ 08:00) (97% - 100%)  Wt(kg): --Vital Signs Last 24 Hrs  T(C): 36.4 (01 Jun 2025 08:00), Max: 36.4 (31 May 2025 20:00)  T(F): 97.5 (01 Jun 2025 08:00), Max: 97.5 (31 May 2025 20:00)  HR: 74 (01 Jun 2025 08:00) (62 - 76)  BP: 153/77 (01 Jun 2025 08:00) (138/74 - 158/65)  BP(mean): 110 (01 Jun 2025 08:00) (90 - 129)  RR: 18 (01 Jun 2025 08:00) (17 - 18)  SpO2: 98% (01 Jun 2025 08:00) (97% - 100%)    Parameters below as of 01 Jun 2025 08:00  Patient On (Oxygen Delivery Method): room air        PHYSICAL EXAM:  GENERAL: NAD, well-groomed, well-developed  HEAD:  Atraumatic, Normocephalic  EYES: EOMI, PERRLA, conjunctiva and sclera clear  ENMT: No tonsillar erythema, exudates, or enlargement; Moist mucous membranes, Good dentition, No lesions  NECK: Supple, No JVD, Normal thyroid  NERVOUS SYSTEM:  Alert & Oriented X3, Good concentration; Motor Strength 5/5 B/L upper and lower extremities; DTRs 2+ intact and symmetric  PULM: Clear to auscultation bilaterally  CARDIAC: Regular rate and rhythm; No murmurs, rubs, or gallops  GI: Soft, Nontender, Nondistended; Bowel sounds present  EXTREMITIES:  2+ Peripheral Pulses, No clubbing, cyanosis, or edema  LYMPH: No lymphadenopathy noted  SKIN: No rashes or lesions    Consultant(s) Notes Reviewed:  [x ] YES  [ ] NO  Care Discussed with Consultants/Other Providers [ x] YES  [ ] NO    LABS:                            12.4   8.08  )-----------( 275      ( 01 Jun 2025 06:47 )             38.2   06-01    138  |  106  |  15  ----------------------------<  94  4.4   |  25  |  0.9    Ca    9.2      01 Jun 2025 06:47  Phos  3.4     06-01  Mg     2.0     06-01    TPro  6.1  /  Alb  3.4[L]  /  TBili  0.5  /  DBili  x   /  AST  47[H]  /  ALT  27  /  AlkPhos  135[H]  06-01            Urinalysis with Rflx Culture (collected 30 May 2025 15:00)      atorvastatin 40 milliGRAM(s) Oral at bedtime  escitalopram 10 milliGRAM(s) Oral daily  heparin   Injectable 5000 Unit(s) SubCutaneous every 12 hours  latanoprost 0.005% Ophthalmic Solution 1 Drop(s) Both EYES at bedtime  lisinopril 10 milliGRAM(s) Oral daily  memantine 10 milliGRAM(s) Oral two times a day  multivitamin 1 Tablet(s) Oral daily  sodium chloride 0.9%. 1000 milliLiter(s) IV Continuous <Continuous>    This is a 89 year old male patient, with hx of hypertension, DL< Dementia, right arm weakness for 6 months, presenting to our hospital for weakness, lethargy and right facial droop, presenting to our hospital for evaluation for possible stroke .         1. Right facial droop and right sided weakness improving concerning for stroke   * pt has a significant right arm tremor . Son stated it happen to him intermittently   - Admit to Stroke unit   - CTH:  a) No acute intracranial pathology.  - CT angio head and neck:   a) Stable focal mild atherosclerotic stenosis in the proximal right cervical ICA (40%).  - MRI brain wo contrast:  a) No evidence of acute intracranial pathology. No acute infarct,   intracranial hemorrhage, mass effect or midline shift.  - Echocardio: WNL   - Routine EEG: Findings consistent with diffuse electrocerebral dysfunction secondary to nonspecific etiology  - MRI C-spine: pending  - Cont aspirin   - Cont Statin          - LDL : 71      2. IGNACIO resolved   -u/a:WNL          - CXR: Bilateral opacities     3. HTN  - Lisinopril was on hold that can be resumed     4. HLD  - On home atorvastatin 40 mg nightly  - start atorvastatin 80 mg nightly (prefer to hold due to elevated CK)      5.Dementia/Agitation  - c/w Lexapro 10 mg daily  - c/w memantine 10 mg bid    6. Glaucoma  - latanoprost 1 drop at bedtime in both eyes    7. Mild Rhabdomyolysis resolving   - Stop IVF. Encourage po hydration     #Misc:  - DVT Prophylaxis: Heparin SQ  - Diet: Soft and bite sized, DASH  - Dispo: Stroke      MICHELLE SALINAS  89y  [facility]      Patient is a 89y old  Male who presents with a chief complaint of Stroke work up (01 Jun 2025 08:22)      INTERVAL HPI/OVERNIGHT EVENTS:    Patient looks comfortable   no right hand tremor today   No overnight events.         FAMILY HISTORY:    T(C): 36.4 (06-01-25 @ 08:00), Max: 36.4 (05-31-25 @ 20:00)  HR: 74 (06-01-25 @ 08:00) (62 - 76)  BP: 153/77 (06-01-25 @ 08:00) (138/74 - 158/65)  RR: 18 (06-01-25 @ 08:00) (17 - 18)  SpO2: 98% (06-01-25 @ 08:00) (97% - 100%)  Wt(kg): --Vital Signs Last 24 Hrs  T(C): 36.4 (01 Jun 2025 08:00), Max: 36.4 (31 May 2025 20:00)  T(F): 97.5 (01 Jun 2025 08:00), Max: 97.5 (31 May 2025 20:00)  HR: 74 (01 Jun 2025 08:00) (62 - 76)  BP: 153/77 (01 Jun 2025 08:00) (138/74 - 158/65)  BP(mean): 110 (01 Jun 2025 08:00) (90 - 129)  RR: 18 (01 Jun 2025 08:00) (17 - 18)  SpO2: 98% (01 Jun 2025 08:00) (97% - 100%)    Parameters below as of 01 Jun 2025 08:00  Patient On (Oxygen Delivery Method): room air        PHYSICAL EXAM:  GENERAL:Looks comfortable , no right hand tremor today   NERVOUS SYSTEM:  Confused   PULM: Clear to auscultation bilaterally  CARDIAC: Regular rate and rhythm;  GI: Soft, Nontender, Nondistended; Bowel sounds present  EXTREMITIES:  2+ Peripheral Pulses,    Consultant(s) Notes Reviewed:  [x ] YES  [ ] NO  Care Discussed with Consultants/Other Providers [ x] YES  [ ] NO    LABS:                            12.4   8.08  )-----------( 275      ( 01 Jun 2025 06:47 )             38.2   06-01    138  |  106  |  15  ----------------------------<  94  4.4   |  25  |  0.9    Ca    9.2      01 Jun 2025 06:47  Phos  3.4     06-01  Mg     2.0     06-01    TPro  6.1  /  Alb  3.4[L]  /  TBili  0.5  /  DBili  x   /  AST  47[H]  /  ALT  27  /  AlkPhos  135[H]  06-01            Urinalysis with Rflx Culture (collected 30 May 2025 15:00)      atorvastatin 40 milliGRAM(s) Oral at bedtime  escitalopram 10 milliGRAM(s) Oral daily  heparin   Injectable 5000 Unit(s) SubCutaneous every 12 hours  latanoprost 0.005% Ophthalmic Solution 1 Drop(s) Both EYES at bedtime  lisinopril 10 milliGRAM(s) Oral daily  memantine 10 milliGRAM(s) Oral two times a day  multivitamin 1 Tablet(s) Oral daily  sodium chloride 0.9%. 1000 milliLiter(s) IV Continuous <Continuous>    This is a 89 year old male patient, with hx of hypertension, DL< Dementia, right arm weakness for 6 months, presenting to our hospital for weakness, lethargy and right facial droop, presenting to our hospital for evaluation for possible stroke .         1. Right facial droop and right sided weakness improving concerning for stroke   * pt has a significant right arm tremor . Son stated it happen to him intermittently   - Admit to Stroke unit   - CTH:  a) No acute intracranial pathology.  - CT angio head and neck:   a) Stable focal mild atherosclerotic stenosis in the proximal right cervical ICA (40%).  - MRI brain wo contrast:  a) No evidence of acute intracranial pathology. No acute infarct,   intracranial hemorrhage, mass effect or midline shift.  - Echocardio: WNL   - Routine EEG: Findings consistent with diffuse electrocerebral dysfunction secondary to nonspecific etiology  - MRI C-spine: pending (done awaiting reading)   - Cont aspirin   - Cont Statin          - LDL : 71      2. IGNACIO resolved   -u/a:WNL          - CXR: Bilateral opacities     3. HTN  - Lisinopril was on hold that can be resumed     4. HLD  - On home atorvastatin 40 mg nightly  - start atorvastatin 80 mg nightly (prefer to hold due to elevated CK)      5.Dementia/Agitation  - c/w Lexapro 10 mg daily  - c/w memantine 10 mg bid    6. Glaucoma  - latanoprost 1 drop at bedtime in both eyes    7. Mild Rhabdomyolysis resolving   - Stop IVF. Encourage po hydration     #Misc:  - DVT Prophylaxis: Heparin SQ  - Diet: Soft and bite sized, DASH  - Dispo: Stroke

## 2025-06-02 LAB
ALBUMIN SERPL ELPH-MCNC: 3.4 G/DL — LOW (ref 3.5–5.2)
ALP SERPL-CCNC: 141 U/L — HIGH (ref 30–115)
ALT FLD-CCNC: 26 U/L — SIGNIFICANT CHANGE UP (ref 0–41)
ANION GAP SERPL CALC-SCNC: 12 MMOL/L — SIGNIFICANT CHANGE UP (ref 7–14)
AST SERPL-CCNC: 35 U/L — SIGNIFICANT CHANGE UP (ref 0–41)
BASOPHILS # BLD AUTO: 0.04 K/UL — SIGNIFICANT CHANGE UP (ref 0–0.2)
BASOPHILS NFR BLD AUTO: 0.5 % — SIGNIFICANT CHANGE UP (ref 0–1)
BILIRUB SERPL-MCNC: 0.5 MG/DL — SIGNIFICANT CHANGE UP (ref 0.2–1.2)
BUN SERPL-MCNC: 20 MG/DL — SIGNIFICANT CHANGE UP (ref 10–20)
CALCIUM SERPL-MCNC: 9 MG/DL — SIGNIFICANT CHANGE UP (ref 8.4–10.5)
CHLORIDE SERPL-SCNC: 103 MMOL/L — SIGNIFICANT CHANGE UP (ref 98–110)
CK SERPL-CCNC: 330 U/L — HIGH (ref 0–225)
CO2 SERPL-SCNC: 23 MMOL/L — SIGNIFICANT CHANGE UP (ref 17–32)
CREAT SERPL-MCNC: 1 MG/DL — SIGNIFICANT CHANGE UP (ref 0.7–1.5)
EGFR: 71 ML/MIN/1.73M2 — SIGNIFICANT CHANGE UP
EGFR: 71 ML/MIN/1.73M2 — SIGNIFICANT CHANGE UP
EOSINOPHIL # BLD AUTO: 0.22 K/UL — SIGNIFICANT CHANGE UP (ref 0–0.7)
EOSINOPHIL NFR BLD AUTO: 2.7 % — SIGNIFICANT CHANGE UP (ref 0–8)
GLUCOSE SERPL-MCNC: 102 MG/DL — HIGH (ref 70–99)
HCT VFR BLD CALC: 38.8 % — LOW (ref 42–52)
HGB BLD-MCNC: 12.6 G/DL — LOW (ref 14–18)
IMM GRANULOCYTES NFR BLD AUTO: 0.9 % — HIGH (ref 0.1–0.3)
LYMPHOCYTES # BLD AUTO: 1.53 K/UL — SIGNIFICANT CHANGE UP (ref 1.2–3.4)
LYMPHOCYTES # BLD AUTO: 18.8 % — LOW (ref 20.5–51.1)
MAGNESIUM SERPL-MCNC: 2.2 MG/DL — SIGNIFICANT CHANGE UP (ref 1.8–2.4)
MCHC RBC-ENTMCNC: 30.1 PG — SIGNIFICANT CHANGE UP (ref 27–31)
MCHC RBC-ENTMCNC: 32.5 G/DL — SIGNIFICANT CHANGE UP (ref 32–37)
MCV RBC AUTO: 92.8 FL — SIGNIFICANT CHANGE UP (ref 80–94)
MONOCYTES # BLD AUTO: 0.92 K/UL — HIGH (ref 0.1–0.6)
MONOCYTES NFR BLD AUTO: 11.3 % — HIGH (ref 1.7–9.3)
NEUTROPHILS # BLD AUTO: 5.38 K/UL — SIGNIFICANT CHANGE UP (ref 1.4–6.5)
NEUTROPHILS NFR BLD AUTO: 65.8 % — SIGNIFICANT CHANGE UP (ref 42.2–75.2)
NRBC BLD AUTO-RTO: 0 /100 WBCS — SIGNIFICANT CHANGE UP (ref 0–0)
PLATELET # BLD AUTO: 288 K/UL — SIGNIFICANT CHANGE UP (ref 130–400)
PMV BLD: 9.1 FL — SIGNIFICANT CHANGE UP (ref 7.4–10.4)
POTASSIUM SERPL-MCNC: 4.6 MMOL/L — SIGNIFICANT CHANGE UP (ref 3.5–5)
POTASSIUM SERPL-SCNC: 4.6 MMOL/L — SIGNIFICANT CHANGE UP (ref 3.5–5)
PROT SERPL-MCNC: 6.2 G/DL — SIGNIFICANT CHANGE UP (ref 6–8)
RBC # BLD: 4.18 M/UL — LOW (ref 4.7–6.1)
RBC # FLD: 13.4 % — SIGNIFICANT CHANGE UP (ref 11.5–14.5)
SODIUM SERPL-SCNC: 138 MMOL/L — SIGNIFICANT CHANGE UP (ref 135–146)
WBC # BLD: 8.16 K/UL — SIGNIFICANT CHANGE UP (ref 4.8–10.8)
WBC # FLD AUTO: 8.16 K/UL — SIGNIFICANT CHANGE UP (ref 4.8–10.8)

## 2025-06-02 PROCEDURE — 99232 SBSQ HOSP IP/OBS MODERATE 35: CPT

## 2025-06-02 RX ORDER — ACETAMINOPHEN 500 MG/5ML
650 LIQUID (ML) ORAL EVERY 6 HOURS
Refills: 0 | Status: DISCONTINUED | OUTPATIENT
Start: 2025-06-02 | End: 2025-06-03

## 2025-06-02 RX ORDER — CLOPIDOGREL BISULFATE 75 MG/1
75 TABLET, FILM COATED ORAL DAILY
Refills: 0 | Status: DISCONTINUED | OUTPATIENT
Start: 2025-06-02 | End: 2025-06-03

## 2025-06-02 RX ORDER — ASPIRIN 325 MG
81 TABLET ORAL DAILY
Refills: 0 | Status: DISCONTINUED | OUTPATIENT
Start: 2025-06-02 | End: 2025-06-03

## 2025-06-02 RX ADMIN — MEMANTINE HYDROCHLORIDE 10 MILLIGRAM(S): 21 CAPSULE, EXTENDED RELEASE ORAL at 17:00

## 2025-06-02 RX ADMIN — Medication 81 MILLIGRAM(S): at 15:54

## 2025-06-02 RX ADMIN — ATORVASTATIN CALCIUM 40 MILLIGRAM(S): 80 TABLET, FILM COATED ORAL at 21:43

## 2025-06-02 RX ADMIN — HEPARIN SODIUM 5000 UNIT(S): 1000 INJECTION INTRAVENOUS; SUBCUTANEOUS at 17:00

## 2025-06-02 RX ADMIN — Medication 650 MILLIGRAM(S): at 18:07

## 2025-06-02 RX ADMIN — MEMANTINE HYDROCHLORIDE 10 MILLIGRAM(S): 21 CAPSULE, EXTENDED RELEASE ORAL at 06:10

## 2025-06-02 RX ADMIN — CLOPIDOGREL BISULFATE 75 MILLIGRAM(S): 75 TABLET, FILM COATED ORAL at 15:54

## 2025-06-02 RX ADMIN — ESCITALOPRAM OXALATE 10 MILLIGRAM(S): 20 TABLET ORAL at 11:53

## 2025-06-02 RX ADMIN — LISINOPRIL 10 MILLIGRAM(S): 5 TABLET ORAL at 06:10

## 2025-06-02 RX ADMIN — HEPARIN SODIUM 5000 UNIT(S): 1000 INJECTION INTRAVENOUS; SUBCUTANEOUS at 06:10

## 2025-06-02 RX ADMIN — Medication 1 TABLET(S): at 11:53

## 2025-06-02 RX ADMIN — LATANOPROST PF 1 DROP(S): 0.05 SOLUTION/ DROPS OPHTHALMIC at 21:43

## 2025-06-02 RX ADMIN — MAGNESIUM HYDROXIDE 30 MILLILITER(S): 400 SUSPENSION ORAL at 11:53

## 2025-06-02 NOTE — DIETITIAN INITIAL EVALUATION ADULT - ORAL INTAKE PTA/DIET HISTORY
Pt confused/dementia AOx1 at baseline.      Pt confused/dementia AOx1 at baseline. Family not in room. Nutr history limited to EMR at this time. UBW: 68 kg (3/15); CBW 73 kg.   Per RN pt eating well; 50-75% of meals. No s/s of PCM at this time.

## 2025-06-02 NOTE — DIETITIAN INITIAL EVALUATION ADULT - REASON INDICATOR FOR ASSESSMENT
MST score 2 or greater [ ] Pt seen for LOS; [ x] Pt seen for MST consult ("Unsure" of weight loss)  Full Nutrition Initial Assessment not warranted at this time due to:  [ x] No nutrition risk or diagnosis identified .  [ ] Current medical condition precludes nutrition intervention at this time  [ ] Hospice Care and/or Comfort Measures

## 2025-06-02 NOTE — DIETITIAN INITIAL EVALUATION ADULT - OTHER CALCULATIONS
Energy: MSJ 1430 x 1.2 =  1716 kcal/day  Protein:1.0-1.2 gm/kg = 73-88 gm protein /day  Fluid: 1 ml/kcal or per MD recommendations     Estimated energy and protein needs with consideration for weight gain, BMI, age, mobility, nutritional risk, skin integrity, and comorbidities.

## 2025-06-02 NOTE — PROGRESS NOTE ADULT - SUBJECTIVE AND OBJECTIVE BOX
----------Daily Progress Note----------    Neurology Stroke Progress Note    INTERVAL HOSPITAL COURSE / OVERNIGHT EVENTS:      <<<<<PAST MEDICAL & SURGICAL HISTORY>>>>>  Alzheimer disease    Hypertension    Dementia    Heart failure    No significant past surgical history      ALLERGIES  No Known Allergies      Home Medications:  atorvastatin 40 mg oral tablet: 1 tab(s) orally once a day (at bedtime) (30 May 2025 16:30)  donepezil 23 mg oral tablet: 1 tab(s) orally once a day (30 May 2025 16:30)  escitalopram 10 mg oral tablet: 1 tab(s) orally once a day (30 May 2025 16:30)  latanoprost 0.005% ophthalmic solution: 1 drop(s) in each eye once a day (at bedtime) 1 drop in both eyes (30 May 2025 16:30)  lisinopril 10 mg oral tablet: 1 tab(s) orally once a day (30 May 2025 16:30)  memantine 10 mg oral tablet: 1 tab(s) orally 2 times a day (30 May 2025 16:30)  Multiple Vitamins oral tablet: 1 tab(s) orally once a day (30 May 2025 16:30)  Simbrinza 1%- 0.2% ophthalmic suspension: 1 drop(s) in each affected eye 2 times a day one drop in left eye (30 May 2025 16:30)        MEDICATIONS  STANDING MEDICATIONS  aspirin  chewable 81 milliGRAM(s) Oral daily  atorvastatin 40 milliGRAM(s) Oral at bedtime  clopidogrel Tablet 75 milliGRAM(s) Oral daily  escitalopram 10 milliGRAM(s) Oral daily  heparin   Injectable 5000 Unit(s) SubCutaneous every 12 hours  latanoprost 0.005% Ophthalmic Solution 1 Drop(s) Both EYES at bedtime  lisinopril 10 milliGRAM(s) Oral daily  magnesium hydroxide Suspension 30 milliLiter(s) Oral daily  memantine 10 milliGRAM(s) Oral two times a day  multivitamin 1 Tablet(s) Oral daily  pantoprazole    Tablet 20 milliGRAM(s) Oral before breakfast  sodium chloride 0.9%. 1000 milliLiter(s) IV Continuous <Continuous>    PRN MEDICATIONS    VITALS:  T(F): 97.5  HR: 73  BP: 132/76  RR: 18  SpO2: 97%    <<<<<NEURO EXAM>>>>>  General: Appearance is consistent with chronologic age. No abnormal facies.  Cognitive/Language: The patient is oriented to person only at baseline, can't say month but knows age. Language with impaired repetition and intact comprehension and naming but decreased fluency. Mildly dysarthric.    Eyes: VFF. EOMI w/o nystagmus or reported double vision. Pinpoint pupils. No ptosis/weakness of eyelid closure.    Face: Facial sensation normal V1 - 3, slight R facial droop  Ears/Nose/Throat: Hearing grossly intact b/l. Palate elevates midline. Tongue and uvula midline.   Motor examination: Normal tone. No tremors or involuntary movements.  Strength Exam (MRC scale): 5/5 LUE and LLE strength, 4/5 RUE strength except for 3/5 R finger flexion and 1/5 R finger extension, 4+/5 RLE strength  Reflexes: 3+ R biceps, triceps, and brachioradialis reflexes, 2+ L biceps, triceps, and brachioradialis reflexes, 1+ b/l patellar and Achilles reflexes. Plantar response downgoing b/l.  Sensory examination: Intact to light touch, temperature, and vibration in all extremities. No extinction  Cerebellum: FTN/HKS intact. No dysmetria or dysdiadochokinesia.  Gait: Deferred due to recent fall, walks with a rolling walker at baseline    NIHSS: 4 (month 1, R facial droop 1, aphasia 1, dysarthria 1)    <<<<<LABS>>>>>                        12.6   8.16  )-----------( 288      ( 02 Jun 2025 05:56 )             38.8     06-02    138  |  103  |  20  ----------------------------<  102[H]  4.6   |  23  |  1.0    Ca    9.0      02 Jun 2025 05:56  Phos  3.4     06-01  Mg     2.2     06-02    TPro  6.2  /  Alb  3.4[L]  /  TBili  0.5  /  DBili  x   /  AST  35  /  ALT  26  /  AlkPhos  141[H]  06-02      ----------------------------------------------------------------------------------------------------------------------------------------------------------------------------------------------- ----------Daily Progress Note----------    Neurology Stroke Progress Note    INTERVAL HOSPITAL COURSE / OVERNIGHT EVENTS:  No overnight events, reports feeling well but as per son, he appears more lethargic today, remains aphasic but is at baseline    <<<<<PAST MEDICAL & SURGICAL HISTORY>>>>>  Alzheimer disease    Hypertension    Dementia    Heart failure    No significant past surgical history      ALLERGIES  No Known Allergies      Home Medications:  atorvastatin 40 mg oral tablet: 1 tab(s) orally once a day (at bedtime) (30 May 2025 16:30)  donepezil 23 mg oral tablet: 1 tab(s) orally once a day (30 May 2025 16:30)  escitalopram 10 mg oral tablet: 1 tab(s) orally once a day (30 May 2025 16:30)  latanoprost 0.005% ophthalmic solution: 1 drop(s) in each eye once a day (at bedtime) 1 drop in both eyes (30 May 2025 16:30)  lisinopril 10 mg oral tablet: 1 tab(s) orally once a day (30 May 2025 16:30)  memantine 10 mg oral tablet: 1 tab(s) orally 2 times a day (30 May 2025 16:30)  Multiple Vitamins oral tablet: 1 tab(s) orally once a day (30 May 2025 16:30)  Simbrinza 1%- 0.2% ophthalmic suspension: 1 drop(s) in each affected eye 2 times a day one drop in left eye (30 May 2025 16:30)        MEDICATIONS  STANDING MEDICATIONS  aspirin  chewable 81 milliGRAM(s) Oral daily  atorvastatin 40 milliGRAM(s) Oral at bedtime  clopidogrel Tablet 75 milliGRAM(s) Oral daily  escitalopram 10 milliGRAM(s) Oral daily  heparin   Injectable 5000 Unit(s) SubCutaneous every 12 hours  latanoprost 0.005% Ophthalmic Solution 1 Drop(s) Both EYES at bedtime  lisinopril 10 milliGRAM(s) Oral daily  magnesium hydroxide Suspension 30 milliLiter(s) Oral daily  memantine 10 milliGRAM(s) Oral two times a day  multivitamin 1 Tablet(s) Oral daily  pantoprazole    Tablet 20 milliGRAM(s) Oral before breakfast  sodium chloride 0.9%. 1000 milliLiter(s) IV Continuous <Continuous>    PRN MEDICATIONS    VITALS:  T(F): 97.5  HR: 73  BP: 132/76  RR: 18  SpO2: 97%    <<<<<NEURO EXAM>>>>>  General: Appearance is consistent with chronologic age. No abnormal facies.  Cognitive/Language: The patient is oriented to person only at baseline, can't say month but knows age. Language with impaired repetition and intact comprehension and naming but decreased fluency. Mildly dysarthric.    Eyes: VFF. EOMI w/o nystagmus or reported double vision. Pinpoint pupils. No ptosis/weakness of eyelid closure.    Face: Facial sensation normal V1 - 3, slight R facial droop, improved compared to on admission  Ears/Nose/Throat: Hearing grossly intact b/l. Palate elevates midline. Tongue and uvula midline.   Motor examination: Normal tone. No tremors or involuntary movements.  Strength Exam (MRC scale): 5/5 LUE and LLE strength, 4/5 RUE strength except for 3/5 R finger flexion and 1/5 R finger extension, 4+/5 RLE strength  Reflexes: 3+ R biceps, triceps, and brachioradialis reflexes, 2+ L biceps, triceps, and brachioradialis reflexes, 1+ b/l patellar and Achilles reflexes. Plantar response downgoing b/l.  Sensory examination: Intact to light touch, temperature, and vibration in all extremities. No extinction  Cerebellum: FTN/HKS intact. No dysmetria or dysdiadochokinesia.  Gait: Deferred due to recent fall, walks with a rolling walker at baseline    NIHSS: 4 (month 1, R facial droop 1, aphasia 1, dysarthria 1)    <<<<<LABS>>>>>                        12.6   8.16  )-----------( 288      ( 02 Jun 2025 05:56 )             38.8     06-02    138  |  103  |  20  ----------------------------<  102[H]  4.6   |  23  |  1.0    Ca    9.0      02 Jun 2025 05:56  Phos  3.4     06-01  Mg     2.2     06-02    TPro  6.2  /  Alb  3.4[L]  /  TBili  0.5  /  DBili  x   /  AST  35  /  ALT  26  /  AlkPhos  141[H]  06-02      -----------------------------------------------------------------------------------------------------------------------------------------------------------------------------------------------

## 2025-06-02 NOTE — DIETITIAN INITIAL EVALUATION ADULT - PERTINENT LABORATORY DATA
06-02    138  |  103  |  20  ----------------------------<  102[H]  4.6   |  23  |  1.0    Ca    9.0      02 Jun 2025 05:56  Phos  3.4     06-01  Mg     2.2     06-02    TPro  6.2  /  Alb  3.4[L]  /  TBili  0.5  /  DBili  x   /  AST  35  /  ALT  26  /  AlkPhos  141[H]  06-02  A1C with Estimated Average Glucose Result: 5.4 % (05-31-25 @ 06:03)

## 2025-06-02 NOTE — DIETITIAN INITIAL EVALUATION ADULT - PERTINENT MEDS FT
MEDICATIONS  (STANDING):  atorvastatin 40 milliGRAM(s) Oral at bedtime  chlorhexidine 2% Cloths 1 Application(s) Topical <User Schedule>  escitalopram 10 milliGRAM(s) Oral daily  heparin   Injectable 5000 Unit(s) SubCutaneous every 12 hours  latanoprost 0.005% Ophthalmic Solution 1 Drop(s) Both EYES at bedtime  lisinopril 10 milliGRAM(s) Oral daily  magnesium hydroxide Suspension 30 milliLiter(s) Oral daily  memantine 10 milliGRAM(s) Oral two times a day  multivitamin 1 Tablet(s) Oral daily  pantoprazole    Tablet 20 milliGRAM(s) Oral before breakfast  sodium chloride 0.9%. 1000 milliLiter(s) (75 mL/Hr) IV Continuous <Continuous>    MEDICATIONS  (PRN):

## 2025-06-02 NOTE — DIETITIAN INITIAL EVALUATION ADULT - NAME AND PHONE
Sameera Benitez x [phone] or [employer] (preferred)  low risk follow up      Monitoring/Evaluating: PO intake, Labs, Lytes, Wts,  Diet and texture tolerance, NFPF, GI S/S, BM.

## 2025-06-02 NOTE — PROGRESS NOTE ADULT - ASSESSMENT
Patient is a 89 yo M w/PMH of HTN, HLD, dementia (AxO1 at baseline) and right arm baseline weakness for last 6 months with increased falls over the same time period who presented to the ED for weakness, lethargy, R facial droop, and worsened R side weakness with NIHSS initially 15 but improving to NIHSS 4 after 500 cc IVF bolus with CTH, CTP, and CTA H&N largely unremarkable with possible L MCA stenosis now admitted for work up and management of possible L MCA territory stroke.    #Improving aphasia, R facial droop, R-sided weakness  #TIA  - Neurochecks q8h  - SBP goal 100-160  - Fall and Aspiration precautions  - Physical therapy/Occupational therapy/Physiatry rec [facility]  - rEEG neg  - f/u MR C-spine read  - Start Aspirin 81mg and Plavix 75mg daily  - Start Atorvastatin 80mg daily    #IGNACIO  - s/p 500 cc bolus of LR  - c/w NS 60 cc/hr    #HTN  - c/w Home lisinopril 10 mg daily    #HLD  - c/w home atorvastatin 40 mg nightly    #Dementia  #Agitation  - c/w Lexapro 10 mg daily  - c/w memantine 10 mg bid    #Glaucoma  - latanoprost 1 drop at bedtime in both eyes    #Misc:  - DVT Prophylaxis: Heparin SQ  - Diet: Soft and bite sized, DASH  - Dispo: Stroke Unit   Patient is a 89 yo M w/PMH of HTN, HLD, dementia (AxO1 at baseline) and right arm baseline weakness for last 6 months with increased falls over the same time period who presented to the ED for weakness, lethargy, R facial droop, and worsened R side weakness with NIHSS initially 15 but improving to NIHSS 4 after 500 cc IVF bolus with CTH, CTP, and CTA H&N largely unremarkable with possible L MCA stenosis now admitted for work up and management of possible L MCA territory stroke. MR brain showed no acute stroke. He is currently at his baseline as per his son and continues to have RUE weakness likely from cervical radiculopathy. PT/OT/Physiatry recommended [facility]. Patient is now ready for discharge to [facility] pending placement    #Improving aphasia, R facial droop, R-sided weakness  #TIA  - Neurochecks q8h  - SBP goal 100-160  - Fall and Aspiration precautions  - Physical therapy/Occupational therapy/Physiatry rec [facility]  - rEEG neg  - c/w Aspirin 81mg and Plavix 75mg daily for 21 days in total (5/30-6/19)  - c/w Atorvastatin 80mg daily    #IGNACIO  - s/p 500 cc bolus of LR  - c/w NS 60 cc/hr    #HTN  - c/w Home lisinopril 10 mg daily    #HLD  - c/w home atorvastatin 40 mg nightly    #Dementia  #Agitation  - c/w Lexapro 10 mg daily  - c/w memantine 10 mg bid    #Glaucoma  - latanoprost 1 drop at bedtime in both eyes    #Misc:  - DVT Prophylaxis: Heparin SQ  - Diet: Soft and bite sized, DASH  - Dispo: Stroke Unit

## 2025-06-02 NOTE — PROGRESS NOTE ADULT - SUBJECTIVE AND OBJECTIVE BOX
MICHELLE SALINAS  89y  [facility] 3E 005 D      Patient is a 89y old  Male who presents with a chief complaint of Stroke work up (01 Jun 2025 08:22)      INTERVAL HPI/OVERNIGHT EVENTS:    Patient looks comfortable   no right hand tremor today   No overnight events.         FAMILY HISTORY:    T(C): 36.4 (06-01-25 @ 08:00), Max: 36.4 (05-31-25 @ 20:00)  HR: 74 (06-01-25 @ 08:00) (62 - 76)  BP: 153/77 (06-01-25 @ 08:00) (138/74 - 158/65)  RR: 18 (06-01-25 @ 08:00) (17 - 18)  SpO2: 98% (06-01-25 @ 08:00) (97% - 100%)  Wt(kg): --Vital Signs Last 24 Hrs  T(C): 36.4 (01 Jun 2025 08:00), Max: 36.4 (31 May 2025 20:00)  T(F): 97.5 (01 Jun 2025 08:00), Max: 97.5 (31 May 2025 20:00)  HR: 74 (01 Jun 2025 08:00) (62 - 76)  BP: 153/77 (01 Jun 2025 08:00) (138/74 - 158/65)  BP(mean): 110 (01 Jun 2025 08:00) (90 - 129)  RR: 18 (01 Jun 2025 08:00) (17 - 18)  SpO2: 98% (01 Jun 2025 08:00) (97% - 100%)    Parameters below as of 01 Jun 2025 08:00  Patient On (Oxygen Delivery Method): room air        PHYSICAL EXAM:  GENERAL:Looks comfortable , no right hand tremor today   NERVOUS SYSTEM:  Confused   PULM: Clear to auscultation bilaterally  CARDIAC: Regular rate and rhythm;  GI: Soft, Nontender, Nondistended; Bowel sounds present  EXTREMITIES:  2+ Peripheral Pulses,    Consultant(s) Notes Reviewed:  [x ] YES  [ ] NO  Care Discussed with Consultants/Other Providers [ x] YES  [ ] NO    LABS:                            12.4   8.08  )-----------( 275      ( 01 Jun 2025 06:47 )             38.2   06-01    138  |  106  |  15  ----------------------------<  94  4.4   |  25  |  0.9    Ca    9.2      01 Jun 2025 06:47  Phos  3.4     06-01  Mg     2.0     06-01    TPro  6.1  /  Alb  3.4[L]  /  TBili  0.5  /  DBili  x   /  AST  47[H]  /  ALT  27  /  AlkPhos  135[H]  06-01            Urinalysis with Rflx Culture (collected 30 May 2025 15:00)      atorvastatin 40 milliGRAM(s) Oral at bedtime  escitalopram 10 milliGRAM(s) Oral daily  heparin   Injectable 5000 Unit(s) SubCutaneous every 12 hours  latanoprost 0.005% Ophthalmic Solution 1 Drop(s) Both EYES at bedtime  lisinopril 10 milliGRAM(s) Oral daily  memantine 10 milliGRAM(s) Oral two times a day  multivitamin 1 Tablet(s) Oral daily  sodium chloride 0.9%. 1000 milliLiter(s) IV Continuous <Continuous>    This is a 89 year old male patient, with hx of hypertension, DL< Dementia, right arm weakness for 6 months, presenting to our hospital for weakness, lethargy and right facial droop, presenting to our hospital for evaluation for possible stroke .         1. Right facial droop and right sided weakness improving concerning for stroke   * pt has a significant right arm tremor . Son stated it happen to him intermittently   - Admit to Stroke unit   - CTH:  a) No acute intracranial pathology.  - CT angio head and neck:   a) Stable focal mild atherosclerotic stenosis in the proximal right cervical ICA (40%).  - MRI brain wo contrast:  a) No evidence of acute intracranial pathology. No acute infarct,   intracranial hemorrhage, mass effect or midline shift.  - Echocardio: WNL   - Routine EEG: Findings consistent with diffuse electrocerebral dysfunction secondary to nonspecific etiology  - MRI C-spine: pending (done awaiting reading)   - Cont aspirin   - Cont Statin          - LDL : 71      2. IGNACIO resolved   -u/a:WNL          - CXR: Bilateral opacities     3. HTN  - Lisinopril was on hold that can be resumed     4. HLD  - On home atorvastatin 40 mg nightly  - start atorvastatin 40 mg nightly     5.Dementia/Agitation  - c/w Lexapro 10 mg daily  - c/w memantine 10 mg bid    6. Glaucoma  - latanoprost 1 drop at bedtime in both eyes    7. Mild Rhabdomyolysis resolving   - Stop IVF. Encourage po hydration     #Misc:  - DVT Prophylaxis: Heparin SQ  - Diet: Soft and bite sized, DASH  - Dispo: Stroke      BRAD, MICHELLE  89y  [facility] 3[facility] [address]      Patient is a 89y old  Male who presents with a chief complaint of Stroke work up (01 Jun 2025 08:22)      INTERVAL HPI/OVERNIGHT EVENTS:    Patient looks comfortable   He's pleasantly confused.   no overnight events.         FAMILY HISTORY:    T(C): 36.4 (06-01-25 @ 08:00), Max: 36.4 (05-31-25 @ 20:00)  HR: 74 (06-01-25 @ 08:00) (62 - 76)  BP: 153/77 (06-01-25 @ 08:00) (138/74 - 158/65)  RR: 18 (06-01-25 @ 08:00) (17 - 18)  SpO2: 98% (06-01-25 @ 08:00) (97% - 100%)  Wt(kg): --Vital Signs Last 24 Hrs  T(C): 36.4 (01 Jun 2025 08:00), Max: 36.4 (31 May 2025 20:00)  T(F): 97.5 (01 Jun 2025 08:00), Max: 97.5 (31 May 2025 20:00)  HR: 74 (01 Jun 2025 08:00) (62 - 76)  BP: 153/77 (01 Jun 2025 08:00) (138/74 - 158/65)  BP(mean): 110 (01 Jun 2025 08:00) (90 - 129)  RR: 18 (01 Jun 2025 08:00) (17 - 18)  SpO2: 98% (01 Jun 2025 08:00) (97% - 100%)    Parameters below as of 01 Jun 2025 08:00  Patient On (Oxygen Delivery Method): room air        PHYSICAL EXAM:  GENERAL:Confused   NERVOUS SYSTEM:  Confused   PULM: Clear to auscultation bilaterally  CARDIAC: Regular rate and rhythm;  GI: Soft, Nontender, Nondistended; Bowel sounds present  EXTREMITIES:  2+ Peripheral Pulses, no tremors right hand     Consultant(s) Notes Reviewed:  [x ] YES  [ ] NO  Care Discussed with Consultants/Other Providers [ x] YES  [ ] NO    LABS:                            12.4   8.08  )-----------( 275      ( 01 Jun 2025 06:47 )             38.2   06-01    138  |  106  |  15  ----------------------------<  94  4.4   |  25  |  0.9    Ca    9.2      01 Jun 2025 06:47  Phos  3.4     06-01  Mg     2.0     06-01    TPro  6.1  /  Alb  3.4[L]  /  TBili  0.5  /  DBili  x   /  AST  47[H]  /  ALT  27  /  AlkPhos  135[H]  06-01            Urinalysis with Rflx Culture (collected 30 May 2025 15:00)      atorvastatin 40 milliGRAM(s) Oral at bedtime  escitalopram 10 milliGRAM(s) Oral daily  heparin   Injectable 5000 Unit(s) SubCutaneous every 12 hours  latanoprost 0.005% Ophthalmic Solution 1 Drop(s) Both EYES at bedtime  lisinopril 10 milliGRAM(s) Oral daily  memantine 10 milliGRAM(s) Oral two times a day  multivitamin 1 Tablet(s) Oral daily  sodium chloride 0.9%. 1000 milliLiter(s) IV Continuous <Continuous>    This is a 89 year old male patient, with hx of hypertension, DL< Dementia, right arm weakness for 6 months, presenting to our hospital for weakness, lethargy and right facial droop, presenting to our hospital for evaluation for possible stroke .         1. Right facial droop and right sided weakness might be due to TIA resolved + Cervical spine stenosis   * pt has a significant right arm tremor . Son stated it happen to him intermittently   - Admit to Stroke unit   - CTH:  a) No acute intracranial pathology.  - CT angio head and neck:   a) Stable focal mild atherosclerotic stenosis in the proximal right cervical ICA (40%).  - MRI brain wo contrast:  a) No evidence of acute intracranial pathology. No acute infarct,   intracranial hemorrhage, mass effect or midline shift.  - Echocardio: WNL   - Routine EEG: Findings consistent with diffuse electrocerebral dysfunction secondary to nonspecific etiology  - MRI C-spine:   a)  Degenerative changes producing multilevel moderate to severe neural foraminal stenosis most pronounced on the right side at C3-4 and C4-5 and on the left side at C6-7.  - Cont aspirin   - Cont Statin          - LDL : 71      2. IGNACIO resolved   -u/a:WNL          - CXR: Bilateral opacities     3. HTN  - Lisinopril was on hold that can be resumed     4. HLD  - On home atorvastatin 40 mg nightly  - Cont home dose of atrovastatin. CK trending down     5.Dementia/Agitation  - c/w Lexapro 10 mg daily  - c/w memantine 10 mg bid    6. Glaucoma  - latanoprost 1 drop at bedtime in both eyes    7. Mild Rhabdomyolysis resolving   - Stop IVF. Encourage po hydration     #Misc:  - DVT Prophylaxis: Heparin SQ  - Diet: Soft and bite sized, DASH  - Dispo: Stroke

## 2025-06-02 NOTE — CHART NOTE - NSCHARTNOTEFT_GEN_A_CORE
89yo M with PMH of dementia presenting for stroke evaluation. Work-up has been unrevealing. Discussed with Dr. Khanna from neurology; no palliative care needs at this time.     Reconsult as needed.   _____________  Negrito Pires MD  Palliative Medicine  [facility]   [facility] Pall[facility]  [phone]

## 2025-06-03 ENCOUNTER — TRANSCRIPTION ENCOUNTER (OUTPATIENT)
Age: 89
End: 2025-06-03

## 2025-06-03 VITALS
TEMPERATURE: 98 F | HEART RATE: 80 BPM | SYSTOLIC BLOOD PRESSURE: 120 MMHG | OXYGEN SATURATION: 100 % | DIASTOLIC BLOOD PRESSURE: 72 MMHG

## 2025-06-03 PROCEDURE — 99232 SBSQ HOSP IP/OBS MODERATE 35: CPT

## 2025-06-03 PROCEDURE — 99238 HOSP IP/OBS DSCHRG MGMT 30/<: CPT

## 2025-06-03 RX ORDER — CLOPIDOGREL BISULFATE 75 MG/1
1 TABLET, FILM COATED ORAL
Qty: 0 | Refills: 0 | DISCHARGE
Start: 2025-06-03

## 2025-06-03 RX ORDER — SENNA 187 MG
2 TABLET ORAL
Qty: 0 | Refills: 0 | DISCHARGE
Start: 2025-06-03

## 2025-06-03 RX ORDER — SENNA 187 MG
2 TABLET ORAL AT BEDTIME
Refills: 0 | Status: DISCONTINUED | OUTPATIENT
Start: 2025-06-03 | End: 2025-06-03

## 2025-06-03 RX ORDER — POLYETHYLENE GLYCOL 3350 17 G/17G
17 POWDER, FOR SOLUTION ORAL
Qty: 0 | Refills: 0 | DISCHARGE
Start: 2025-06-03

## 2025-06-03 RX ORDER — POLYETHYLENE GLYCOL 3350 17 G/17G
17 POWDER, FOR SOLUTION ORAL
Refills: 0 | Status: DISCONTINUED | OUTPATIENT
Start: 2025-06-03 | End: 2025-06-03

## 2025-06-03 RX ORDER — ASPIRIN 325 MG
1 TABLET ORAL
Qty: 0 | Refills: 0 | DISCHARGE
Start: 2025-06-03

## 2025-06-03 RX ORDER — LACTULOSE 10 G/15ML
10 SOLUTION ORAL ONCE
Refills: 0 | Status: COMPLETED | OUTPATIENT
Start: 2025-06-03 | End: 2025-06-03

## 2025-06-03 RX ORDER — ATORVASTATIN CALCIUM 80 MG/1
1 TABLET, FILM COATED ORAL
Qty: 0 | Refills: 0 | DISCHARGE

## 2025-06-03 RX ADMIN — Medication 650 MILLIGRAM(S): at 06:52

## 2025-06-03 RX ADMIN — HEPARIN SODIUM 5000 UNIT(S): 1000 INJECTION INTRAVENOUS; SUBCUTANEOUS at 05:41

## 2025-06-03 RX ADMIN — LISINOPRIL 10 MILLIGRAM(S): 5 TABLET ORAL at 05:41

## 2025-06-03 RX ADMIN — MAGNESIUM HYDROXIDE 30 MILLILITER(S): 400 SUSPENSION ORAL at 11:52

## 2025-06-03 RX ADMIN — POLYETHYLENE GLYCOL 3350 17 GRAM(S): 17 POWDER, FOR SOLUTION ORAL at 09:40

## 2025-06-03 RX ADMIN — Medication 1 TABLET(S): at 11:52

## 2025-06-03 RX ADMIN — MEMANTINE HYDROCHLORIDE 10 MILLIGRAM(S): 21 CAPSULE, EXTENDED RELEASE ORAL at 05:41

## 2025-06-03 RX ADMIN — ESCITALOPRAM OXALATE 10 MILLIGRAM(S): 20 TABLET ORAL at 11:52

## 2025-06-03 RX ADMIN — Medication 650 MILLIGRAM(S): at 05:41

## 2025-06-03 RX ADMIN — CLOPIDOGREL BISULFATE 75 MILLIGRAM(S): 75 TABLET, FILM COATED ORAL at 11:52

## 2025-06-03 RX ADMIN — LACTULOSE 10 GRAM(S): 10 SOLUTION ORAL at 12:16

## 2025-06-03 RX ADMIN — Medication 81 MILLIGRAM(S): at 11:52

## 2025-06-03 RX ADMIN — Medication 1 APPLICATION(S): at 05:42

## 2025-06-03 NOTE — DISCHARGE NOTE NURSING/CASE MANAGEMENT/SOCIAL WORK - FINANCIAL ASSISTANCE
[facility] provides services at a reduced cost to those who are determined to be eligible through [facility]’s financial assistance program. Information regarding [facility]’s financial assistance program can be found by going to [email or web address] or by calling [phone].

## 2025-06-03 NOTE — PROGRESS NOTE ADULT - REASON FOR ADMISSION
Stroke work up

## 2025-06-03 NOTE — PROGRESS NOTE ADULT - ATTENDING COMMENTS
Pt is a 89 yo M with PMHx of dementia, HTN, HLD, who presented with falls, progressive RUE weakness. MRI brain negative for acute ischemia.    Impr: possible TIA vs progression of underlying dementia with cervical radiculopathy  DAPT x 21 days, statin  PT/OT/ST, Dispo planning- possible subacute rehab
89 yo M w/PMH of HTN, HLD, dementia (AxO1 at baseline) and right arm baseline weakness for last 6 months with increased falls over the same time period who presented to the ED for weakness, lethargy, R facial droop, and worsened R side weakness with NIHSS initially 15 but improving to NIHSS 4 after 500 cc IVF bolus with CTH, CTP, and CTA H&N largely unremarkable with possible L MCA stenosis now admitted for work up and management of possible L MCA territory stroke. MRH confirmed no stroke. Considering hypotension on presentation suspect initial symptoms secondary to hypotension secondary to dehydration because patient had IGNACIO on initial labs vs seizure. Patient now appears at reported baseline.    IGNACIO  nontraumatic rhabdomyolysis  htn  hld  dementia    plan as above    dispo- medical floor
Pt is a 89 yo M with PMHx of dementia, HTN, HLD, who presented with falls, progressive RUE weakness. MRI brain negative for acute ischemia.    Impr: possible TIA vs progression of underlying dementia with cervical radiculopathy  DAPT x 21 days, statin  PT/OT/ST, Dispo planning- subacute rehab.

## 2025-06-03 NOTE — DISCHARGE NOTE NURSING/CASE MANAGEMENT/SOCIAL WORK - PATIENT PORTAL LINK FT
You can access the FollowMyHealth Patient Portal offered by [facility] by registering at the following website: [email or web address]. By joining [employer]’s FollowMyHealth portal, you will also be able to view your health information using other applications (apps) compatible with our system.

## 2025-06-03 NOTE — PROGRESS NOTE ADULT - ASSESSMENT
Patient is a 89 yo M w/PMH of HTN, HLD, dementia (AxO1 at baseline) and right arm baseline weakness for last 6 months with increased falls over the same time period who presented to the ED for weakness, lethargy, R facial droop, and worsened R side weakness with NIHSS initially 15 but improving to NIHSS 4 after 500 cc IVF bolus with CTH, CTP, and CTA H&N largely unremarkable with possible L MCA stenosis now admitted for work up and management of possible L MCA territory stroke. MR brain showed no acute stroke. He is currently at his baseline as per his son and continues to have RUE weakness likely from cervical radiculopathy. PT/OT/Physiatry recommended [facility]. Patient is now ready for discharge to [facility] pending placement    #Improving aphasia, R facial droop, R-sided weakness  #TIA  - Neurochecks q8h  - SBP goal 100-160  - Fall and Aspiration precautions  - Physical therapy/Occupational therapy/Physiatry rec [facility]  - rEEG neg  - c/w Aspirin 81mg and Plavix 75mg daily for 21 days in total (5/30-6/19)  - c/w Atorvastatin 80mg daily    #Constipation  - Start Miralax and Senna    #IGNACIO  - s/p 500 cc bolus of LR  - c/w NS 60 cc/hr    #HTN  - c/w Home lisinopril 10 mg daily    #HLD  - c/w home atorvastatin 40 mg nightly    #Dementia  #Agitation  - c/w Lexapro 10 mg daily  - c/w memantine 10 mg bid    #Glaucoma  - latanoprost 1 drop at bedtime in both eyes    #Misc:  - DVT Prophylaxis: Heparin SQ  - Diet: Soft and bite sized, DASH  - Dispo: Stroke Unit

## 2025-06-03 NOTE — PROGRESS NOTE ADULT - SUBJECTIVE AND OBJECTIVE BOX
BRAD, MICHELLE  89y  [facility] 3[facility] [address]      Patient is a 89y old  Male who presents with a chief complaint of Stroke work up (01 Jun 2025 08:22)      INTERVAL HPI/OVERNIGHT EVENTS:    Patient looks comfortable   He's pleasantly confused.   no overnight events.         FAMILY HISTORY:    T(C): 36.4 (06-01-25 @ 08:00), Max: 36.4 (05-31-25 @ 20:00)  HR: 74 (06-01-25 @ 08:00) (62 - 76)  BP: 153/77 (06-01-25 @ 08:00) (138/74 - 158/65)  RR: 18 (06-01-25 @ 08:00) (17 - 18)  SpO2: 98% (06-01-25 @ 08:00) (97% - 100%)  Wt(kg): --Vital Signs Last 24 Hrs  T(C): 36.4 (01 Jun 2025 08:00), Max: 36.4 (31 May 2025 20:00)  T(F): 97.5 (01 Jun 2025 08:00), Max: 97.5 (31 May 2025 20:00)  HR: 74 (01 Jun 2025 08:00) (62 - 76)  BP: 153/77 (01 Jun 2025 08:00) (138/74 - 158/65)  BP(mean): 110 (01 Jun 2025 08:00) (90 - 129)  RR: 18 (01 Jun 2025 08:00) (17 - 18)  SpO2: 98% (01 Jun 2025 08:00) (97% - 100%)    Parameters below as of 01 Jun 2025 08:00  Patient On (Oxygen Delivery Method): room air        PHYSICAL EXAM:  GENERAL:Confused   NERVOUS SYSTEM:  Confused   PULM: Clear to auscultation bilaterally  CARDIAC: Regular rate and rhythm;  GI: Soft, Nontender, Nondistended; Bowel sounds present  EXTREMITIES:  2+ Peripheral Pulses, no tremors right hand     Consultant(s) Notes Reviewed:  [x ] YES  [ ] NO  Care Discussed with Consultants/Other Providers [ x] YES  [ ] NO    LABS:                            12.4   8.08  )-----------( 275      ( 01 Jun 2025 06:47 )             38.2   06-01    138  |  106  |  15  ----------------------------<  94  4.4   |  25  |  0.9    Ca    9.2      01 Jun 2025 06:47  Phos  3.4     06-01  Mg     2.0     06-01    TPro  6.1  /  Alb  3.4[L]  /  TBili  0.5  /  DBili  x   /  AST  47[H]  /  ALT  27  /  AlkPhos  135[H]  06-01            Urinalysis with Rflx Culture (collected 30 May 2025 15:00)      atorvastatin 40 milliGRAM(s) Oral at bedtime  escitalopram 10 milliGRAM(s) Oral daily  heparin   Injectable 5000 Unit(s) SubCutaneous every 12 hours  latanoprost 0.005% Ophthalmic Solution 1 Drop(s) Both EYES at bedtime  lisinopril 10 milliGRAM(s) Oral daily  memantine 10 milliGRAM(s) Oral two times a day  multivitamin 1 Tablet(s) Oral daily  sodium chloride 0.9%. 1000 milliLiter(s) IV Continuous <Continuous>    This is a 89 year old male patient, with hx of hypertension, DL< Dementia, right arm weakness for 6 months, presenting to our hospital for weakness, lethargy and right facial droop, presenting to our hospital for evaluation for possible stroke .         1. Right facial droop and right sided weakness might be due to TIA resolved + Cervical spine stenosis   * pt has a significant right arm tremor . Son stated it happen to him intermittently   - Admit to Stroke unit   - CTH:  a) No acute intracranial pathology.  - CT angio head and neck:   a) Stable focal mild atherosclerotic stenosis in the proximal right cervical ICA (40%).  - MRI brain wo contrast:  a) No evidence of acute intracranial pathology. No acute infarct,   intracranial hemorrhage, mass effect or midline shift.  - Echocardio: WNL   - Routine EEG: Findings consistent with diffuse electrocerebral dysfunction secondary to nonspecific etiology  - MRI C-spine:   a)  Degenerative changes producing multilevel moderate to severe neural foraminal stenosis most pronounced on the right side at C3-4 and C4-5 and on the left side at C6-7.  - Cont aspirin   - Cont Statin          - LDL : 71      2. IGNACIO resolved   -u/a:WNL          - CXR: Bilateral opacities     3. HTN  - Lisinopril was on hold that was resumed     4. HLD  - On home atorvastatin 40 mg nightly  - Cont home dose of atrovastatin. CK trending down     5.Dementia/Agitation  - c/w Lexapro 10 mg daily  - c/w memantine 10 mg bid    6. Glaucoma  - latanoprost 1 drop at bedtime in both eyes    7. Mild Rhabdomyolysis resolving   - Stop IVF. Encourage po hydration     #Misc:  - DVT Prophylaxis: Heparin SQ  - Diet: Soft and bite sized, DASH  - Dispo: Stroke       Medicine will sign off . Please recall if need it      BRAD, MICHELLE  89y  [facility] 3[facility] [address]      Patient is a 89y old  Male who presents with a chief complaint of Stroke work up (01 Jun 2025 08:22)      INTERVAL HPI/OVERNIGHT EVENTS:    Patient looks comfortable   He's confused. he has no complains   no overnight events.         FAMILY HISTORY:    T(C): 36.4 (06-01-25 @ 08:00), Max: 36.4 (05-31-25 @ 20:00)  HR: 74 (06-01-25 @ 08:00) (62 - 76)  BP: 153/77 (06-01-25 @ 08:00) (138/74 - 158/65)  RR: 18 (06-01-25 @ 08:00) (17 - 18)  SpO2: 98% (06-01-25 @ 08:00) (97% - 100%)  Wt(kg): --Vital Signs Last 24 Hrs  T(C): 36.4 (01 Jun 2025 08:00), Max: 36.4 (31 May 2025 20:00)  T(F): 97.5 (01 Jun 2025 08:00), Max: 97.5 (31 May 2025 20:00)  HR: 74 (01 Jun 2025 08:00) (62 - 76)  BP: 153/77 (01 Jun 2025 08:00) (138/74 - 158/65)  BP(mean): 110 (01 Jun 2025 08:00) (90 - 129)  RR: 18 (01 Jun 2025 08:00) (17 - 18)  SpO2: 98% (01 Jun 2025 08:00) (97% - 100%)    Parameters below as of 01 Jun 2025 08:00  Patient On (Oxygen Delivery Method): room air        PHYSICAL EXAM:  GENERAL:Confused   NERVOUS SYSTEM:  Confused   PULM: Clear to auscultation bilaterally  CARDIAC: Regular rate and rhythm;  GI: Soft, Nontender, distended; Bowel sounds present  EXTREMITIES:  2+ Peripheral Pulses, no tremors right hand     Consultant(s) Notes Reviewed:  [x ] YES  [ ] NO  Care Discussed with Consultants/Other Providers [ x] YES  [ ] NO    LABS:                            12.4   8.08  )-----------( 275      ( 01 Jun 2025 06:47 )             38.2   06-01    138  |  106  |  15  ----------------------------<  94  4.4   |  25  |  0.9    Ca    9.2      01 Jun 2025 06:47  Phos  3.4     06-01  Mg     2.0     06-01    TPro  6.1  /  Alb  3.4[L]  /  TBili  0.5  /  DBili  x   /  AST  47[H]  /  ALT  27  /  AlkPhos  135[H]  06-01            Urinalysis with Rflx Culture (collected 30 May 2025 15:00)      atorvastatin 40 milliGRAM(s) Oral at bedtime  escitalopram 10 milliGRAM(s) Oral daily  heparin   Injectable 5000 Unit(s) SubCutaneous every 12 hours  latanoprost 0.005% Ophthalmic Solution 1 Drop(s) Both EYES at bedtime  lisinopril 10 milliGRAM(s) Oral daily  memantine 10 milliGRAM(s) Oral two times a day  multivitamin 1 Tablet(s) Oral daily  sodium chloride 0.9%. 1000 milliLiter(s) IV Continuous <Continuous>    This is a 89 year old male patient, with hx of hypertension, DL< Dementia, right arm weakness for 6 months, presenting to our hospital for weakness, lethargy and right facial droop, presenting to our hospital for evaluation for possible stroke .         1. Right facial droop and right sided weakness might be due to TIA resolved + Cervical spine stenosis   * pt has a significant right arm tremor . Son stated it happen to him intermittently   - Admit to Stroke unit   - CTH:  a) No acute intracranial pathology.  - CT angio head and neck:   a) Stable focal mild atherosclerotic stenosis in the proximal right cervical ICA (40%).  - MRI brain wo contrast:  a) No evidence of acute intracranial pathology. No acute infarct,   intracranial hemorrhage, mass effect or midline shift.  - Echocardio: WNL   - Routine EEG: Findings consistent with diffuse electrocerebral dysfunction secondary to nonspecific etiology  - MRI C-spine:   a)  Degenerative changes producing multilevel moderate to severe neural foraminal stenosis most pronounced on the right side at C3-4 and C4-5 and on the left side at C6-7.  - Cont aspirin   - Cont Statin          - LDL : 71      2. IGNACIO resolved   -u/a:WNL          - CXR: Bilateral opacities     3. HTN  - Lisinopril was on hold that was resumed     4. HLD  - On home atorvastatin 40 mg nightly  - Cont home dose of atrovastatin. CK trending down     5.Dementia/Agitation  - c/w Lexapro 10 mg daily  - c/w memantine 10 mg bid    6. Glaucoma  - latanoprost 1 drop at bedtime in both eyes    7. Mild Rhabdomyolysis resolving   - Stop IVF. Encourage po hydration     8. Abdominal distention   - Make sure pt is not constipated   - Bladder scan*1     #Misc:  - DVT Prophylaxis: Heparin SQ  - Diet: Soft and bite sized, DASH  - Dispo: Stroke       Medicine will sign off . Please recall if need it

## 2025-06-03 NOTE — PROGRESS NOTE ADULT - SUBJECTIVE AND OBJECTIVE BOX
----------Daily Progress Note----------    Neurology Stroke Progress Note    INTERVAL HOSPITAL COURSE / OVERNIGHT EVENTS:  No overnight events, patient appears more depressed in mood today but otherwise denies any new issues, had not had BM in last 3 days    <<<<<PAST MEDICAL & SURGICAL HISTORY>>>>>  Alzheimer disease    Hypertension    Dementia    Heart failure    No significant past surgical history      ALLERGIES  No Known Allergies      Home Medications:  atorvastatin 40 mg oral tablet: 1 tab(s) orally once a day (at bedtime) (30 May 2025 16:30)  donepezil 23 mg oral tablet: 1 tab(s) orally once a day (30 May 2025 16:30)  escitalopram 10 mg oral tablet: 1 tab(s) orally once a day (30 May 2025 16:30)  latanoprost 0.005% ophthalmic solution: 1 drop(s) in each eye once a day (at bedtime) 1 drop in both eyes (30 May 2025 16:30)  lisinopril 10 mg oral tablet: 1 tab(s) orally once a day (30 May 2025 16:30)  memantine 10 mg oral tablet: 1 tab(s) orally 2 times a day (30 May 2025 16:30)  Multiple Vitamins oral tablet: 1 tab(s) orally once a day (30 May 2025 16:30)  Simbrinza 1%- 0.2% ophthalmic suspension: 1 drop(s) in each affected eye 2 times a day one drop in left eye (30 May 2025 16:30)        MEDICATIONS  STANDING MEDICATIONS  aspirin  chewable 81 milliGRAM(s) Oral daily  atorvastatin 40 milliGRAM(s) Oral at bedtime  chlorhexidine 2% Cloths 1 Application(s) Topical <User Schedule>  clopidogrel Tablet 75 milliGRAM(s) Oral daily  escitalopram 10 milliGRAM(s) Oral daily  heparin   Injectable 5000 Unit(s) SubCutaneous every 12 hours  latanoprost 0.005% Ophthalmic Solution 1 Drop(s) Both EYES at bedtime  lisinopril 10 milliGRAM(s) Oral daily  magnesium hydroxide Suspension 30 milliLiter(s) Oral daily  memantine 10 milliGRAM(s) Oral two times a day  multivitamin 1 Tablet(s) Oral daily  pantoprazole    Tablet 20 milliGRAM(s) Oral before breakfast  sodium chloride 0.9%. 1000 milliLiter(s) IV Continuous <Continuous>    PRN MEDICATIONS  acetaminophen     Tablet .. 650 milliGRAM(s) Oral every 6 hours PRN    VITALS:  T(F): 97.4  HR: 71  BP: 136/80  RR: 18  SpO2: 98%    <<<<<NEURO EXAM>>>>>  General: Appearance is consistent with chronologic age. No abnormal facies.  Cognitive/Language: The patient is oriented to person only at baseline, can't say month but knows age. Language with impaired repetition and intact comprehension and naming but decreased fluency. Mildly dysarthric.    Eyes: VFF. EOMI w/o nystagmus or reported double vision. Pinpoint pupils. No ptosis/weakness of eyelid closure.    Face: Facial sensation normal V1 - 3, slight R facial droop, improved compared to on admission  Ears/Nose/Throat: Hearing grossly intact b/l. Palate elevates midline. Tongue and uvula midline.   Motor examination: Normal tone. No tremors or involuntary movements.  Strength Exam (MRC scale): 5/5 LUE and LLE strength, 4/5 RUE strength except for 3/5 R finger flexion and 1/5 R finger extension, 4+/5 RLE strength  Reflexes: 3+ R biceps, triceps, and brachioradialis reflexes, 2+ L biceps, triceps, and brachioradialis reflexes, 1+ b/l patellar and Achilles reflexes. Plantar response downgoing b/l.  Sensory examination: Intact to light touch, temperature, and vibration in all extremities. No extinction  Cerebellum: FTN/HKS intact. No dysmetria or dysdiadochokinesia.  Gait: Deferred due to recent fall, walks with a rolling walker at baseline    NIHSS: 4 (month 1, R facial droop 1, aphasia 1, dysarthria 1)    <<<<<LABS>>>>>                        12.6   8.16  )-----------( 288      ( 02 Jun 2025 05:56 )             38.8     06-02    138  |  103  |  20  ----------------------------<  102[H]  4.6   |  23  |  1.0    Ca    9.0      02 Jun 2025 05:56  Mg     2.2     06-02    TPro  6.2  /  Alb  3.4[L]  /  TBili  0.5  /  DBili  x   /  AST  35  /  ALT  26  /  AlkPhos  141[H]  06-02    ----------------------------------------------------------------------------------------------------------------------------------------------------------------------------------------------- ----------Daily Progress Note----------    Neurology Stroke Progress Note    INTERVAL HOSPITAL COURSE / OVERNIGHT EVENTS:  No overnight events, patient appears more depressed in mood today but otherwise denies any new issues, had not had BM in last 3 days     <<<<<PAST MEDICAL & SURGICAL HISTORY>>>>>  Alzheimer disease    Hypertension    Dementia    Heart failure    No significant past surgical history      ALLERGIES  No Known Allergies      Home Medications:  atorvastatin 40 mg oral tablet: 1 tab(s) orally once a day (at bedtime) (30 May 2025 16:30)  donepezil 23 mg oral tablet: 1 tab(s) orally once a day (30 May 2025 16:30)  escitalopram 10 mg oral tablet: 1 tab(s) orally once a day (30 May 2025 16:30)  latanoprost 0.005% ophthalmic solution: 1 drop(s) in each eye once a day (at bedtime) 1 drop in both eyes (30 May 2025 16:30)  lisinopril 10 mg oral tablet: 1 tab(s) orally once a day (30 May 2025 16:30)  memantine 10 mg oral tablet: 1 tab(s) orally 2 times a day (30 May 2025 16:30)  Multiple Vitamins oral tablet: 1 tab(s) orally once a day (30 May 2025 16:30)  Simbrinza 1%- 0.2% ophthalmic suspension: 1 drop(s) in each affected eye 2 times a day one drop in left eye (30 May 2025 16:30)        MEDICATIONS  STANDING MEDICATIONS  aspirin  chewable 81 milliGRAM(s) Oral daily  atorvastatin 40 milliGRAM(s) Oral at bedtime  chlorhexidine 2% Cloths 1 Application(s) Topical <User Schedule>  clopidogrel Tablet 75 milliGRAM(s) Oral daily  escitalopram 10 milliGRAM(s) Oral daily  heparin   Injectable 5000 Unit(s) SubCutaneous every 12 hours  latanoprost 0.005% Ophthalmic Solution 1 Drop(s) Both EYES at bedtime  lisinopril 10 milliGRAM(s) Oral daily  magnesium hydroxide Suspension 30 milliLiter(s) Oral daily  memantine 10 milliGRAM(s) Oral two times a day  multivitamin 1 Tablet(s) Oral daily  pantoprazole    Tablet 20 milliGRAM(s) Oral before breakfast  sodium chloride 0.9%. 1000 milliLiter(s) IV Continuous <Continuous>    PRN MEDICATIONS  acetaminophen     Tablet .. 650 milliGRAM(s) Oral every 6 hours PRN    VITALS:  T(F): 97.4  HR: 71  BP: 136/80  RR: 18  SpO2: 98%    <<<<<NEURO EXAM>>>>>  General: Appearance is consistent with chronologic age. No abnormal facies.  Cognitive/Language: The patient is oriented to person only at baseline, can't say month but knows age. Language with impaired repetition and intact comprehension and naming but decreased fluency. Mildly dysarthric.    Eyes: VFF. EOMI w/o nystagmus or reported double vision. Pinpoint pupils. No ptosis/weakness of eyelid closure.    Face: Facial sensation normal V1 - 3, slight R facial droop, improved compared to on admission  Ears/Nose/Throat: Hearing grossly intact b/l. Palate elevates midline. Tongue and uvula midline.   Motor examination: Normal tone. No tremors or involuntary movements.  Strength Exam (MRC scale): 5/5 LUE and LLE strength, 4/5 RUE strength except for 3/5 R finger flexion and 1/5 R finger extension, 4+/5 RLE strength  Reflexes: 3+ R biceps, triceps, and brachioradialis reflexes, 2+ L biceps, triceps, and brachioradialis reflexes, 1+ b/l patellar and Achilles reflexes. Plantar response downgoing b/l.  Sensory examination: Intact to light touch, temperature, and vibration in all extremities. No extinction  Cerebellum: FTN/HKS intact. No dysmetria or dysdiadochokinesia.  Gait: Deferred due to recent fall, walks with a rolling walker at baseline    NIHSS: 4 (month 1, R facial droop 1, aphasia 1, dysarthria 1)    <<<<<LABS>>>>>                        12.6   8.16  )-----------( 288      ( 02 Jun 2025 05:56 )             38.8     06-02    138  |  103  |  20  ----------------------------<  102[H]  4.6   |  23  |  1.0    Ca    9.0      02 Jun 2025 05:56  Mg     2.2     06-02    TPro  6.2  /  Alb  3.4[L]  /  TBili  0.5  /  DBili  x   /  AST  35  /  ALT  26  /  AlkPhos  141[H]  06-02    -----------------------------------------------------------------------------------------------------------------------------------------------------------------------------------------------

## 2025-06-10 DIAGNOSIS — E86.0 DEHYDRATION: ICD-10-CM

## 2025-06-10 DIAGNOSIS — R56.9 UNSPECIFIED CONVULSIONS: ICD-10-CM

## 2025-06-10 DIAGNOSIS — R45.1 RESTLESSNESS AND AGITATION: ICD-10-CM

## 2025-06-10 DIAGNOSIS — G81.91 HEMIPLEGIA, UNSPECIFIED AFFECTING RIGHT DOMINANT SIDE: ICD-10-CM

## 2025-06-10 DIAGNOSIS — G30.9 ALZHEIMER'S DISEASE, UNSPECIFIED: ICD-10-CM

## 2025-06-10 DIAGNOSIS — F02.811 DEMENTIA IN OTHER DISEASES CLASSIFIED ELSEWHERE, UNSPECIFIED SEVERITY, WITH AGITATION: ICD-10-CM

## 2025-06-10 DIAGNOSIS — I69.992 FACIAL WEAKNESS FOLLOWING UNSPECIFIED CEREBROVASCULAR DISEASE: ICD-10-CM

## 2025-06-10 DIAGNOSIS — I95.9 HYPOTENSION, UNSPECIFIED: ICD-10-CM

## 2025-06-10 DIAGNOSIS — F02.80 DEMENTIA IN OTHER DISEASES CLASSIFIED ELSEWHERE, UNSPECIFIED SEVERITY, WITHOUT BEHAVIORAL DISTURBANCE, PSYCHOTIC DISTURBANCE, MOOD DISTURBANCE, AND ANXIETY: ICD-10-CM

## 2025-06-10 DIAGNOSIS — R29.715 NIHSS SCORE 15: ICD-10-CM

## 2025-06-10 DIAGNOSIS — R47.01 APHASIA: ICD-10-CM

## 2025-06-10 DIAGNOSIS — M62.82 RHABDOMYOLYSIS: ICD-10-CM

## 2025-06-10 DIAGNOSIS — I10 ESSENTIAL (PRIMARY) HYPERTENSION: ICD-10-CM

## 2025-06-10 DIAGNOSIS — I66.02 OCCLUSION AND STENOSIS OF LEFT MIDDLE CEREBRAL ARTERY: ICD-10-CM

## 2025-06-10 DIAGNOSIS — M48.02 SPINAL STENOSIS, CERVICAL REGION: ICD-10-CM

## 2025-06-10 DIAGNOSIS — K59.00 CONSTIPATION, UNSPECIFIED: ICD-10-CM

## 2025-06-10 DIAGNOSIS — E78.5 HYPERLIPIDEMIA, UNSPECIFIED: ICD-10-CM

## 2025-06-10 DIAGNOSIS — M54.12 RADICULOPATHY, CERVICAL REGION: ICD-10-CM

## 2025-06-10 DIAGNOSIS — N17.9 ACUTE KIDNEY FAILURE, UNSPECIFIED: ICD-10-CM

## 2025-06-17 ENCOUNTER — APPOINTMENT (OUTPATIENT)
Dept: NEUROLOGY | Facility: CLINIC | Age: 89
End: 2025-06-17

## 2025-08-08 ENCOUNTER — APPOINTMENT (OUTPATIENT)
Dept: NEUROLOGY | Facility: CLINIC | Age: 89
End: 2025-08-08
Payer: MEDICARE

## 2025-08-08 ENCOUNTER — NON-APPOINTMENT (OUTPATIENT)
Age: 89
End: 2025-08-08

## 2025-08-08 VITALS
OXYGEN SATURATION: 98 % | DIASTOLIC BLOOD PRESSURE: 80 MMHG | SYSTOLIC BLOOD PRESSURE: 145 MMHG | HEIGHT: 71 IN | BODY MASS INDEX: 24.08 KG/M2 | HEART RATE: 62 BPM | WEIGHT: 172 LBS

## 2025-08-08 DIAGNOSIS — G45.9 TRANSIENT CEREBRAL ISCHEMIC ATTACK, UNSPECIFIED: ICD-10-CM

## 2025-08-08 DIAGNOSIS — G95.19 OTHER VASCULAR MYELOPATHIES: ICD-10-CM

## 2025-08-08 PROCEDURE — G2211 COMPLEX E/M VISIT ADD ON: CPT

## 2025-08-08 PROCEDURE — 99215 OFFICE O/P EST HI 40 MIN: CPT

## 2025-08-08 RX ORDER — LATANOPROST/PF 0.005 %
0.01 DROPS OPHTHALMIC (EYE) DAILY
Refills: 0 | Status: ACTIVE | COMMUNITY

## 2025-08-08 RX ORDER — ELECTROLYTES/DEXTROSE
SOLUTION, ORAL ORAL
Refills: 0 | Status: ACTIVE | COMMUNITY

## 2025-08-08 RX ORDER — LORATADINE 5 MG
17 TABLET,CHEWABLE ORAL
Refills: 0 | Status: ACTIVE | COMMUNITY

## 2025-08-08 RX ORDER — ESCITALOPRAM OXALATE 10 MG/1
10 TABLET ORAL DAILY
Refills: 0 | Status: ACTIVE | COMMUNITY

## 2025-08-08 RX ORDER — SENNOSIDES 8.6 MG/1
8.6 TABLET ORAL
Refills: 0 | Status: ACTIVE | COMMUNITY

## 2025-08-08 RX ORDER — ASPIRIN 81 MG/1
81 TABLET, COATED ORAL DAILY
Refills: 0 | Status: ACTIVE | COMMUNITY

## 2025-08-08 RX ORDER — DONEPEZIL HYDROCHLORIDE 23 MG/1
23 TABLET, FILM COATED ORAL
Refills: 0 | Status: ACTIVE | COMMUNITY

## 2025-08-08 RX ORDER — MEMANTINE 10 MG/1
10 TABLET ORAL
Refills: 0 | Status: ACTIVE | COMMUNITY

## 2025-08-08 RX ORDER — ATORVASTATIN CALCIUM 10 MG/1
10 TABLET, FILM COATED ORAL DAILY
Refills: 0 | Status: ACTIVE | COMMUNITY

## 2025-08-08 RX ORDER — LISINOPRIL 10 MG/1
10 TABLET ORAL DAILY
Refills: 0 | Status: ACTIVE | COMMUNITY

## 2025-08-08 RX ORDER — BRINZOLAMIDE/BRIMONIDINE TARTRATE 10; 2 MG/ML; MG/ML
1-0.2 SUSPENSION/ DROPS OPHTHALMIC
Refills: 0 | Status: ACTIVE | COMMUNITY

## 2025-08-23 ENCOUNTER — TRANSCRIPTION ENCOUNTER (OUTPATIENT)
Age: 89
End: 2025-08-23

## 2025-09-06 ENCOUNTER — RESULT REVIEW (OUTPATIENT)
Age: 89
End: 2025-09-06

## 2025-09-06 ENCOUNTER — OUTPATIENT (OUTPATIENT)
Dept: OUTPATIENT SERVICES | Facility: HOSPITAL | Age: 89
LOS: 1 days | End: 2025-09-06
Payer: MEDICARE

## 2025-09-06 DIAGNOSIS — G95.19 OTHER VASCULAR MYELOPATHIES: ICD-10-CM

## 2025-09-06 PROCEDURE — 72156 MRI NECK SPINE W/O & W/DYE: CPT

## 2025-09-06 PROCEDURE — 72157 MRI CHEST SPINE W/O & W/DYE: CPT

## 2025-09-06 PROCEDURE — 72156 MRI NECK SPINE W/O & W/DYE: CPT | Mod: 26

## 2025-09-06 PROCEDURE — 72157 MRI CHEST SPINE W/O & W/DYE: CPT | Mod: 26

## 2025-09-06 PROCEDURE — A9579: CPT

## 2025-09-07 DIAGNOSIS — G95.19 OTHER VASCULAR MYELOPATHIES: ICD-10-CM

## 2025-09-08 ENCOUNTER — RESULT REVIEW (OUTPATIENT)
Age: 89
End: 2025-09-08